# Patient Record
Sex: MALE | Race: WHITE | Employment: OTHER | URBAN - METROPOLITAN AREA
[De-identification: names, ages, dates, MRNs, and addresses within clinical notes are randomized per-mention and may not be internally consistent; named-entity substitution may affect disease eponyms.]

---

## 2017-02-07 ENCOUNTER — OFFICE VISIT (OUTPATIENT)
Dept: INTERNAL MEDICINE CLINIC | Age: 67
End: 2017-02-07

## 2017-02-07 VITALS
HEART RATE: 66 BPM | RESPIRATION RATE: 12 BRPM | SYSTOLIC BLOOD PRESSURE: 130 MMHG | HEIGHT: 65 IN | DIASTOLIC BLOOD PRESSURE: 70 MMHG | WEIGHT: 233.2 LBS | BODY MASS INDEX: 38.85 KG/M2 | TEMPERATURE: 97.9 F | OXYGEN SATURATION: 97 %

## 2017-02-07 DIAGNOSIS — E78.5 HYPERLIPIDEMIA, UNSPECIFIED HYPERLIPIDEMIA TYPE: ICD-10-CM

## 2017-02-07 DIAGNOSIS — E11.8 TYPE 2 DIABETES MELLITUS WITH COMPLICATION, WITHOUT LONG-TERM CURRENT USE OF INSULIN (HCC): Primary | ICD-10-CM

## 2017-02-07 DIAGNOSIS — E55.9 VITAMIN D DEFICIENCY: ICD-10-CM

## 2017-02-07 DIAGNOSIS — K21.9 GASTROESOPHAGEAL REFLUX DISEASE, ESOPHAGITIS PRESENCE NOT SPECIFIED: ICD-10-CM

## 2017-02-07 DIAGNOSIS — I10 ESSENTIAL HYPERTENSION: ICD-10-CM

## 2017-02-07 DIAGNOSIS — R80.9 MICROALBUMINURIA: ICD-10-CM

## 2017-02-07 LAB
A/G RATIO: 1.2 (ref 1.1–2.2)
ALBUMIN SERPL-MCNC: 4.2 G/DL (ref 3.4–5)
ALP BLD-CCNC: 63 U/L (ref 40–129)
ALT SERPL-CCNC: 31 U/L (ref 10–40)
ANION GAP SERPL CALCULATED.3IONS-SCNC: 17 MMOL/L (ref 3–16)
AST SERPL-CCNC: 21 U/L (ref 15–37)
BILIRUB SERPL-MCNC: 0.3 MG/DL (ref 0–1)
BUN BLDV-MCNC: 12 MG/DL (ref 7–20)
CALCIUM SERPL-MCNC: 9.4 MG/DL (ref 8.3–10.6)
CHLORIDE BLD-SCNC: 99 MMOL/L (ref 99–110)
CHOLESTEROL, TOTAL: 164 MG/DL (ref 0–199)
CO2: 25 MMOL/L (ref 21–32)
CREAT SERPL-MCNC: 0.7 MG/DL (ref 0.8–1.3)
CREATININE URINE: 72.6 MG/DL (ref 39–259)
GFR AFRICAN AMERICAN: >60
GFR NON-AFRICAN AMERICAN: >60
GLOBULIN: 3.5 G/DL
GLUCOSE BLD-MCNC: 104 MG/DL (ref 70–99)
HDLC SERPL-MCNC: 42 MG/DL (ref 40–60)
LDL CHOLESTEROL CALCULATED: 91 MG/DL
MICROALBUMIN UR-MCNC: 4.7 MG/DL
MICROALBUMIN/CREAT UR-RTO: 64.7 MG/G (ref 0–30)
POTASSIUM SERPL-SCNC: 4.5 MMOL/L (ref 3.5–5.1)
SODIUM BLD-SCNC: 141 MMOL/L (ref 136–145)
TOTAL PROTEIN: 7.7 G/DL (ref 6.4–8.2)
TRIGL SERPL-MCNC: 156 MG/DL (ref 0–150)
VITAMIN D 25-HYDROXY: 46.8 NG/ML
VLDLC SERPL CALC-MCNC: 31 MG/DL

## 2017-02-07 PROCEDURE — 99214 OFFICE O/P EST MOD 30 MIN: CPT | Performed by: INTERNAL MEDICINE

## 2017-02-07 ASSESSMENT — ENCOUNTER SYMPTOMS
CONSTIPATION: 0
COUGH: 0
DIARRHEA: 0
RHINORRHEA: 0
SORE THROAT: 0
SHORTNESS OF BREATH: 0
VOMITING: 0
BLOOD IN STOOL: 0
NAUSEA: 0
ABDOMINAL PAIN: 0
TROUBLE SWALLOWING: 0
WHEEZING: 0
CHEST TIGHTNESS: 0

## 2017-02-08 LAB
ESTIMATED AVERAGE GLUCOSE: 148.5 MG/DL
HBA1C MFR BLD: 6.8 %

## 2017-04-03 DIAGNOSIS — E78.00 PURE HYPERCHOLESTEROLEMIA: ICD-10-CM

## 2017-04-03 DIAGNOSIS — I10 ESSENTIAL HYPERTENSION: ICD-10-CM

## 2017-04-03 RX ORDER — LOVASTATIN 40 MG/1
40 TABLET ORAL NIGHTLY
Qty: 90 TABLET | Refills: 2 | Status: SHIPPED | OUTPATIENT
Start: 2017-04-03 | End: 2018-02-22 | Stop reason: SDUPTHER

## 2017-04-03 RX ORDER — LISINOPRIL 20 MG/1
20 TABLET ORAL DAILY
Qty: 90 TABLET | Refills: 2 | Status: SHIPPED | OUTPATIENT
Start: 2017-04-03 | End: 2018-02-22 | Stop reason: SDUPTHER

## 2017-04-04 LAB
BASOPHILS ABSOLUTE: 0 K/UL (ref 0–0.2)
BASOPHILS RELATIVE PERCENT: 0.5 %
EOSINOPHILS ABSOLUTE: 0 K/UL (ref 0–0.6)
EOSINOPHILS RELATIVE PERCENT: 0.7 %
FERRITIN: 92.4 NG/ML (ref 30–400)
HCT VFR BLD CALC: 39.3 % (ref 40.5–52.5)
HEMOGLOBIN: 12.7 G/DL (ref 13.5–17.5)
IRON SATURATION: 27 % (ref 20–50)
IRON: 93 UG/DL (ref 59–158)
LYMPHOCYTES ABSOLUTE: 2.7 K/UL (ref 1–5.1)
LYMPHOCYTES RELATIVE PERCENT: 39.5 %
MAGNESIUM: 1.7 MG/DL (ref 1.8–2.4)
MCH RBC QN AUTO: 27.8 PG (ref 26–34)
MCHC RBC AUTO-ENTMCNC: 32.3 G/DL (ref 31–36)
MCV RBC AUTO: 86.1 FL (ref 80–100)
MONOCYTES ABSOLUTE: 0.5 K/UL (ref 0–1.3)
MONOCYTES RELATIVE PERCENT: 6.9 %
NEUTROPHILS ABSOLUTE: 3.6 K/UL (ref 1.7–7.7)
NEUTROPHILS RELATIVE PERCENT: 52.4 %
PDW BLD-RTO: 13.7 % (ref 12.4–15.4)
PLATELET # BLD: 266 K/UL (ref 135–450)
PMV BLD AUTO: 9.1 FL (ref 5–10.5)
RBC # BLD: 4.57 M/UL (ref 4.2–5.9)
TOTAL IRON BINDING CAPACITY: 342 UG/DL (ref 260–445)
VITAMIN B-12: 1133 PG/ML (ref 211–911)
WBC # BLD: 6.9 K/UL (ref 4–11)

## 2017-04-20 LAB — FOLATE: >20 NG/ML (ref 4.78–24.2)

## 2017-05-09 ENCOUNTER — OFFICE VISIT (OUTPATIENT)
Dept: INTERNAL MEDICINE CLINIC | Age: 67
End: 2017-05-09

## 2017-05-09 VITALS
DIASTOLIC BLOOD PRESSURE: 70 MMHG | TEMPERATURE: 97.6 F | OXYGEN SATURATION: 98 % | BODY MASS INDEX: 37.09 KG/M2 | HEIGHT: 65 IN | SYSTOLIC BLOOD PRESSURE: 110 MMHG | HEART RATE: 64 BPM | RESPIRATION RATE: 14 BRPM | WEIGHT: 222.6 LBS

## 2017-05-09 DIAGNOSIS — E55.9 VITAMIN D DEFICIENCY: ICD-10-CM

## 2017-05-09 DIAGNOSIS — Z12.11 COLON CANCER SCREENING: ICD-10-CM

## 2017-05-09 DIAGNOSIS — E11.8 TYPE 2 DIABETES MELLITUS WITH COMPLICATION, WITHOUT LONG-TERM CURRENT USE OF INSULIN (HCC): Primary | ICD-10-CM

## 2017-05-09 DIAGNOSIS — R80.9 MICROALBUMINURIA: ICD-10-CM

## 2017-05-09 DIAGNOSIS — K21.9 GASTROESOPHAGEAL REFLUX DISEASE, ESOPHAGITIS PRESENCE NOT SPECIFIED: ICD-10-CM

## 2017-05-09 DIAGNOSIS — I10 ESSENTIAL HYPERTENSION: ICD-10-CM

## 2017-05-09 DIAGNOSIS — E78.5 HYPERLIPIDEMIA, UNSPECIFIED HYPERLIPIDEMIA TYPE: ICD-10-CM

## 2017-05-09 PROCEDURE — 99214 OFFICE O/P EST MOD 30 MIN: CPT | Performed by: INTERNAL MEDICINE

## 2017-05-09 ASSESSMENT — ENCOUNTER SYMPTOMS
VOMITING: 0
CHEST TIGHTNESS: 0
WHEEZING: 0
CONSTIPATION: 0
SHORTNESS OF BREATH: 0
SORE THROAT: 0
NAUSEA: 0
COUGH: 0
ABDOMINAL PAIN: 0
DIARRHEA: 0
RHINORRHEA: 0

## 2017-05-15 ENCOUNTER — NURSE ONLY (OUTPATIENT)
Dept: INTERNAL MEDICINE CLINIC | Age: 67
End: 2017-05-15

## 2017-05-15 DIAGNOSIS — Z12.11 COLON CANCER SCREENING: ICD-10-CM

## 2017-05-15 DIAGNOSIS — Z12.11 COLON CANCER SCREENING: Primary | ICD-10-CM

## 2017-05-15 LAB
CONTROL: NEGATIVE
HEMOCCULT STL QL: NEGATIVE

## 2017-05-15 PROCEDURE — 82274 ASSAY TEST FOR BLOOD FECAL: CPT | Performed by: INTERNAL MEDICINE

## 2017-09-07 ENCOUNTER — OFFICE VISIT (OUTPATIENT)
Dept: INTERNAL MEDICINE CLINIC | Age: 67
End: 2017-09-07

## 2017-09-07 VITALS
BODY MASS INDEX: 36.92 KG/M2 | HEIGHT: 65 IN | DIASTOLIC BLOOD PRESSURE: 64 MMHG | RESPIRATION RATE: 12 BRPM | SYSTOLIC BLOOD PRESSURE: 126 MMHG | TEMPERATURE: 97.6 F | OXYGEN SATURATION: 95 % | WEIGHT: 221.6 LBS | HEART RATE: 63 BPM

## 2017-09-07 DIAGNOSIS — Z00.00 ANNUAL PHYSICAL EXAM: Primary | ICD-10-CM

## 2017-09-07 DIAGNOSIS — D64.9 ANEMIA, UNSPECIFIED TYPE: ICD-10-CM

## 2017-09-07 DIAGNOSIS — E78.5 HYPERLIPIDEMIA, UNSPECIFIED HYPERLIPIDEMIA TYPE: ICD-10-CM

## 2017-09-07 DIAGNOSIS — E11.8 TYPE 2 DIABETES MELLITUS WITH COMPLICATION, WITHOUT LONG-TERM CURRENT USE OF INSULIN (HCC): ICD-10-CM

## 2017-09-07 DIAGNOSIS — Z12.5 SCREENING PSA (PROSTATE SPECIFIC ANTIGEN): ICD-10-CM

## 2017-09-07 DIAGNOSIS — Z23 NEED FOR INFLUENZA VACCINATION: ICD-10-CM

## 2017-09-07 DIAGNOSIS — E55.9 VITAMIN D DEFICIENCY: ICD-10-CM

## 2017-09-07 DIAGNOSIS — Z12.11 COLON CANCER SCREENING: ICD-10-CM

## 2017-09-07 DIAGNOSIS — K21.9 GASTROESOPHAGEAL REFLUX DISEASE, ESOPHAGITIS PRESENCE NOT SPECIFIED: ICD-10-CM

## 2017-09-07 DIAGNOSIS — R80.9 MICROALBUMINURIA: ICD-10-CM

## 2017-09-07 DIAGNOSIS — I10 ESSENTIAL HYPERTENSION: ICD-10-CM

## 2017-09-07 DIAGNOSIS — R31.29 MICROSCOPIC HEMATURIA: ICD-10-CM

## 2017-09-07 LAB
A/G RATIO: 1.4 (ref 1.1–2.2)
ALBUMIN SERPL-MCNC: 4.5 G/DL (ref 3.4–5)
ALP BLD-CCNC: 63 U/L (ref 40–129)
ALT SERPL-CCNC: 30 U/L (ref 10–40)
ANION GAP SERPL CALCULATED.3IONS-SCNC: 17 MMOL/L (ref 3–16)
AST SERPL-CCNC: 24 U/L (ref 15–37)
BASOPHILS ABSOLUTE: 0.1 K/UL (ref 0–0.2)
BASOPHILS RELATIVE PERCENT: 0.6 %
BILIRUB SERPL-MCNC: 0.4 MG/DL (ref 0–1)
BILIRUBIN, POC: ABNORMAL
BLOOD URINE, POC: ABNORMAL
BUN BLDV-MCNC: 13 MG/DL (ref 7–20)
CALCIUM SERPL-MCNC: 10 MG/DL (ref 8.3–10.6)
CHLORIDE BLD-SCNC: 98 MMOL/L (ref 99–110)
CHOLESTEROL, TOTAL: 165 MG/DL (ref 0–199)
CLARITY, POC: CLEAR
CO2: 23 MMOL/L (ref 21–32)
COLOR, POC: YELLOW
CREAT SERPL-MCNC: 0.6 MG/DL (ref 0.8–1.3)
EOSINOPHILS ABSOLUTE: 0.1 K/UL (ref 0–0.6)
EOSINOPHILS RELATIVE PERCENT: 0.9 %
GFR AFRICAN AMERICAN: >60
GFR NON-AFRICAN AMERICAN: >60
GLOBULIN: 3.3 G/DL
GLUCOSE BLD-MCNC: 108 MG/DL (ref 70–99)
GLUCOSE URINE, POC: ABNORMAL
HCT VFR BLD CALC: 39 % (ref 40.5–52.5)
HDLC SERPL-MCNC: 42 MG/DL (ref 40–60)
HEMOCCULT STL QL: NEGATIVE
HEMOCCULT STL QL: NORMAL
HEMOCCULT STL QL: NORMAL
HEMOGLOBIN: 12.8 G/DL (ref 13.5–17.5)
KETONES, POC: ABNORMAL
LDL CHOLESTEROL CALCULATED: 91 MG/DL
LEUKOCYTE EST, POC: ABNORMAL
LYMPHOCYTES ABSOLUTE: 2.7 K/UL (ref 1–5.1)
LYMPHOCYTES RELATIVE PERCENT: 31.7 %
MCH RBC QN AUTO: 27.7 PG (ref 26–34)
MCHC RBC AUTO-ENTMCNC: 32.8 G/DL (ref 31–36)
MCV RBC AUTO: 84.6 FL (ref 80–100)
MONOCYTES ABSOLUTE: 0.6 K/UL (ref 0–1.3)
MONOCYTES RELATIVE PERCENT: 6.8 %
NEUTROPHILS ABSOLUTE: 5.1 K/UL (ref 1.7–7.7)
NEUTROPHILS RELATIVE PERCENT: 60 %
NITRITE, POC: ABNORMAL
PDW BLD-RTO: 14.3 % (ref 12.4–15.4)
PH, POC: 5.5
PLATELET # BLD: 275 K/UL (ref 135–450)
PMV BLD AUTO: 8.8 FL (ref 5–10.5)
POTASSIUM SERPL-SCNC: 4.6 MMOL/L (ref 3.5–5.1)
PROSTATE SPECIFIC ANTIGEN: 1.43 NG/ML (ref 0–4)
PROTEIN, POC: 30
RBC # BLD: 4.61 M/UL (ref 4.2–5.9)
SODIUM BLD-SCNC: 138 MMOL/L (ref 136–145)
SPECIFIC GRAVITY, POC: 1.02
TOTAL PROTEIN: 7.8 G/DL (ref 6.4–8.2)
TRIGL SERPL-MCNC: 162 MG/DL (ref 0–150)
UROBILINOGEN, POC: 0.2
VITAMIN D 25-HYDROXY: 48.2 NG/ML
VLDLC SERPL CALC-MCNC: 32 MG/DL
WBC # BLD: 8.5 K/UL (ref 4–11)

## 2017-09-07 PROCEDURE — 90662 IIV NO PRSV INCREASED AG IM: CPT | Performed by: INTERNAL MEDICINE

## 2017-09-07 PROCEDURE — 81002 URINALYSIS NONAUTO W/O SCOPE: CPT | Performed by: INTERNAL MEDICINE

## 2017-09-07 PROCEDURE — 82270 OCCULT BLOOD FECES: CPT | Performed by: INTERNAL MEDICINE

## 2017-09-07 PROCEDURE — 99397 PER PM REEVAL EST PAT 65+ YR: CPT | Performed by: INTERNAL MEDICINE

## 2017-09-07 PROCEDURE — G0008 ADMIN INFLUENZA VIRUS VAC: HCPCS | Performed by: INTERNAL MEDICINE

## 2017-09-07 RX ORDER — MAGNESIUM OXIDE 400 MG/1
400 TABLET ORAL DAILY
COMMUNITY

## 2017-09-07 ASSESSMENT — ENCOUNTER SYMPTOMS
VOMITING: 0
EYE DISCHARGE: 0
TROUBLE SWALLOWING: 0
APNEA: 0
RHINORRHEA: 0
ANAL BLEEDING: 0
ABDOMINAL DISTENTION: 0
NAUSEA: 0
FACIAL SWELLING: 0
BACK PAIN: 0
EYE PAIN: 0
EYE ITCHING: 0
WHEEZING: 0
PHOTOPHOBIA: 0
CHEST TIGHTNESS: 0
RECTAL PAIN: 0
SORE THROAT: 0
CHOKING: 0
VOICE CHANGE: 0
BLOOD IN STOOL: 0
SHORTNESS OF BREATH: 0
SINUS PRESSURE: 0
DIARRHEA: 0
ABDOMINAL PAIN: 0
EYE REDNESS: 0
COUGH: 0
CONSTIPATION: 0

## 2017-09-08 LAB
ESTIMATED AVERAGE GLUCOSE: 148.5 MG/DL
HBA1C MFR BLD: 6.8 %

## 2017-11-27 LAB — DIABETIC RETINOPATHY: NORMAL

## 2018-02-22 ENCOUNTER — OFFICE VISIT (OUTPATIENT)
Dept: INTERNAL MEDICINE CLINIC | Age: 68
End: 2018-02-22

## 2018-02-22 VITALS
HEIGHT: 65 IN | HEART RATE: 68 BPM | BODY MASS INDEX: 38.72 KG/M2 | SYSTOLIC BLOOD PRESSURE: 124 MMHG | OXYGEN SATURATION: 97 % | DIASTOLIC BLOOD PRESSURE: 72 MMHG | WEIGHT: 232.4 LBS

## 2018-02-22 DIAGNOSIS — E11.9 TYPE 2 DIABETES MELLITUS WITHOUT COMPLICATION, WITHOUT LONG-TERM CURRENT USE OF INSULIN (HCC): Primary | ICD-10-CM

## 2018-02-22 DIAGNOSIS — E11.9 TYPE 2 DIABETES MELLITUS WITHOUT COMPLICATION, WITHOUT LONG-TERM CURRENT USE OF INSULIN (HCC): ICD-10-CM

## 2018-02-22 DIAGNOSIS — E55.9 VITAMIN D DEFICIENCY: ICD-10-CM

## 2018-02-22 DIAGNOSIS — E78.2 MIXED HYPERLIPIDEMIA: ICD-10-CM

## 2018-02-22 DIAGNOSIS — I10 ESSENTIAL HYPERTENSION: ICD-10-CM

## 2018-02-22 DIAGNOSIS — K21.9 GASTROESOPHAGEAL REFLUX DISEASE, ESOPHAGITIS PRESENCE NOT SPECIFIED: ICD-10-CM

## 2018-02-22 DIAGNOSIS — M17.0 PRIMARY OSTEOARTHRITIS OF BOTH KNEES: ICD-10-CM

## 2018-02-22 LAB
A/G RATIO: 1.7 (ref 1.1–2.2)
ALBUMIN SERPL-MCNC: 4.7 G/DL (ref 3.4–5)
ALP BLD-CCNC: 70 U/L (ref 40–129)
ALT SERPL-CCNC: 35 U/L (ref 10–40)
ANION GAP SERPL CALCULATED.3IONS-SCNC: 13 MMOL/L (ref 3–16)
AST SERPL-CCNC: 25 U/L (ref 15–37)
BILIRUB SERPL-MCNC: 0.3 MG/DL (ref 0–1)
BUN BLDV-MCNC: 11 MG/DL (ref 7–20)
CALCIUM SERPL-MCNC: 9.4 MG/DL (ref 8.3–10.6)
CHLORIDE BLD-SCNC: 98 MMOL/L (ref 99–110)
CHOLESTEROL, TOTAL: 205 MG/DL (ref 0–199)
CO2: 26 MMOL/L (ref 21–32)
CREAT SERPL-MCNC: 0.6 MG/DL (ref 0.8–1.3)
CREATININE URINE: 50.3 MG/DL (ref 39–259)
GFR AFRICAN AMERICAN: >60
GFR NON-AFRICAN AMERICAN: >60
GLOBULIN: 2.8 G/DL
GLUCOSE BLD-MCNC: 115 MG/DL (ref 70–99)
HBA1C MFR BLD: 7 %
HDLC SERPL-MCNC: 38 MG/DL (ref 40–60)
LDL CHOLESTEROL CALCULATED: 125 MG/DL
MICROALBUMIN UR-MCNC: 4.5 MG/DL
MICROALBUMIN/CREAT UR-RTO: 89.5 MG/G (ref 0–30)
POTASSIUM SERPL-SCNC: 4.7 MMOL/L (ref 3.5–5.1)
SODIUM BLD-SCNC: 137 MMOL/L (ref 136–145)
TOTAL PROTEIN: 7.5 G/DL (ref 6.4–8.2)
TRIGL SERPL-MCNC: 212 MG/DL (ref 0–150)
VLDLC SERPL CALC-MCNC: 42 MG/DL

## 2018-02-22 PROCEDURE — 83036 HEMOGLOBIN GLYCOSYLATED A1C: CPT | Performed by: INTERNAL MEDICINE

## 2018-02-22 PROCEDURE — 1036F TOBACCO NON-USER: CPT | Performed by: INTERNAL MEDICINE

## 2018-02-22 PROCEDURE — G8484 FLU IMMUNIZE NO ADMIN: HCPCS | Performed by: INTERNAL MEDICINE

## 2018-02-22 PROCEDURE — G8417 CALC BMI ABV UP PARAM F/U: HCPCS | Performed by: INTERNAL MEDICINE

## 2018-02-22 PROCEDURE — G8427 DOCREV CUR MEDS BY ELIG CLIN: HCPCS | Performed by: INTERNAL MEDICINE

## 2018-02-22 PROCEDURE — 1123F ACP DISCUSS/DSCN MKR DOCD: CPT | Performed by: INTERNAL MEDICINE

## 2018-02-22 PROCEDURE — 4040F PNEUMOC VAC/ADMIN/RCVD: CPT | Performed by: INTERNAL MEDICINE

## 2018-02-22 PROCEDURE — 3017F COLORECTAL CA SCREEN DOC REV: CPT | Performed by: INTERNAL MEDICINE

## 2018-02-22 PROCEDURE — 3045F PR MOST RECENT HEMOGLOBIN A1C LEVEL 7.0-9.0%: CPT | Performed by: INTERNAL MEDICINE

## 2018-02-22 PROCEDURE — 99214 OFFICE O/P EST MOD 30 MIN: CPT | Performed by: INTERNAL MEDICINE

## 2018-02-22 RX ORDER — LISINOPRIL 20 MG/1
20 TABLET ORAL DAILY
Qty: 90 TABLET | Refills: 2 | Status: SHIPPED | OUTPATIENT
Start: 2018-02-22 | End: 2018-11-12 | Stop reason: SDUPTHER

## 2018-02-22 RX ORDER — LOVASTATIN 40 MG/1
40 TABLET ORAL NIGHTLY
Qty: 90 TABLET | Refills: 2 | Status: SHIPPED | OUTPATIENT
Start: 2018-02-22 | End: 2018-11-12 | Stop reason: SDUPTHER

## 2018-02-22 ASSESSMENT — PATIENT HEALTH QUESTIONNAIRE - PHQ9
1. LITTLE INTEREST OR PLEASURE IN DOING THINGS: 0
SUM OF ALL RESPONSES TO PHQ9 QUESTIONS 1 & 2: 0
2. FEELING DOWN, DEPRESSED OR HOPELESS: 0
SUM OF ALL RESPONSES TO PHQ QUESTIONS 1-9: 0

## 2018-02-23 ENCOUNTER — TELEPHONE (OUTPATIENT)
Dept: INTERNAL MEDICINE CLINIC | Age: 68
End: 2018-02-23

## 2018-02-23 DIAGNOSIS — K22.70 BARRETT'S ESOPHAGUS WITHOUT DYSPLASIA: Primary | ICD-10-CM

## 2018-02-23 DIAGNOSIS — K21.9 GASTROESOPHAGEAL REFLUX DISEASE, ESOPHAGITIS PRESENCE NOT SPECIFIED: ICD-10-CM

## 2018-02-23 LAB — VITAMIN D 25-HYDROXY: 46.6 NG/ML

## 2018-02-27 ASSESSMENT — ENCOUNTER SYMPTOMS
NAUSEA: 0
CONSTIPATION: 0
ABDOMINAL PAIN: 0
TROUBLE SWALLOWING: 0
SHORTNESS OF BREATH: 0
RHINORRHEA: 0
WHEEZING: 0
SORE THROAT: 0
DIARRHEA: 0
BLOOD IN STOOL: 0
VOMITING: 0
COUGH: 0
CHEST TIGHTNESS: 0

## 2018-03-19 DIAGNOSIS — E11.9 TYPE 2 DIABETES MELLITUS WITHOUT COMPLICATION, WITHOUT LONG-TERM CURRENT USE OF INSULIN (HCC): ICD-10-CM

## 2018-06-22 ENCOUNTER — OFFICE VISIT (OUTPATIENT)
Dept: INTERNAL MEDICINE CLINIC | Age: 68
End: 2018-06-22

## 2018-06-22 VITALS
DIASTOLIC BLOOD PRESSURE: 60 MMHG | OXYGEN SATURATION: 95 % | SYSTOLIC BLOOD PRESSURE: 122 MMHG | HEART RATE: 70 BPM | WEIGHT: 233 LBS | HEIGHT: 65 IN | BODY MASS INDEX: 38.82 KG/M2

## 2018-06-22 DIAGNOSIS — I10 ESSENTIAL HYPERTENSION: ICD-10-CM

## 2018-06-22 DIAGNOSIS — E11.8 TYPE 2 DIABETES MELLITUS WITH COMPLICATION, WITHOUT LONG-TERM CURRENT USE OF INSULIN (HCC): ICD-10-CM

## 2018-06-22 DIAGNOSIS — E78.2 MIXED HYPERLIPIDEMIA: ICD-10-CM

## 2018-06-22 DIAGNOSIS — K21.9 GASTROESOPHAGEAL REFLUX DISEASE, ESOPHAGITIS PRESENCE NOT SPECIFIED: ICD-10-CM

## 2018-06-22 DIAGNOSIS — E11.8 TYPE 2 DIABETES MELLITUS WITH COMPLICATION, WITHOUT LONG-TERM CURRENT USE OF INSULIN (HCC): Primary | ICD-10-CM

## 2018-06-22 DIAGNOSIS — E55.9 VITAMIN D DEFICIENCY: ICD-10-CM

## 2018-06-22 LAB
A/G RATIO: 1.6 (ref 1.1–2.2)
ALBUMIN SERPL-MCNC: 4.6 G/DL (ref 3.4–5)
ALP BLD-CCNC: 61 U/L (ref 40–129)
ALT SERPL-CCNC: 35 U/L (ref 10–40)
ANION GAP SERPL CALCULATED.3IONS-SCNC: 15 MMOL/L (ref 3–16)
AST SERPL-CCNC: 28 U/L (ref 15–37)
BILIRUB SERPL-MCNC: <0.2 MG/DL (ref 0–1)
BUN BLDV-MCNC: 11 MG/DL (ref 7–20)
CALCIUM SERPL-MCNC: 10.1 MG/DL (ref 8.3–10.6)
CHLORIDE BLD-SCNC: 97 MMOL/L (ref 99–110)
CHOLESTEROL, TOTAL: 146 MG/DL (ref 0–199)
CO2: 26 MMOL/L (ref 21–32)
CREAT SERPL-MCNC: 0.6 MG/DL (ref 0.8–1.3)
GFR AFRICAN AMERICAN: >60
GFR NON-AFRICAN AMERICAN: >60
GLOBULIN: 2.8 G/DL
GLUCOSE BLD-MCNC: 113 MG/DL (ref 70–99)
HBA1C MFR BLD: 6.9 %
HDLC SERPL-MCNC: 40 MG/DL (ref 40–60)
LDL CHOLESTEROL CALCULATED: 74 MG/DL
POTASSIUM SERPL-SCNC: 4.9 MMOL/L (ref 3.5–5.1)
SODIUM BLD-SCNC: 138 MMOL/L (ref 136–145)
TOTAL PROTEIN: 7.4 G/DL (ref 6.4–8.2)
TRIGL SERPL-MCNC: 160 MG/DL (ref 0–150)
VLDLC SERPL CALC-MCNC: 32 MG/DL

## 2018-06-22 PROCEDURE — 2022F DILAT RTA XM EVC RTNOPTHY: CPT | Performed by: INTERNAL MEDICINE

## 2018-06-22 PROCEDURE — G8417 CALC BMI ABV UP PARAM F/U: HCPCS | Performed by: INTERNAL MEDICINE

## 2018-06-22 PROCEDURE — 1036F TOBACCO NON-USER: CPT | Performed by: INTERNAL MEDICINE

## 2018-06-22 PROCEDURE — 1123F ACP DISCUSS/DSCN MKR DOCD: CPT | Performed by: INTERNAL MEDICINE

## 2018-06-22 PROCEDURE — 3017F COLORECTAL CA SCREEN DOC REV: CPT | Performed by: INTERNAL MEDICINE

## 2018-06-22 PROCEDURE — 4040F PNEUMOC VAC/ADMIN/RCVD: CPT | Performed by: INTERNAL MEDICINE

## 2018-06-22 PROCEDURE — 83036 HEMOGLOBIN GLYCOSYLATED A1C: CPT | Performed by: INTERNAL MEDICINE

## 2018-06-22 PROCEDURE — 99214 OFFICE O/P EST MOD 30 MIN: CPT | Performed by: INTERNAL MEDICINE

## 2018-06-22 PROCEDURE — 3044F HG A1C LEVEL LT 7.0%: CPT | Performed by: INTERNAL MEDICINE

## 2018-06-22 PROCEDURE — G8427 DOCREV CUR MEDS BY ELIG CLIN: HCPCS | Performed by: INTERNAL MEDICINE

## 2018-06-23 ASSESSMENT — ENCOUNTER SYMPTOMS
RHINORRHEA: 0
NAUSEA: 0
SHORTNESS OF BREATH: 0
DIARRHEA: 0
CONSTIPATION: 0
CHEST TIGHTNESS: 0
WHEEZING: 0
SORE THROAT: 0
BLOOD IN STOOL: 0
ABDOMINAL PAIN: 0
TROUBLE SWALLOWING: 0
COUGH: 0
VOMITING: 0

## 2018-09-18 ENCOUNTER — OFFICE VISIT (OUTPATIENT)
Dept: INTERNAL MEDICINE CLINIC | Age: 68
End: 2018-09-18

## 2018-09-18 VITALS
DIASTOLIC BLOOD PRESSURE: 70 MMHG | BODY MASS INDEX: 37.92 KG/M2 | HEIGHT: 65 IN | SYSTOLIC BLOOD PRESSURE: 132 MMHG | HEART RATE: 70 BPM | RESPIRATION RATE: 14 BRPM | OXYGEN SATURATION: 94 % | TEMPERATURE: 97.9 F | WEIGHT: 227.6 LBS

## 2018-09-18 DIAGNOSIS — R31.29 MICROSCOPIC HEMATURIA: ICD-10-CM

## 2018-09-18 DIAGNOSIS — E11.8 TYPE 2 DIABETES MELLITUS WITH COMPLICATION, WITHOUT LONG-TERM CURRENT USE OF INSULIN (HCC): ICD-10-CM

## 2018-09-18 DIAGNOSIS — R82.998 LEUKOCYTES IN URINE: ICD-10-CM

## 2018-09-18 DIAGNOSIS — K21.9 GASTROESOPHAGEAL REFLUX DISEASE, ESOPHAGITIS PRESENCE NOT SPECIFIED: ICD-10-CM

## 2018-09-18 DIAGNOSIS — Z00.00 ANNUAL PHYSICAL EXAM: ICD-10-CM

## 2018-09-18 DIAGNOSIS — Z12.5 SCREENING PSA (PROSTATE SPECIFIC ANTIGEN): ICD-10-CM

## 2018-09-18 DIAGNOSIS — E78.2 MIXED HYPERLIPIDEMIA: ICD-10-CM

## 2018-09-18 DIAGNOSIS — E55.9 VITAMIN D DEFICIENCY: ICD-10-CM

## 2018-09-18 DIAGNOSIS — Z00.00 ANNUAL PHYSICAL EXAM: Primary | ICD-10-CM

## 2018-09-18 DIAGNOSIS — B35.1 ONYCHOMYCOSIS: ICD-10-CM

## 2018-09-18 DIAGNOSIS — Z13.6 SCREENING FOR ISCHEMIC HEART DISEASE: ICD-10-CM

## 2018-09-18 DIAGNOSIS — Z23 NEED FOR INFLUENZA VACCINATION: ICD-10-CM

## 2018-09-18 DIAGNOSIS — I10 ESSENTIAL HYPERTENSION: ICD-10-CM

## 2018-09-18 DIAGNOSIS — M17.0 PRIMARY OSTEOARTHRITIS OF BOTH KNEES: ICD-10-CM

## 2018-09-18 LAB
A/G RATIO: 1.6 (ref 1.1–2.2)
ALBUMIN SERPL-MCNC: 4.9 G/DL (ref 3.4–5)
ALP BLD-CCNC: 64 U/L (ref 40–129)
ALT SERPL-CCNC: 37 U/L (ref 10–40)
ANION GAP SERPL CALCULATED.3IONS-SCNC: 14 MMOL/L (ref 3–16)
AST SERPL-CCNC: 29 U/L (ref 15–37)
BASOPHILS ABSOLUTE: 0.1 K/UL (ref 0–0.2)
BASOPHILS RELATIVE PERCENT: 0.7 %
BILIRUB SERPL-MCNC: 0.3 MG/DL (ref 0–1)
BILIRUBIN, POC: ABNORMAL
BLOOD URINE, POC: ABNORMAL
BUN BLDV-MCNC: 12 MG/DL (ref 7–20)
CALCIUM SERPL-MCNC: 9.9 MG/DL (ref 8.3–10.6)
CHLORIDE BLD-SCNC: 101 MMOL/L (ref 99–110)
CHOLESTEROL, TOTAL: 142 MG/DL (ref 0–199)
CLARITY, POC: CLEAR
CO2: 26 MMOL/L (ref 21–32)
COLOR, POC: YELLOW
CREAT SERPL-MCNC: 0.6 MG/DL (ref 0.8–1.3)
EOSINOPHILS ABSOLUTE: 0.1 K/UL (ref 0–0.6)
EOSINOPHILS RELATIVE PERCENT: 1.1 %
GFR AFRICAN AMERICAN: >60
GFR NON-AFRICAN AMERICAN: >60
GLOBULIN: 3 G/DL
GLUCOSE BLD-MCNC: 122 MG/DL (ref 70–99)
GLUCOSE URINE, POC: ABNORMAL
HBA1C MFR BLD: 7.1 %
HCT VFR BLD CALC: 37 % (ref 40.5–52.5)
HDLC SERPL-MCNC: 38 MG/DL (ref 40–60)
HEMOGLOBIN: 12.2 G/DL (ref 13.5–17.5)
KETONES, POC: ABNORMAL
LDL CHOLESTEROL CALCULATED: 71 MG/DL
LEUKOCYTE EST, POC: ABNORMAL
LYMPHOCYTES ABSOLUTE: 2.5 K/UL (ref 1–5.1)
LYMPHOCYTES RELATIVE PERCENT: 29.8 %
MCH RBC QN AUTO: 28.2 PG (ref 26–34)
MCHC RBC AUTO-ENTMCNC: 33 G/DL (ref 31–36)
MCV RBC AUTO: 85.6 FL (ref 80–100)
MONOCYTES ABSOLUTE: 0.6 K/UL (ref 0–1.3)
MONOCYTES RELATIVE PERCENT: 6.5 %
NEUTROPHILS ABSOLUTE: 5.3 K/UL (ref 1.7–7.7)
NEUTROPHILS RELATIVE PERCENT: 61.9 %
NITRITE, POC: ABNORMAL
PDW BLD-RTO: 13.8 % (ref 12.4–15.4)
PH, POC: 5.5
PLATELET # BLD: 298 K/UL (ref 135–450)
PMV BLD AUTO: 8.8 FL (ref 5–10.5)
POTASSIUM SERPL-SCNC: 4.6 MMOL/L (ref 3.5–5.1)
PROSTATE SPECIFIC ANTIGEN: 1.12 NG/ML (ref 0–4)
PROTEIN, POC: ABNORMAL
RBC # BLD: 4.33 M/UL (ref 4.2–5.9)
SODIUM BLD-SCNC: 141 MMOL/L (ref 136–145)
SPECIFIC GRAVITY, POC: 1.01
TOTAL PROTEIN: 7.9 G/DL (ref 6.4–8.2)
TRIGL SERPL-MCNC: 166 MG/DL (ref 0–150)
TSH SERPL DL<=0.05 MIU/L-ACNC: 0.94 UIU/ML (ref 0.27–4.2)
UROBILINOGEN, POC: 0.2
VITAMIN D 25-HYDROXY: 53.5 NG/ML
VLDLC SERPL CALC-MCNC: 33 MG/DL
WBC # BLD: 8.5 K/UL (ref 4–11)

## 2018-09-18 PROCEDURE — 83036 HEMOGLOBIN GLYCOSYLATED A1C: CPT | Performed by: INTERNAL MEDICINE

## 2018-09-18 PROCEDURE — 99397 PER PM REEVAL EST PAT 65+ YR: CPT | Performed by: INTERNAL MEDICINE

## 2018-09-18 PROCEDURE — 93000 ELECTROCARDIOGRAM COMPLETE: CPT | Performed by: INTERNAL MEDICINE

## 2018-09-18 PROCEDURE — 90662 IIV NO PRSV INCREASED AG IM: CPT | Performed by: INTERNAL MEDICINE

## 2018-09-18 PROCEDURE — 81002 URINALYSIS NONAUTO W/O SCOPE: CPT | Performed by: INTERNAL MEDICINE

## 2018-09-18 PROCEDURE — G0008 ADMIN INFLUENZA VIRUS VAC: HCPCS | Performed by: INTERNAL MEDICINE

## 2018-09-18 ASSESSMENT — ENCOUNTER SYMPTOMS
EYE ITCHING: 0
APNEA: 0
CHOKING: 0
WHEEZING: 0
EYE PAIN: 0
SHORTNESS OF BREATH: 0
RECTAL PAIN: 0
PHOTOPHOBIA: 0
DIARRHEA: 0
ANAL BLEEDING: 0
EYE REDNESS: 0
SORE THROAT: 0
VOMITING: 0
FACIAL SWELLING: 0
CONSTIPATION: 0
RHINORRHEA: 0
SINUS PRESSURE: 0
NAUSEA: 0
EYE DISCHARGE: 0
BLOOD IN STOOL: 0
ABDOMINAL DISTENTION: 0
VOICE CHANGE: 0
COUGH: 0
ABDOMINAL PAIN: 0
TROUBLE SWALLOWING: 0
BACK PAIN: 0
CHEST TIGHTNESS: 0

## 2018-09-18 NOTE — PATIENT INSTRUCTIONS
in flu shots. They cannot cause the flu. There are many flu viruses, and they are always changing. Each year a new flu vaccine is made to protect against three or four viruses that are likely to cause disease in the upcoming flu season. But even when the vaccine doesn't exactly match these viruses, it may still provide some protection. Flu vaccine cannot prevent:  · Flu that is caused by a virus not covered by the vaccine. · Illnesses that look like flu but are not. Some people should not get this vaccine  Tell the person who is giving you the vaccine:  · If you have any severe (life-threatening) allergies. If you ever had a life-threatening allergic reaction after a dose of flu vaccine, or have a severe allergy to any part of this vaccine, you may be advised not to get vaccinated. Most, but not all, types of flu vaccine contain a small amount of egg protein. · If you ever had Guillain-Barré syndrome (also called GBS) Some people with a history of GBS should not get this vaccine. This should be discussed with your doctor. · If you are not feeling well. It is usually okay to get flu vaccine when you have a mild illness, but you might be asked to come back when you feel better. Risks of a vaccine reaction  With any medicine, including vaccines, there is a chance of reactions. These are usually mild and go away on their own, but serious reactions are also possible. Most people who get a flu shot do not have any problems with it. Minor problems following a flu shot include:  · Soreness, redness, or swelling where the shot was given  · Hoarseness  · Sore, red or itchy eyes  · Cough  · Fever  · Aches  · Headache  · Itching  · Fatigue  If these problems occur, they usually begin soon after the shot and last 1 or 2 days. More serious problems following a flu shot can include the following:  · There may be a small increased risk of Guillain-Barré Syndrome (GBS) after inactivated flu vaccine.  This risk has been estimated at 1 or 2 additional cases per million people vaccinated. This is much lower than the risk of severe complications from flu, which can be prevented by flu vaccine. · The [employer] children who get the flu shot along with pneumococcal vaccine (PCV13) and/or DTaP vaccine at the same time might be slightly more likely to have a seizure caused by fever. Ask your doctor for more information. Tell your doctor if a child who is getting flu vaccine has ever had a seizure  Problems that could happen after any injected vaccine:  · People sometimes faint after a medical procedure, including vaccination. Sitting or lying down for about 15 minutes can help prevent fainting, and injuries caused by a fall. Tell your doctor if you feel dizzy, or have vision changes or ringing in the ears. · Some people get severe pain in the shoulder and have difficulty moving the arm where a shot was given. This happens very rarely. · Any medication can cause a severe allergic reaction. Such reactions from a vaccine are very rare, estimated at about 1 in a million doses, and would happen within a few minutes to a few hours after the vaccination. As with any medicine, there is a very remote chance of a vaccine causing a serious injury or death. The safety of vaccines is always being monitored. For more information, visit: www.cdc.gov/vaccinesafety/. What if there is a serious reaction? What should I look for? · Look for anything that concerns you, such as signs of a severe allergic reaction, very high fever, or unusual behavior. Signs of a severe allergic reaction can include hives, swelling of the face and throat, difficulty breathing, a fast heartbeat, dizziness, and weakness - usually within a few minutes to a few hours after the vaccination. What should I do?   · If you think it is a severe allergic reaction or other emergency that can't wait, call 9-1-1 and get the person to the nearest hospital. Otherwise, call your doctor. · Reactions should be reported to the \"Vaccine Adverse Event Reporting System\" (VAERS). Your doctor should file this report, or you can do it yourself through the VAERS website at www.vaers. [facility].gov, or by calling 1-123.270.2496. VAERS does not give medical advice. The National Vaccine Injury Compensation Program  The National Vaccine Injury Compensation Program (VICP) is a federal program that was created to compensate people who may have been injured by certain vaccines. Persons who believe they may have been injured by a vaccine can learn about the program and about filing a claim by calling 6-650.866.5085 or visiting the [employer] website at www.[facility].gov/vaccinecompensation. There is a time limit to file a claim for compensation. How can I learn more? · Ask your healthcare provider. He or she can give you the vaccine package insert or suggest other sources of information. · Call your local or state health department. · Contact the Centers for Disease Control and Prevention (CDC):  ¨ Call 7-636.296.7298 (1-800-CDC-INFO) or  ¨ Visit CDC's website at www.cdc.gov/flu  Vaccine Information Statement  Inactivated Influenza Vaccine  8/7/2015)  [address] ANDREEAM[address]rina Cheryle Claudia [id number][id number]  Department of Health and Human Services  Centers for Disease Control and Prevention  Many Vaccine Information Statements are available in [place] citizen and other languages. See www.immunize.org/vis. Muchas hojas de información sobre vacunas están disponibles en español y en otros idiomas. Visite www.immunize.org/vis. Care instructions adapted under license by [facility] [place] ([facility]). If you have questions about a medical condition or this instruction, always ask your healthcare professional. [address] any warranty or liability for your use of this information.

## 2018-09-18 NOTE — PROGRESS NOTES
Brent Maradiaga   YOB: 1950    Date of Visit:  9/18/2018    Chief Complaint   Patient presents with    Annual Exam       HPI  Pt presents for annual physical exam.    Diabetes Mellitus Type 2:   Current Medications: Metformin 500mg po q day  Diet: Pt states he decreases carbohydrates  Home blood sugar records: fasting ranges 120-130 , patient tests 1 time(s) per day  Any episodes of hypoglycemia? no  Eye exam current (within one year): yes done on 11/27/17  Daily Aspirin? No     Hypertension:    Current Medications: Lisinopril 20mg po q day  Home blood pressure monitoring: Yes - and it averages 120-130/80  Patient is adherent to a low sodium diet. Antihypertensive medication side effects: no medication side effects noted.       Hyperlipidemia:    Current medications: Lovastatin 40mg nightly  No new myalgias or GI upset. Diet: Patient decreases fat and cholesterol. Current exercise: walks 5,000 steps 7 time(s) per week     Vitamin D deficiency:   Pt takes vitamin D 5,000 IU once daily.     GERD :   Pt doesn't have heartburn or reflux as long as he takes Omeprazole. Pt doesn't eat a lot of spicy foods or tomato based foods. Pt drinks 2-3 cups of coffee per day    Osteoarthritis both knees:  Pt has bilateral knee pain. Knee pain is achy most of the time. Pt states he has sharp knee pain if he overdoes it. Pt takes Glucosamine chondroitin and Tylenol    Review of Systems   Constitutional: Negative for activity change, appetite change, chills, diaphoresis, fatigue, fever and unexpected weight change. HENT: Negative for congestion, ear discharge, ear pain, facial swelling, hearing loss, mouth sores, nosebleeds, postnasal drip, rhinorrhea, sinus pressure, sneezing, sore throat, tinnitus, trouble swallowing and voice change. Eyes: Negative for photophobia, pain, discharge, redness, itching and visual disturbance.    Respiratory: Negative for apnea, cough, choking, chest tightness, shortness of breath and wheezing. Cardiovascular: Negative for chest pain, palpitations and leg swelling. Gastrointestinal: Negative for abdominal distention, abdominal pain, anal bleeding, blood in stool, constipation, diarrhea, nausea, rectal pain and vomiting. Endocrine: Negative for cold intolerance and heat intolerance. Genitourinary: Negative for decreased urine volume, difficulty urinating, discharge, dysuria, flank pain, frequency, genital sores, hematuria, penile pain, penile swelling, scrotal swelling, testicular pain and urgency. Musculoskeletal: Positive for arthralgias and gait problem (if knee pain is flared up). Negative for back pain, joint swelling, myalgias, neck pain and neck stiffness. Skin: Negative for rash. Neurological: Negative for dizziness, tremors, seizures, syncope, facial asymmetry, speech difficulty, weakness, light-headedness, numbness and headaches. Hematological: Negative for adenopathy. Does not bruise/bleed easily. Psychiatric/Behavioral: Negative for decreased concentration, dysphoric mood and sleep disturbance. The patient is not nervous/anxious.         Past Medical History:   Diagnosis Date    Anemia 5/21/2013    Paredes's esophagus     Colon polyps     Diabetes ([address] 2014    GERD (gastroesophageal reflux disease)     Hyperlipidemia 9/20/2014    Hypertension 5/3/2013    Iron deficiency anemia 10/8/2013    Microalbuminuria 11/18/2016    Osteoarthritis 9/20/2014    Primary osteoarthritis of both knees 4/12/2016    Right inguinal hernia 5/3/2013    Type 2 diabetes, diet controlled ([address] 8/16/2016    Vitamin D deficiency 12/19/2013       Past Surgical History:   Procedure Laterality Date    COLONOSCOPY  12/19/13    COLONOSCOPY  3/7/16    repeat in 5 years - Sadie DOWELL   [address] Right 2013    inguinal     OTHER SURGICAL HISTORY Left 08/03/2015    Left Buttock mass exesion      TONSILLECTOMY      UPPER GASTROINTESTINAL ENDOSCOPY  12/19/13    UPPER Neg Hx     Hypertension Neg Hx     Heart Disease Neg Hx        Immunization History   Administered Date(s) Administered    Influenza Virus Vaccine 10/08/2013, 10/08/2013, 10/18/2014    Influenza, High Dose (Fluzone 65 yrs and older) 12/01/2015, 11/14/2016, 09/07/2017, 09/18/2018    Pneumococcal 13-valent Conjugate ([id number]) 12/01/2015    Pneumococcal Polysaccharide ([id number]) 08/18/2014    Tdap (Boostrix, Adacel) 05/03/2013    Zoster Live (Zostavax) 05/09/2013       Vitals:    09/18/18 0846   BP: 132/70   Site: Right Upper Arm   Position: Sitting   Cuff Size: Large Adult   Pulse: 70   Resp: 14   Temp: 97.9 °F (36.6 °C)   TempSrc: Oral   SpO2: 94%   Weight: 227 lb 9.6 oz (103.2 kg)   Height: 5' 5\" (1.651 m)     Body mass index is 37.87 kg/m². Physical Exam   Constitutional: He is oriented to person, place, and time. He appears well-developed and well-nourished. No distress. HENT:   Head: Normocephalic and atraumatic. Right Ear: Hearing, tympanic membrane and ear canal normal.   Left Ear: Hearing, tympanic membrane and ear canal normal.   Nose: Nose normal.   Mouth/Throat: Uvula is midline, oropharynx is clear and moist and mucous membranes are normal.   Eyes: Pupils are equal, round, and reactive to light. Conjunctivae, EOM and lids are normal.   Neck: Neck supple. Carotid bruit is not present. No thyromegaly present. Cardiovascular: Normal rate, regular rhythm, S1 normal, S2 normal, normal heart sounds, intact distal pulses and normal pulses. Exam reveals no gallop and no friction rub. No murmur heard. Pulmonary/Chest: Effort normal and breath sounds normal. No respiratory distress. He has no wheezes. He has no rhonchi. He has no rales. Abdominal: Soft. Normal appearance and bowel sounds are normal. He exhibits no distension. There is no hepatosplenomegaly. There is no tenderness. There is no rebound and no guarding.  Hernia confirmed negative in the right inguinal area and confirmed Urobilinogen, UA 0.2     Leukocytes, UA Trace     Nitrite, UA N          Assessment/Plan     1. Annual physical exam  - POCT Urinalysis no Micro  - Comprehensive Metabolic Panel; Future  - CBC Auto Differential; Future  - TSH without Reflex; Future  - Lipid Panel; Future    2. Type 2 diabetes mellitus with complication, without long-term current use of insulin (HCC)    - POCT glycosylated hemoglobin (Hb A1C)  - metFORMIN (GLUCOPHAGE) 500 MG tablet; Take 1 tablet by mouth 2 times daily (with meals)  Dispense: 180 tablet; Refill: 2  - HM DIABETES FOOT EXAM    3. Essential hypertension  -stable  -Continue same medications  -Low sodium diet  -Regular aerobic exercise    4. Mixed hyperlipidemia  -Continue same medications  -Low fat, low cholesterol diet  -Regular aerobic exercise  - Lipid Panel; Future    5. Vitamin D deficiency  -Continue same medications  - Vitamin D 25 Hydroxy; Future    6. Gastroesophageal reflux disease, esophagitis presence not specified  -stable  -Continue same medications  -reflux precautions    7. Screening for ischemic heart disease  - EKG 12 Lead    8. Need for influenza vaccination  - INFLUENZA, HIGH DOSE, 65 YRS +, IM, PF, PREFILL SYR, 0.5ML (FLUZONE HD)    9. Screening PSA (prostate specific antigen)  - Psa screening; Future    10. Onychomycosis  - External Referral To Podiatry    11. Leukocytes in urine  - URINE CULTURE    12. Microscopic hematuria  - URINE CULTURE    13. Primary osteoarthritis of both knees  -stable  -Continue same medications      Discussed medications with patient, who voiced understanding of their use and indications. All questions answered. Return in about 4 months (around 1/18/2019) for diabetes, hypertension, hyperlipidemia, and GERD.

## 2018-09-20 DIAGNOSIS — E11.9 TYPE 2 DIABETES, DIET CONTROLLED (HCC): ICD-10-CM

## 2018-09-20 LAB
ORGANISM: ABNORMAL
URINE CULTURE, ROUTINE: ABNORMAL
URINE CULTURE, ROUTINE: ABNORMAL

## 2018-09-20 NOTE — TELEPHONE ENCOUNTER
Medication:   Requested Prescriptions     Pending Prescriptions Disp Refills    Blood Glucose Calibration (ACCU-CHEK SMARTVIEW CONTROL) LIQD 1 each 3     Sig: Use as directed    ACCU-CHEK FASTCLIX LANCETS MISC 204 each 3    blood glucose test strips (ACCU-CHEK SMARTVIEW) strip 200 strip 3     Sig: As needed.      Last Filled: 08.16.16  Last appt:9.18.18   Next appt: 1.15.19  Last Labs DM:   Lab Results   Component Value Date    LABA1C 7.1 09/18/2018

## 2018-09-21 RX ORDER — BLOOD-GLUCOSE CONTROL, NORMAL
EACH MISCELLANEOUS
Qty: 1 EACH | Refills: 3 | Status: SHIPPED | OUTPATIENT
Start: 2018-09-21

## 2018-09-21 RX ORDER — LANCETS
1 EACH MISCELLANEOUS DAILY
Qty: 100 EACH | Refills: 3 | Status: SHIPPED | OUTPATIENT
Start: 2018-09-21

## 2018-10-15 ENCOUNTER — CLINICAL DOCUMENTATION (OUTPATIENT)
Dept: FAMILY MEDICINE CLINIC | Age: 68
End: 2018-10-15

## 2018-11-12 DIAGNOSIS — I10 ESSENTIAL HYPERTENSION: ICD-10-CM

## 2018-11-12 DIAGNOSIS — E78.2 MIXED HYPERLIPIDEMIA: ICD-10-CM

## 2018-11-12 RX ORDER — LOVASTATIN 40 MG/1
TABLET ORAL
Qty: 90 TABLET | Refills: 1 | Status: SHIPPED | OUTPATIENT
Start: 2018-11-12 | End: 2019-05-06 | Stop reason: SDUPTHER

## 2018-11-12 RX ORDER — LISINOPRIL 20 MG/1
TABLET ORAL
Qty: 90 TABLET | Refills: 1 | Status: SHIPPED | OUTPATIENT
Start: 2018-11-12 | End: 2019-05-06 | Stop reason: SDUPTHER

## 2019-01-15 ENCOUNTER — OFFICE VISIT (OUTPATIENT)
Dept: INTERNAL MEDICINE CLINIC | Age: 69
End: 2019-01-15
Payer: MEDICARE

## 2019-01-15 VITALS
BODY MASS INDEX: 36.46 KG/M2 | DIASTOLIC BLOOD PRESSURE: 72 MMHG | HEIGHT: 65 IN | SYSTOLIC BLOOD PRESSURE: 130 MMHG | OXYGEN SATURATION: 96 % | HEART RATE: 77 BPM | TEMPERATURE: 97.6 F | WEIGHT: 218.8 LBS

## 2019-01-15 DIAGNOSIS — D50.9 IRON DEFICIENCY ANEMIA, UNSPECIFIED IRON DEFICIENCY ANEMIA TYPE: ICD-10-CM

## 2019-01-15 DIAGNOSIS — E11.8 TYPE 2 DIABETES MELLITUS WITH COMPLICATION, WITHOUT LONG-TERM CURRENT USE OF INSULIN (HCC): Primary | ICD-10-CM

## 2019-01-15 DIAGNOSIS — E78.2 MIXED HYPERLIPIDEMIA: ICD-10-CM

## 2019-01-15 DIAGNOSIS — R82.998 LEUKOCYTES IN URINE: ICD-10-CM

## 2019-01-15 DIAGNOSIS — I10 ESSENTIAL HYPERTENSION: ICD-10-CM

## 2019-01-15 DIAGNOSIS — H91.92 DECREASED HEARING OF LEFT EAR: ICD-10-CM

## 2019-01-15 DIAGNOSIS — H92.02 LEFT EAR PAIN: ICD-10-CM

## 2019-01-15 DIAGNOSIS — E55.9 VITAMIN D DEFICIENCY: ICD-10-CM

## 2019-01-15 DIAGNOSIS — K21.9 GASTROESOPHAGEAL REFLUX DISEASE, ESOPHAGITIS PRESENCE NOT SPECIFIED: ICD-10-CM

## 2019-01-15 DIAGNOSIS — E11.8 TYPE 2 DIABETES MELLITUS WITH COMPLICATION, WITHOUT LONG-TERM CURRENT USE OF INSULIN (HCC): ICD-10-CM

## 2019-01-15 LAB
A/G RATIO: 1.3 (ref 1.1–2.2)
ALBUMIN SERPL-MCNC: 4.5 G/DL (ref 3.4–5)
ALP BLD-CCNC: 58 U/L (ref 40–129)
ALT SERPL-CCNC: 34 U/L (ref 10–40)
ANION GAP SERPL CALCULATED.3IONS-SCNC: 16 MMOL/L (ref 3–16)
AST SERPL-CCNC: 26 U/L (ref 15–37)
BILIRUB SERPL-MCNC: <0.2 MG/DL (ref 0–1)
BILIRUBIN URINE: NEGATIVE
BLOOD, URINE: NEGATIVE
BUN BLDV-MCNC: 13 MG/DL (ref 7–20)
CALCIUM SERPL-MCNC: 10 MG/DL (ref 8.3–10.6)
CHLORIDE BLD-SCNC: 96 MMOL/L (ref 99–110)
CHOLESTEROL, TOTAL: 157 MG/DL (ref 0–199)
CLARITY: CLEAR
CO2: 24 MMOL/L (ref 21–32)
COLOR: YELLOW
CREAT SERPL-MCNC: 0.6 MG/DL (ref 0.8–1.3)
CREATININE URINE: 86 MG/DL (ref 39–259)
EPITHELIAL CELLS, UA: 2 /HPF (ref 0–5)
FERRITIN: 170.1 NG/ML (ref 30–400)
GFR AFRICAN AMERICAN: >60
GFR NON-AFRICAN AMERICAN: >60
GLOBULIN: 3.4 G/DL
GLUCOSE BLD-MCNC: 117 MG/DL (ref 70–99)
GLUCOSE URINE: NEGATIVE MG/DL
HBA1C MFR BLD: 6.8 %
HCT VFR BLD CALC: 39.7 % (ref 40.5–52.5)
HDLC SERPL-MCNC: 41 MG/DL (ref 40–60)
HEMOGLOBIN: 13 G/DL (ref 13.5–17.5)
HYALINE CASTS: 1 /LPF (ref 0–8)
IRON SATURATION: 20 % (ref 20–50)
IRON: 75 UG/DL (ref 59–158)
KETONES, URINE: NEGATIVE MG/DL
LDL CHOLESTEROL CALCULATED: 78 MG/DL
LEUKOCYTE ESTERASE, URINE: NEGATIVE
MCH RBC QN AUTO: 28 PG (ref 26–34)
MCHC RBC AUTO-ENTMCNC: 32.9 G/DL (ref 31–36)
MCV RBC AUTO: 85.1 FL (ref 80–100)
MICROALBUMIN UR-MCNC: 13.7 MG/DL
MICROALBUMIN/CREAT UR-RTO: 159.3 MG/G (ref 0–30)
MICROSCOPIC EXAMINATION: YES
NITRITE, URINE: NEGATIVE
PDW BLD-RTO: 13.7 % (ref 12.4–15.4)
PH UA: 6
PLATELET # BLD: 317 K/UL (ref 135–450)
PMV BLD AUTO: 9 FL (ref 5–10.5)
POTASSIUM SERPL-SCNC: 5 MMOL/L (ref 3.5–5.1)
PROTEIN UA: 30 MG/DL
RBC # BLD: 4.66 M/UL (ref 4.2–5.9)
RBC UA: 1 /HPF (ref 0–4)
SODIUM BLD-SCNC: 136 MMOL/L (ref 136–145)
SPECIFIC GRAVITY UA: 1.02
TOTAL IRON BINDING CAPACITY: 376 UG/DL (ref 260–445)
TOTAL PROTEIN: 7.9 G/DL (ref 6.4–8.2)
TRIGL SERPL-MCNC: 192 MG/DL (ref 0–150)
URINE TYPE: ABNORMAL
UROBILINOGEN, URINE: 0.2 E.U./DL
VLDLC SERPL CALC-MCNC: 38 MG/DL
WBC # BLD: 8.8 K/UL (ref 4–11)
WBC UA: 2 /HPF (ref 0–5)

## 2019-01-15 PROCEDURE — 83036 HEMOGLOBIN GLYCOSYLATED A1C: CPT | Performed by: INTERNAL MEDICINE

## 2019-01-15 PROCEDURE — 81003 URINALYSIS AUTO W/O SCOPE: CPT | Performed by: INTERNAL MEDICINE

## 2019-01-15 PROCEDURE — 99214 OFFICE O/P EST MOD 30 MIN: CPT | Performed by: INTERNAL MEDICINE

## 2019-01-15 ASSESSMENT — ENCOUNTER SYMPTOMS
RHINORRHEA: 0
CONSTIPATION: 0
SORE THROAT: 0
WHEEZING: 0
NAUSEA: 0
SHORTNESS OF BREATH: 0
VOMITING: 0
CHEST TIGHTNESS: 0
TROUBLE SWALLOWING: 0
ABDOMINAL PAIN: 0
BLOOD IN STOOL: 0
COUGH: 0
DIARRHEA: 0

## 2019-01-18 LAB — URINE CULTURE, ROUTINE: NORMAL

## 2019-04-03 ENCOUNTER — CLINICAL DOCUMENTATION (OUTPATIENT)
Dept: INTERNAL MEDICINE CLINIC | Age: 69
End: 2019-04-03

## 2019-05-06 DIAGNOSIS — E11.8 TYPE 2 DIABETES MELLITUS WITH COMPLICATION, WITHOUT LONG-TERM CURRENT USE OF INSULIN (HCC): ICD-10-CM

## 2019-05-06 DIAGNOSIS — I10 ESSENTIAL HYPERTENSION: ICD-10-CM

## 2019-05-06 DIAGNOSIS — E78.2 MIXED HYPERLIPIDEMIA: ICD-10-CM

## 2019-05-06 RX ORDER — LISINOPRIL 20 MG/1
TABLET ORAL
Qty: 90 TABLET | Refills: 0 | Status: SHIPPED | OUTPATIENT
Start: 2019-05-06 | End: 2019-08-18 | Stop reason: SDUPTHER

## 2019-05-06 RX ORDER — LOVASTATIN 40 MG/1
TABLET ORAL
Qty: 90 TABLET | Refills: 0 | Status: SHIPPED | OUTPATIENT
Start: 2019-05-06 | End: 2019-08-18 | Stop reason: SDUPTHER

## 2019-05-06 NOTE — TELEPHONE ENCOUNTER
Medication:   Requested Prescriptions     Pending Prescriptions Disp Refills    lisinopril (PRINIVIL;ZESTRIL) 20 MG tablet [Pharmacy Med Name: LISINOPRIL 20 MG Tablet] 90 tablet 1     Sig: TAKE 1 TABLET EVERY DAY    lovastatin (MEVACOR) 40 MG tablet [Pharmacy Med Name: LOVASTATIN 40 MG Tablet] 90 tablet 1     Sig: TAKE 1 TABLET EVERY NIGHT    metFORMIN (GLUCOPHAGE) 500 MG tablet [Pharmacy Med Name: Claudeen Lanius 500 MG Tablet] 180 tablet 2     Sig: TAKE 1 TABLET TWICE DAILY WITH MEALS       Last Filled: Metformin - 9/18/2018   Mevacor - 11/12/2018  Lisinopril - 11/12/2018    Patient Phone Number: [phone] (home)     Last appt: 1/15/2019   Next appt: 5/15/2019    Last Labs DM:   Lab Results   Component Value Date    LABA1C 6.8 01/15/2019     Last Lipid:   Lab Results   Component Value Date    CHOL 157 01/15/2019    TRIG 192 01/15/2019    HDL 41 01/15/2019    LDLCALC 78 01/15/2019     Last PSA:   Lab Results   Component Value Date    PSA 1.12 09/18/2018     Last Thyroid:   Lab Results   Component Value Date    TSH 0.94 09/18/2018

## 2019-05-15 ENCOUNTER — OFFICE VISIT (OUTPATIENT)
Dept: INTERNAL MEDICINE CLINIC | Age: 69
End: 2019-05-15
Payer: MEDICARE

## 2019-05-15 VITALS
HEIGHT: 65 IN | OXYGEN SATURATION: 98 % | WEIGHT: 220.8 LBS | DIASTOLIC BLOOD PRESSURE: 60 MMHG | SYSTOLIC BLOOD PRESSURE: 122 MMHG | HEART RATE: 63 BPM | TEMPERATURE: 98.4 F | BODY MASS INDEX: 36.79 KG/M2

## 2019-05-15 DIAGNOSIS — E55.9 VITAMIN D DEFICIENCY: ICD-10-CM

## 2019-05-15 DIAGNOSIS — E78.2 MIXED HYPERLIPIDEMIA: ICD-10-CM

## 2019-05-15 DIAGNOSIS — I10 ESSENTIAL HYPERTENSION: ICD-10-CM

## 2019-05-15 DIAGNOSIS — M17.0 PRIMARY OSTEOARTHRITIS OF BOTH KNEES: ICD-10-CM

## 2019-05-15 DIAGNOSIS — D50.9 IRON DEFICIENCY ANEMIA, UNSPECIFIED IRON DEFICIENCY ANEMIA TYPE: ICD-10-CM

## 2019-05-15 DIAGNOSIS — K21.9 GASTROESOPHAGEAL REFLUX DISEASE, ESOPHAGITIS PRESENCE NOT SPECIFIED: ICD-10-CM

## 2019-05-15 DIAGNOSIS — E11.8 TYPE 2 DIABETES MELLITUS WITH COMPLICATION, WITHOUT LONG-TERM CURRENT USE OF INSULIN (HCC): Primary | ICD-10-CM

## 2019-05-15 LAB — HBA1C MFR BLD: 6.6 %

## 2019-05-15 PROCEDURE — 83036 HEMOGLOBIN GLYCOSYLATED A1C: CPT | Performed by: INTERNAL MEDICINE

## 2019-05-15 PROCEDURE — 4040F PNEUMOC VAC/ADMIN/RCVD: CPT | Performed by: INTERNAL MEDICINE

## 2019-05-15 PROCEDURE — 99214 OFFICE O/P EST MOD 30 MIN: CPT | Performed by: INTERNAL MEDICINE

## 2019-05-15 PROCEDURE — G8417 CALC BMI ABV UP PARAM F/U: HCPCS | Performed by: INTERNAL MEDICINE

## 2019-05-15 PROCEDURE — 1123F ACP DISCUSS/DSCN MKR DOCD: CPT | Performed by: INTERNAL MEDICINE

## 2019-05-15 PROCEDURE — 3044F HG A1C LEVEL LT 7.0%: CPT | Performed by: INTERNAL MEDICINE

## 2019-05-15 PROCEDURE — 3017F COLORECTAL CA SCREEN DOC REV: CPT | Performed by: INTERNAL MEDICINE

## 2019-05-15 PROCEDURE — G8427 DOCREV CUR MEDS BY ELIG CLIN: HCPCS | Performed by: INTERNAL MEDICINE

## 2019-05-15 PROCEDURE — 1036F TOBACCO NON-USER: CPT | Performed by: INTERNAL MEDICINE

## 2019-05-15 PROCEDURE — 2022F DILAT RTA XM EVC RTNOPTHY: CPT | Performed by: INTERNAL MEDICINE

## 2019-05-15 RX ORDER — FERROUS SULFATE 325(65) MG
325 TABLET ORAL
COMMUNITY
Start: 2019-05-15

## 2019-05-15 ASSESSMENT — PATIENT HEALTH QUESTIONNAIRE - PHQ9
SUM OF ALL RESPONSES TO PHQ9 QUESTIONS 1 & 2: 0
SUM OF ALL RESPONSES TO PHQ QUESTIONS 1-9: 0
2. FEELING DOWN, DEPRESSED OR HOPELESS: 0
1. LITTLE INTEREST OR PLEASURE IN DOING THINGS: 0
SUM OF ALL RESPONSES TO PHQ QUESTIONS 1-9: 0

## 2019-05-15 ASSESSMENT — ENCOUNTER SYMPTOMS
WHEEZING: 0
NAUSEA: 0
CHEST TIGHTNESS: 0
SHORTNESS OF BREATH: 0
CONSTIPATION: 0
ABDOMINAL PAIN: 0
SORE THROAT: 0
RHINORRHEA: 0
DIARRHEA: 0
VOMITING: 0
COUGH: 0
TROUBLE SWALLOWING: 0
BLOOD IN STOOL: 0

## 2019-05-15 NOTE — PATIENT INSTRUCTIONS
1. Type 2 diabetes mellitus with complication, without long-term current use of insulin (HCC)  -Hemoglobin A1c of 6.6% shows diabetes is controlled  -Continue same medications  -Limit carbohydrates to 45 grams with meals and 15 grams with snacks  -monitor blood sugars  -Regular aerobic exercise  - POCT glycosylated hemoglobin (Hb A1C)    2. Essential hypertension  -stable  -Continue same medications  -Low sodium diet  -Regular aerobic exercise    3. Mixed hyperlipidemia  - LDL is at goal  -Continue same medications  -Low fat, low cholesterol diet  -Regular aerobic exercise    4. Gastroesophageal reflux disease, esophagitis presence not specified  -stable  -Continue same medications  -Decrease caffeine, avoid spicy foods, avoid tomato based foods  -Eat small meals instead of large meals  -Wait 2-3 hours after eating before lying down    5. Vitamin D deficiency  -stable  -Continue same medications    6. Iron deficiency anemia, unspecified iron deficiency anemia type  - Continue ferrous sulfate 325 (65 Fe) MG tablet; Take 1 tablet by mouth daily (with breakfast)    7. Primary osteoarthritis of both knees  - diclofenac sodium 1 % GEL;  Apply 2 g topically 2 times daily  Dispense: 100 g; Refill: 0  - referral to Janet Ng MD, Orthopedic Surgery (Sports), [facility]

## 2019-05-15 NOTE — PROGRESS NOTES
Keegan Covert   Date ofBirth:  1950    Date of Visit:  5/15/2019    Chief Complaint   Patient presents with    Diabetes    Gastroesophageal Reflux    Hyperlipidemia    Hypertension    Anemia       HPI  Diabetes Mellitus Type 2:   Current Medications: Metformin 500mg po bid  Diet: Pt decreases carbohydrates  Home blood sugar records: patient tests 1 time(s) per day and fasting averages 115   Any episodes of hypoglycemia? no  Eye exam current (within one year):  March 2019 per patient  Daily Aspirin? No  Current exercise: Pt states he tries to walk 5,000 steps per day     Hypertension:    Current Medications: Lisinopril 20mg po q day  Patient denies side effects  Home blood pressure monitoring: Yes and it averages 120/70  Patient decreases salt     Hyperlipidemia:    Current medications: Lovastatin 40mg po qhs  Patient denies side effects  Diet: Patient decreases fat and cholesterol.      GERD :   Patient sees GI. Pt has Paredes's Esophagus  EGD done on 5/9/19  Current Medications:Omeprazole 40mg po q day  Pt doesn't have much heartburn or reflux  Pt states he doesn't eat a lot of spicy foods. Pt eats tomato based foods. Pt drinks 2-3 cups of coffee per day.      Vitamin D deficiency:   Current medication: vitamin D 5,000 IU po q day  Patient denies fatigue and myalgias.     Iron deficiency anemia:  Current medication: Ferrous Sulfate 325mg po q day  Patient denies fatigue and cold intolerance    Knee osteoarthritis:  Patient states he has been getting a lot of stiffness in both knees with prolonged sitting and when he gets up in the morning  Patient states his left knee is bigger than his right knee  Patient states his knee pain is not as bad with taking pain medication and not over doing it. Patient takes Tylenol and Glucosamine. Patient would like a referral to Orthopedics, Dr. Yvette Mckeon         Review of Systems   Constitutional: Negative for activity change, chills, fatigue and fever.    HENT: Negative for congestion, postnasal drip, rhinorrhea, sore throat and trouble swallowing. Eyes: Negative for visual disturbance. Respiratory: Negative for cough, chest tightness, shortness of breath and wheezing. Cardiovascular: Negative for chest pain, palpitations and leg swelling. Gastrointestinal: Negative for abdominal pain, blood in stool, constipation, diarrhea, nausea and vomiting. Endocrine: Negative for cold intolerance. Genitourinary: Negative for dysuria, frequency and hematuria. Musculoskeletal: Positive for arthralgias and joint swelling. Negative for myalgias. Skin: Negative for wound. Neurological: Negative for dizziness, syncope, light-headedness, numbness and headaches. Psychiatric/Behavioral: Negative for dysphoric mood and sleep disturbance. The patient is not nervous/anxious. No Known Allergies  Outpatient Medications Marked as Taking for the 5/15/19 encounter (Office Visit) with Alfa Lerma MD   Medication Sig Dispense Refill    ferrous sulfate 325 (65 Fe) MG tablet Take 1 tablet by mouth daily (with breakfast)      diclofenac sodium 1 % GEL Apply 2 g topically 2 times daily 100 g 0    lisinopril (PRINIVIL;ZESTRIL) 20 MG tablet TAKE 1 TABLET EVERY DAY 90 tablet 0    lovastatin (MEVACOR) 40 MG tablet TAKE 1 TABLET EVERY NIGHT 90 tablet 0    metFORMIN (GLUCOPHAGE) 500 MG tablet TAKE 1 TABLET TWICE DAILY WITH MEALS 180 tablet 0    Blood Glucose Calibration (ACCU-CHEK SMARTVIEW CONTROL) LIQD Use as directed 1 each 3    ACCU-CHEK FASTCLIX LANCETS MISC 1 each by Does not apply route daily 100 each 3    blood glucose test strips (ACCU-CHEK SMARTVIEW) strip 1 each by Does not apply route daily As needed.  100 strip 3    magnesium oxide (MAG-OX) 400 MG tablet Take 400 mg by mouth daily      Alcohol Swabs (B-D SINGLE USE SWABS REGULAR) PADS USE AS DIRECTED 200 each 3    HOMEOPATHIC PRODUCTS EX Apply topically Indications: Gold Bond Diabetic Cream      Blood reviewed.         Results for POC orders placed in visit on 05/15/19   POCT glycosylated hemoglobin (Hb A1C)   Result Value Ref Range    Hemoglobin A1C 6.6 %     Lab Review   Orders Only on 01/15/2019   Component Date Value    Sodium 01/15/2019 136     Potassium 01/15/2019 5.0     Chloride 01/15/2019 96*    CO2 01/15/2019 24     Anion Gap 01/15/2019 16     Glucose 01/15/2019 117*    BUN 01/15/2019 13     CREATININE 01/15/2019 0.6*    GFR Non- 01/15/2019 >60     GFR  01/15/2019 >60     Calcium 01/15/2019 10.0     Total Protein 01/15/2019 7.9     Alb 01/15/2019 4.5     Albumin/Globulin Ratio 01/15/2019 1.3     Total Bilirubin 01/15/2019 <0.2     Alkaline Phosphatase 01/15/2019 58     ALT 01/15/2019 34     AST 01/15/2019 26     Globulin 01/15/2019 3.4     Cholesterol, Total 01/15/2019 157     Triglycerides 01/15/2019 192*    HDL 01/15/2019 41     LDL Calculated 01/15/2019 78     VLDL Cholesterol Calcula* 01/15/2019 38     Ferritin 01/15/2019 170.1     WBC 01/15/2019 8.8     RBC 01/15/2019 4.66     Hemoglobin 01/15/2019 13.0*    Hematocrit 01/15/2019 39.7*    MCV 01/15/2019 85.1     MCH 01/15/2019 28.0     MCHC 01/15/2019 32.9     RDW 01/15/2019 13.7     Platelets 18/00/6078 317     MPV 01/15/2019 9.0     Iron 01/15/2019 75     TIBC 01/15/2019 376     Iron Saturation 01/15/2019 20    Office Visit on 01/15/2019   Component Date Value    Hemoglobin A1C 01/15/2019 6.8     Microalbumin, Random Uri* 01/15/2019 13.70*    Creatinine, Ur 01/15/2019 86.0     Microalbumin Creatinine * 01/15/2019 159.3*    Color, UA 01/15/2019 YELLOW     Clarity, UA 01/15/2019 Clear     Glucose, Ur 01/15/2019 Negative     Bilirubin Urine 01/15/2019 Negative     Ketones, Urine 01/15/2019 Negative     Specific Gravity, UA 01/15/2019 1.021     Blood, Urine 01/15/2019 Negative     pH, UA 01/15/2019 6.0     Protein, UA 01/15/2019 30*    Urobilinogen, Urine 01/15/2019 0.2  Nitrite, Urine 01/15/2019 Negative     Leukocyte Esterase, Urine 01/15/2019 Negative     Microscopic Examination 01/15/2019 YES     Urine Type 01/15/2019 Voided     Urine Culture, Routine 01/15/2019 <50,000 CFU/ml mixed skin/urogenital kati. No further workup     Hyaline Casts, UA 01/15/2019 1     WBC, UA 01/15/2019 2     RBC, UA 01/15/2019 1     Epi Cells 01/15/2019 2          Assessment/Plan     1. Type 2 diabetes mellitus with complication, without long-term current use of insulin ([facility])  -Hemoglobin A1c of 6.6% shows diabetes is controlled  -Continue same medications  -Limit carbohydrates to 45 grams with meals and 15 grams with snacks  -monitor blood sugars  -Regular aerobic exercise  - POCT glycosylated hemoglobin (Hb A1C)    2. Essential hypertension  -stable  -Continue same medications  -Low sodium diet  -Regular aerobic exercise    3. Mixed hyperlipidemia  - LDL is at goal  -Continue same medications  -Low fat, low cholesterol diet  -Regular aerobic exercise    4. Gastroesophageal reflux disease, esophagitis presence not specified  -stable  -Continue same medications  -Decrease caffeine, avoid spicy foods, avoid tomato based foods  -Eat small meals instead of large meals  -Wait 2-3 hours after eating before lying down    5. Vitamin D deficiency  -stable  -Continue same medications    6. Iron deficiency anemia, unspecified iron deficiency anemia type  - Continue ferrous sulfate 325 (65 Fe) MG tablet; Take 1 tablet by mouth daily (with breakfast)    7. Primary osteoarthritis of both knees  - diclofenac sodium 1 % GEL; Apply 2 g topically 2 times daily  Dispense: 100 g; Refill: 0  - referral to [address], MD, Orthopedic Surgery (Sports), [facility] - VOLUNTEER RAMILA    Discussed medications with patient, who voiced understanding of their use and indications. All questions answered.       Return in about 4 months (around 9/15/2019) for annual physical exam.

## 2019-05-15 NOTE — PROGRESS NOTES
PHQ Scores 5/15/2019 2/22/2018 8/12/2016 6/3/2016 8/28/2015 8/18/2014   PHQ2 Score 0 0 0 0 0 0   PHQ9 Score 0 0 0 0 0 0     Interpretation of Total Score Depression Severity: 1-4 = Minimal depression, 5-9 = Mild depression, 10-14 = Moderate depression, 15-19 = Moderately severe depression, 20-27 = Severe depression

## 2019-05-31 ENCOUNTER — TELEPHONE (OUTPATIENT)
Dept: ORTHOPEDIC SURGERY | Age: 69
End: 2019-05-31

## 2019-05-31 ENCOUNTER — OFFICE VISIT (OUTPATIENT)
Dept: ORTHOPEDIC SURGERY | Age: 69
End: 2019-05-31
Payer: MEDICARE

## 2019-05-31 VITALS — HEIGHT: 65 IN | BODY MASS INDEX: 36.8 KG/M2 | RESPIRATION RATE: 16 BRPM | WEIGHT: 220.9 LBS

## 2019-05-31 DIAGNOSIS — M17.0 BILATERAL PRIMARY OSTEOARTHRITIS OF KNEE: ICD-10-CM

## 2019-05-31 DIAGNOSIS — M25.561 PAIN IN BOTH KNEES, UNSPECIFIED CHRONICITY: Primary | ICD-10-CM

## 2019-05-31 DIAGNOSIS — M25.562 PAIN IN BOTH KNEES, UNSPECIFIED CHRONICITY: Primary | ICD-10-CM

## 2019-05-31 PROCEDURE — G8417 CALC BMI ABV UP PARAM F/U: HCPCS | Performed by: ORTHOPAEDIC SURGERY

## 2019-05-31 PROCEDURE — G8427 DOCREV CUR MEDS BY ELIG CLIN: HCPCS | Performed by: ORTHOPAEDIC SURGERY

## 2019-05-31 PROCEDURE — 1036F TOBACCO NON-USER: CPT | Performed by: ORTHOPAEDIC SURGERY

## 2019-05-31 PROCEDURE — 3017F COLORECTAL CA SCREEN DOC REV: CPT | Performed by: ORTHOPAEDIC SURGERY

## 2019-05-31 PROCEDURE — 4040F PNEUMOC VAC/ADMIN/RCVD: CPT | Performed by: ORTHOPAEDIC SURGERY

## 2019-05-31 PROCEDURE — 1123F ACP DISCUSS/DSCN MKR DOCD: CPT | Performed by: ORTHOPAEDIC SURGERY

## 2019-05-31 PROCEDURE — 99214 OFFICE O/P EST MOD 30 MIN: CPT | Performed by: ORTHOPAEDIC SURGERY

## 2019-05-31 NOTE — TELEPHONE ENCOUNTER
05/31/2019 [place]   BILATERAL KNEES. NO AUTHORIZATION REQUIRED. NO PCP REFERRAL REQUIRED. CAN BUY& BILL. PER NOTES FOR THIS [employer].   AP

## 2019-05-31 NOTE — PROGRESS NOTES
Chief Complaint    Knee Pain (bilat knees)      History of Present Illness:  Ethel Swift is a 70 y.o. male. He Is here for evaluation of his bilateral knees. He does have known history of bilateral knee osteoarthritis. I saw him about 2-1/2 years ago which time we did give him cortisone injections. States that he only got about a week of relief from those cortisone injections. He does continue to use Tylenol as needed for pain. Does continue to have pain in the knees and feels like he is calm more stiff since last time I did see him. Denies any new injury to the knees. Medical History:  Patient's medications, allergies, past medical, surgical, social and family histories were reviewed and updated as appropriate. Review of Systems:  Pertinent items are noted in HPI  Review of systems reviewed from Patient History Form dated on 5/31/19 and available in the patient's chart under the Media tab. Vital Signs:  Resp 16   Ht 5' 5\" (1.651 m)   Wt 220 lb 14.4 oz (100.2 kg)   BMI 36.76 kg/m²     General Exam:   Constitutional: Patient is adequately groomed with no evidence of malnutrition  DTRs: Deep tendon reflexes are intact  Mental Status: The patient is oriented to time, place and person. The patient's mood and affect are appropriate. Lymphatic: The lymphatic examination bilaterally reveals all areas to be without enlargement or induration. Knee Examination:    Inspection:  He does have an overall varus alignment of the right knee. Left knee is in valgus. No swelling erythema noted     Palpation:  mild palpable effusion about the left knee. No significant effusion palpable in the right knee. Range of Motion:  right  Knee extends to 5° and flexes up to 115°. Left knee has a 10° flexion contracture and flexes up to 105°.     Strength:  He is able to do a straight leg raise     Special Tests:   no significant instability to varus and valgus stress testing.   Negative posterior drawer. Negative Apley Ventura     Skin: There are no rashes, ulcerations or lesions.     Gait: Walking with a normal gait    Additional Comments:       Additional Examinations:         Lower Back: Examination of the lower back does not show any tenderness, deformity or injury. Range of motion is unremarkable. There is no gross instability. There are no rashes, ulcerations or lesions. Strength and tone are normal.    Radiology:     X-rays obtained and reviewed in office:  Views 4 views of the right knee done traits no obvious fracture or dislocation or other osseous abnormalities. He does have significant degenerative change noted primarily within the medial compartment with complete loss of medial joint space as well as joint line sclerosis and osteophyte formation    4 views left knee demonstrate no obvious fracture dislocation. He does have significant degenerative change that is worse with the lateral compartment with complete loss of lateral joint space as well as joint line sclerosis and osteophyte formation         Assessment :  Bilateral knee osteoarthritis    Impression:  Encounter Diagnosis   Name Primary?  Pain in both knees, unspecified chronicity Yes       Office Procedures:  Orders Placed This Encounter   Procedures    XR KNEE LEFT (MIN 4 VIEWS)     Standing Status:   Future     Number of Occurrences:   1     Standing Expiration Date:   5/31/2020    XR KNEE RIGHT (MIN 4 VIEWS)     Standing Status:   Future     Number of Occurrences:   1     Standing Expiration Date:   5/31/2020       Treatment Plan:  I discussed the diagnosis and treatment options with him today. I would recommend at this time proceeding with Visco injections into the bilateral knees. He is agreeable with that plan. In addition on refer him to physical therapy for an osteoarthritis program.  He can continue to use Tylenol as he does get good relief from it.   I'll see him back once he is approved for his Visco

## 2019-06-07 ENCOUNTER — OFFICE VISIT (OUTPATIENT)
Dept: ORTHOPEDIC SURGERY | Age: 69
End: 2019-06-07
Payer: MEDICARE

## 2019-06-07 DIAGNOSIS — M17.0 BILATERAL PRIMARY OSTEOARTHRITIS OF KNEE: Primary | ICD-10-CM

## 2019-06-07 PROCEDURE — 20610 DRAIN/INJ JOINT/BURSA W/O US: CPT | Performed by: ORTHOPAEDIC SURGERY

## 2019-06-07 PROCEDURE — 99999 PR OFFICE/OUTPT VISIT,PROCEDURE ONLY: CPT | Performed by: ORTHOPAEDIC SURGERY

## 2019-06-13 ENCOUNTER — HOSPITAL ENCOUNTER (OUTPATIENT)
Dept: PHYSICAL THERAPY | Age: 69
Setting detail: THERAPIES SERIES
Discharge: HOME | End: 2019-06-13
Payer: MEDICARE

## 2019-06-13 PROCEDURE — 97110 THERAPEUTIC EXERCISES: CPT | Performed by: PHYSICAL THERAPIST

## 2019-06-13 PROCEDURE — 97161 PT EVAL LOW COMPLEX 20 MIN: CPT | Performed by: PHYSICAL THERAPIST

## 2019-06-13 NOTE — PLAN OF CARE
The [facility] and [facility]  [address], [place], [address] [facility]  Phone: [phone]   Fax:     [phone]                                                       Physical Therapy Certification    Dear Referring Practitioner: Dr Priscila Del Valle,    We had the pleasure of evaluating the following patient for physical therapy services at [address] [place] [address]. A summary of our findings can be found in the initial assessment below. This includes our plan of care. If you have any questions or concerns regarding these findings, please do not hesitate to contact me at the office phone number checked above. Thank you for the referral.       Physician Signature:_______________________________Date:__________________  By signing above (or electronic signature), therapists plan is approved by physician      Patient: Theron Duty   : 1950   MRN: [id number]  Referring Physician: Referring Practitioner: Dr Priscila Del Valle      Evaluation Date: 2019      Medical Diagnosis Information:  Diagnosis: Bilateral knee pain   M25.561, M25.562   Treatment Diagnosis: PT treatment diagnosis:  bilateral knee pain, stiffness, weakness                                         Insurance information: PT Insurance Information: [facility]   30 visits/yr,  $40 copay     Precautions/ Contra-indications: none  Latex Allergy:  [x]NO      []YES  Preferred Language for Healthcare:   [x]English       []other:    SUBJECTIVE: Patient stated complaint:  Bilateral knee pain that is chronic in nature. Symptoms are worse with activity and feels better with rest.  Knees feel very stiff and has difficulty when first getting up from a seated position. X-rays: (+) bilateral knee OA, CMP. Had first Monovisc injection last week.       Relevant Medical History:OA, HTN, DM   Functional Disability Index:LEFS: 54%  G-Codes  The patient demonstrates at least 40% but less than 60% impairment, limitation or restriction in:   - walking and moving around (mobility)     Pain Scale: 2-6/10  Easing factors: rest, Tyelnol  Provocative factors: stairs, kneeling, ladder, getting in/out of car, kneeling     Type: []Constant   [x]Intermittent  []Radiating []Localized []other:     Numbness/Tingling: none    Functional Limitations/Impairments: []Sitting []Standing []Walking    [x]Squatting/bending  [x]Stairs           []ADL's  []Transfers []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    Occupation/School:  Retired     Living Status/Prior Level of Function: Independent with ADLs and IADLs  (insert highest prior level of function)    OBJECTIVE:     ROM LEFT RIGHT   HIP Flex     HIP Abd     HIP Ext     HIP IR     HIP ER     Knee ext -10 -5   Knee Flex 103 114   Ankle PF     Ankle DF     Ankle In     Ankle Ev     Strength  LEFT RIGHT   HIP Flexors     HIP Abductors 4- 4-   HIP Ext     Hip ER     Knee EXT (quad) 4 4+   Knee Flex (HS) 4+ 4+   Ankle DF     Ankle PF     Ankle Inv     Ankle EV          Circumference  Mid  7 cm       LE Dermatomes     LE myotomes       Single Leg Squat: n/a due to pain  Single Leg Stance: decreased bilaterally    Joint mobility:    []Normal    [x]Hypo   []Hyper    Palpation: L patellar borders, medial joint line; R medial joint line    Functional Mobility/Transfers: independent    Posture: Crepitus L > R with knee ROM    Bandages/Dressings/Incisions: n/a    Gait: (include devices/WB status) antalgic gait with decreased heel strike bilaterally. Forward trunk lean in standing, walking    Orthopedic Special Tests: n/a                       [x] Patient history, allergies, meds reviewed. Medical chart reviewed. See intake form. Review Of Systems (ROS):  [x]Performed Review of systems (Integumentary, CardioPulmonary, Neurological) by intake and observation. Intake form has been scanned into medical record.  Patient has been instructed to contact their primary care physician regarding ROS issues if not already being addressed at this time. Co-morbidities/Complexities (which will affect course of rehabilitation):   []None           Arthritic conditions   []Rheumatoid arthritis (M05.9)  [x]Osteoarthritis (M19.91)   Cardiovascular conditions   [x]Hypertension (I10)  []Hyperlipidemia (E78.5)  []Angina pectoris (I20)  []Atherosclerosis (I70)   Musculoskeletal conditions   []Disc pathology   []Congenital spine pathologies   []Prior surgical intervention  []Osteoporosis (M81.8)  []Osteopenia (M85.8)   Endocrine conditions   []Hypothyroid (E03.9)  []Hyperthyroid Gastrointestinal conditions   []Constipation (J03.37)   Metabolic conditions   []Morbid obesity (E66.01)  [x]Diabetes type 1(E10.65) or 2 (E11.65)   []Neuropathy (G60.9)     Pulmonary conditions   []Asthma (J45)  []Coughing   []COPD (J44.9)   Psychological Disorders  []Anxiety (F41.9)  []Depression (F32.9)   []Other:   []Other:          Barriers to/and or personal factors that will affect rehab potential:              []Age  []Sex              []Motivation/Lack of Motivation                        []Co-Morbidities              []Cognitive Function, education/learning barriers              []Environmental, home barriers              []profession/work barriers  []past PT/medical experience  []other:  Justification: OA is degenerative, HTN/DM may limit healing and exercise intensity    PACEMAKER:  - Denied having a pacemaker that would contraindicate the use of electrical modalities. METAL IMPLANTS:  - Denied metal implants that would contraindicate the use of thermal modalities. CANCER HISTORY:  - Denied a history of cancer that would contraindicate thermal modalities. Falls Risk Assessment (30 days):   [x] Falls Risk assessed and no intervention required.   [] Falls Risk assessed and Patient requires intervention due to being higher risk   TUG score (>12s at risk): [] Falls education provided, including         ASSESSMENT:   Functional Impairments:     [x]Noted lumbar/proximal hip/LE joint hypomobility   [x]Decreased LE functional ROM   []Decreased core/proximal hip strength and neuromuscular control   [x]Decreased LE functional strength   [x]Reduced balance/proprioceptive control   []other:      Functional Activity Limitations (from functional questionnaire and intake)   []Reduced ability to tolerate prolonged functional positions   [x]Reduced ability or difficulty with changes of positions or transfers between positions   []Reduced ability to maintain good posture and demonstrate good body mechanics with sitting, bending, and lifting   []Reduced ability to sleep   [] Reduced ability or tolerance with driving and/or computer work   []Reduced ability to perform lifting, carrying tasks   [x]Reduced ability to squat   []Reduced ability to forward bend   [x]Reduced ability to ambulate prolonged functional periods/distances/surfaces   [x]Reduced ability to ascend/descend stairs   []Reduced ability to run, hop, cut or jump   []other:    Participation Restrictions   []Reduced participation in self care activities   [x]Reduced participation in home management activities   []Reduced participation in work activities   []Reduced participation in social activities. [x]Reduced participation in sport/recreation activities. Classification :    []Signs/symptoms consistent with post-surgical status including decreased ROM, strength and function.    []Signs/symptoms consistent with joint sprain/strain   []Signs/symptoms consistent with patella-femoral syndrome   [x]Signs/symptoms consistent with knee OA/hip OA   []Signs/symptoms consistent with internal derangement of knee/Hip   []Signs/symptoms consistent with functional hip weakness/NMR control      []Signs/symptoms consistent with tendinitis/tendinosis    []signs/symptoms consistent with pathology which may benefit from Dry needling

## 2019-06-13 NOTE — FLOWSHEET NOTE
Charges:  Timed Code Treatment Minutes: 25   Total Treatment Minutes: 45     [x] EVAL (LOW) 28137 (typically 20 minutes face-to-face)  [] EVAL (MOD) 34278 (typically 30 minutes face-to-face)  [] EVAL (HIGH) 55046 (typically 45 minutes face-to-face)  [] RE-EVAL     [x] QM(64320) x  2   [] IONTO  [] NMR (38594) x     [] VASO  [] Manual (09239) x      [] Other:  [] TA x      [] Mech Traction (49102)  [] ES(attended) (29794)      [] ES (un) (28030):     GOALS: *  Short Term Goals: To be achieved in: 2 weeks  1. Independent in HEP and progression per patient tolerance, in order to prevent re-injury. 2. Patient will have a decrease in pain to facilitate improvement in movement, function, and ADLs as indicated by Functional Deficits. Long Term Goals: To be achieved in: 5 weeks  1. Disability index score of 30% or less for the LEFS to assist with reaching prior level of function. 2. Patient will demonstrate increased AROM to 0-120 bilateral knees to allow for proper joint functioning as indicated by patients Functional Deficits. 3. Patient will demonstrate an increase in Strength to 5/5 knee ext/hip abd to allow for proper functional mobility as indicated by patients Functional Deficits. 4. Patient will return to walking up/down one flight of stairs without increased symptoms or restriction. Progression Towards Functional goals:  [] Patient is progressing as expected towards functional goals listed. [] Progression is slowed due to complexities listed. [] Progression has been slowed due to co-morbidities.   [x] Plan just implemented, too soon to assess goals progression  [] Other:     ASSESSMENT:  See eval    Treatment/Activity Tolerance:  [x] Patient tolerated treatment well [] Patient limited by fatique  [] Patient limited by pain  [] Patient limited by other medical complications  [] Other:     Prognosis: [] Good [x] Fair  [] Poor    Patient Requires Follow-up: [x] Yes  [] No    PLAN FOR NEXT SESSION:     PLAN: See eval  [] Continue per plan of care [] Alter current plan (see comments)  [x] Plan of care initiated [] Hold pending MD visit [] Discharge    *If patient does not return for further follow ups after this date. Please consider this as the patients discharge from physical therapy.        Electronically signed by: Marta Villarreal PT, [id number]

## 2019-06-18 ENCOUNTER — HOSPITAL ENCOUNTER (OUTPATIENT)
Dept: PHYSICAL THERAPY | Age: 69
Setting detail: THERAPIES SERIES
Discharge: HOME | End: 2019-06-18
Payer: MEDICARE

## 2019-06-18 PROCEDURE — 97110 THERAPEUTIC EXERCISES: CPT | Performed by: PHYSICAL THERAPIST

## 2019-06-18 PROCEDURE — 97140 MANUAL THERAPY 1/> REGIONS: CPT | Performed by: PHYSICAL THERAPIST

## 2019-06-18 NOTE — FLOWSHEET NOTE
The [address] [place] [address] [facility]    Physical Therapy Daily Treatment Note  Date:  2019    Patient Name:  Fer Bustos  \"Stewart\"    :  1950  MRN: [id number]  Restrictions/Precautions:    Medical/Treatment Diagnosis Information:  · Diagnosis: Bilateral knee pain   M25.561, M25.562  · Treatment Diagnosis: PT treatment diagnosis:  bilateral knee pain, stiffness, weakness  Insurance/Certification information:  PT Insurance Information: [employer]   30 visits/yr,  $40 copay  Physician Information:  Referring Practitioner: Dr Priscilla Naidu of care signed (Y/N):     Date of Patient follow up with Physician:     G-Code (if applicable):      Date G-Code Applied: The patient demonstrates at least 40% but less than 60% impairment, limitation or restriction in:   - walking and moving around (mobility)     Progress Note: [x]  Yes  []  No  Next due by: Visit #10       Latex Allergy:  [x]NO      []YES  Preferred Language for Healthcare:   [x]English       []other:    Visit # Insurance Allowable   2 30     Auth Required   []  Yes    [] No    Visits Approved  Date Ranged-       Pain level:  2-6/10     SUBJECTIVE:  States the knees are feeling fine today, not much change since the first visit. OBJECTIVE: See eval   Observation:    Test measurements:      RESTRICTIONS/PRECAUTIONS: OA,     Exercises/Interventions:     Exercise Sets/Reps Notes Last Progression   Gastroc Stretch 3x30'' B belt    Heelslides       LLLD Wallslide      HS Stretch:  Tableside  3x30'' B     SAQ 15x B     LAQ      SLR Flex 20x B     SLR Abd 20x B      SLR Ext      SLR Add.       Clamshells 20x B     Bridges 15x     HR/TR      Ankle Theraband      BAPS      Bike      Elliptical      Standing Stretch:  (insert muscles)      Weight Shifting      Lateral Walk      Squats      Single Leg Stance Balance            Fwd step up 6'' 2x10 B     TKE on [facility] 60# 20x B                 Manuals-B knees 15' Therapeutic Exercise and NMR EXR  [x] (26685) Provided verbal/tactile cueing for activities related to strengthening, flexibility, endurance, ROM for improvements in LE, proximal hip, and core control with self care, mobility, lifting, ambulation.  [] (17525) Provided verbal/tactile cueing for activities related to improving balance, coordination, kinesthetic sense, posture, motor skill, proprioception  to assist with LE, proximal hip, and core control in self care, mobility, lifting, ambulation and eccentric single leg control.      NMR and Therapeutic Activities:    [] (34417 or 17936) Provided verbal/tactile cueing for activities related to improving balance, coordination, kinesthetic sense, posture, motor skill, proprioception and motor activation to allow for proper function of core, proximal hip and LE with self care and ADLs  [] (94344) Gait Re-education- Provided training and instruction to the patient for proper LE, core and proximal hip recruitment and positioning and eccentric body weight control with ambulation re-education including up and down stairs     Home Exercise Program:    [x] (88118) Reviewed/Progressed HEP activities related to strengthening, flexibility, endurance, ROM of core, proximal hip and LE for functional self-care, mobility, lifting and ambulation/stair navigation   [] (04437)Reviewed/Progressed HEP activities related to improving balance, coordination, kinesthetic sense, posture, motor skill, proprioception of core, proximal hip and LE for self care, mobility, lifting, and ambulation/stair navigation      Manual Treatments:  PROM / Scar Mobs / STM/Rollerstick / Knee mobs (Flex./Ext.)  Stretch: H.S. / ITB / Piriformis / Marilu Mix / Groin / Hip Flexor   [x] (33231) Provided manual therapy to mobilize LE, proximal hip and/or LS spine soft tissue/joints for the purpose of modulating pain, promoting relaxation,  increasing ROM, reducing/eliminating soft tissue Treatment/Activity Tolerance:  [x] Patient tolerated treatment well [] Patient limited by fatique  [] Patient limited by pain  [] Patient limited by other medical complications  [] Other:     Prognosis: [] Good [x] Fair  [] Poor    Patient Requires Follow-up: [x] Yes  [] No    PLAN FOR NEXT SESSION:     PLAN: See eval  [x] Continue per plan of care [] Alter current plan (see comments)  [] Plan of care initiated [] Hold pending MD visit [] Discharge    *If patient does not return for further follow ups after this date. Please consider this as the patients discharge from physical therapy.        Electronically signed by: Alex LONDONO, [id number]

## 2019-06-20 ENCOUNTER — HOSPITAL ENCOUNTER (OUTPATIENT)
Dept: PHYSICAL THERAPY | Age: 69
Setting detail: THERAPIES SERIES
Discharge: HOME | End: 2019-06-20
Payer: MEDICARE

## 2019-06-20 PROCEDURE — 97140 MANUAL THERAPY 1/> REGIONS: CPT | Performed by: PHYSICAL THERAPIST

## 2019-06-20 PROCEDURE — 97110 THERAPEUTIC EXERCISES: CPT | Performed by: PHYSICAL THERAPIST

## 2019-06-20 NOTE — FLOWSHEET NOTE
The [address] [place] [address] [facility]    Physical Therapy Daily Treatment Note  Date:  2019    Patient Name:  Kennedi Villa  \"Stewart\"    :  1950  MRN: [id number]  Restrictions/Precautions:    Medical/Treatment Diagnosis Information:  · Diagnosis: Bilateral knee pain   M25.561, M25.562  · Treatment Diagnosis: PT treatment diagnosis:  bilateral knee pain, stiffness, weakness  Insurance/Certification information:  PT Insurance Information: [employer]   30 visits/yr,  $40 copay  Physician Information:  Referring Practitioner: Dr Emmanuel Anglin of care signed (Y/N):     Date of Patient follow up with Physician:     G-Code (if applicable):      Date G-Code Applied: The patient demonstrates at least 40% but less than 60% impairment, limitation or restriction in:   - walking and moving around (mobility)     Progress Note: [x]  Yes  []  No  Next due by: Visit #10       Latex Allergy:  [x]NO      []YES  Preferred Language for Healthcare:   [x]English       []other:    Visit # Insurance Allowable   3 30     Auth Required   []  Yes    [] No    Visits Approved  Date Ranged-       Pain level:  2-6/10     SUBJECTIVE:  Reports the knees are doing fine today and feeling a little better overall since starting PT.      OBJECTIVE: See eval   Observation:    Test measurements:      RESTRICTIONS/PRECAUTIONS: OA,     Exercises/Interventions:     Exercise Sets/Reps Notes Last Progression   Gastroc Stretch 3x30'' B incline    Heelslides       LLLD Wallslide      HS Stretch:  Tableside  3x30'' B     SAQ 15x B     LAQ      SLR Flex 20x B     SLR Abd 20x B      SLR Ext      SLR Add.       Clamshells 2x15 B     Bridges w/ ball squeeze 2x10     HR/TR      Ankle Theraband      BAPS      Bike 5'     Elliptical      Standing Stretch:  (insert muscles)      Weight Shifting      Lateral Walk      Squats      Single Leg Stance Balance      Leg press 120# 2x10     Fwd step up 6'' 2x10 B TKE on [facility] 60# 20x B                 Manuals-B knees 15'         Therapeutic Exercise and NMR EXR  [x] (92957) Provided verbal/tactile cueing for activities related to strengthening, flexibility, endurance, ROM for improvements in LE, proximal hip, and core control with self care, mobility, lifting, ambulation.  [] (90884) Provided verbal/tactile cueing for activities related to improving balance, coordination, kinesthetic sense, posture, motor skill, proprioception  to assist with LE, proximal hip, and core control in self care, mobility, lifting, ambulation and eccentric single leg control.      NMR and Therapeutic Activities:    [] (89355 or 19065) Provided verbal/tactile cueing for activities related to improving balance, coordination, kinesthetic sense, posture, motor skill, proprioception and motor activation to allow for proper function of core, proximal hip and LE with self care and ADLs  [] (79378) Gait Re-education- Provided training and instruction to the patient for proper LE, core and proximal hip recruitment and positioning and eccentric body weight control with ambulation re-education including up and down stairs     Home Exercise Program:    [x] (55865) Reviewed/Progressed HEP activities related to strengthening, flexibility, endurance, ROM of core, proximal hip and LE for functional self-care, mobility, lifting and ambulation/stair navigation   [] (89883)Reviewed/Progressed HEP activities related to improving balance, coordination, kinesthetic sense, posture, motor skill, proprioception of core, proximal hip and LE for self care, mobility, lifting, and ambulation/stair navigation      Manual Treatments:  PROM / Scar Mobs / STM/Rollerstick / Knee mobs (Flex./Ext.)  Stretch: H.S. / ITB / Piriformis / Quad / Groin / Hip Flexor   [x] (37522) Provided manual therapy to mobilize LE, proximal hip and/or LS spine soft tissue/joints for the purpose of modulating pain, promoting relaxation,  increasing ROM, reducing/eliminating soft tissue swelling/inflammation/restriction, improving soft tissue extensibility and allowing for proper ROM for normal function with self care, mobility, lifting and ambulation. Modalities:      Charges:  Timed Code Treatment Minutes: 45   Total Treatment Minutes: 45     [] EVAL (LOW) 71181 (typically 20 minutes face-to-face)  [] EVAL (MOD) 98453 (typically 30 minutes face-to-face)  [] EVAL (HIGH) 12060 (typically 45 minutes face-to-face)  [] RE-EVAL     [x] RJ(89378) x  2   [] IONTO  [] NMR (72679) x     [] VASO  [x] Manual (87549) x 1      [] Other:  [] TA x      [] Mech Traction (13223)  [] ES(attended) (45029)      [] ES (un) (51034):     GOALS: *  Short Term Goals: To be achieved in: 2 weeks  1. Independent in HEP and progression per patient tolerance, in order to prevent re-injury. 2. Patient will have a decrease in pain to facilitate improvement in movement, function, and ADLs as indicated by Functional Deficits. Long Term Goals: To be achieved in: 5 weeks  1. Disability index score of 30% or less for the LEFS to assist with reaching prior level of function. 2. Patient will demonstrate increased AROM to 0-120 bilateral knees to allow for proper joint functioning as indicated by patients Functional Deficits. 3. Patient will demonstrate an increase in Strength to 5/5 knee ext/hip abd to allow for proper functional mobility as indicated by patients Functional Deficits. 4. Patient will return to walking up/down one flight of stairs without increased symptoms or restriction. Progression Towards Functional goals:  [x] Patient is progressing as expected towards functional goals listed. [] Progression is slowed due to complexities listed. [] Progression has been slowed due to co-morbidities. [] Plan just implemented, too soon to assess goals progression  [] Other:     ASSESSMENT:  Tolerated Rx well with no increase in knee pain.   Hips fatigued easily with glut med strengthening activities. Treatment/Activity Tolerance:  [x] Patient tolerated treatment well [] Patient limited by fatique  [] Patient limited by pain  [] Patient limited by other medical complications  [] Other:     Prognosis: [] Good [x] Fair  [] Poor    Patient Requires Follow-up: [x] Yes  [] No    PLAN FOR NEXT SESSION:     PLAN: See eval  [x] Continue per plan of care [] Alter current plan (see comments)  [] Plan of care initiated [] Hold pending MD visit [] Discharge    *If patient does not return for further follow ups after this date. Please consider this as the patients discharge from physical therapy.        Electronically signed by: Anel Xiong PT, [id number]

## 2019-06-24 DIAGNOSIS — M17.0 PRIMARY OSTEOARTHRITIS OF BOTH KNEES: ICD-10-CM

## 2019-06-25 ENCOUNTER — HOSPITAL ENCOUNTER (OUTPATIENT)
Dept: PHYSICAL THERAPY | Age: 69
Setting detail: THERAPIES SERIES
Discharge: HOME | End: 2019-06-25
Payer: MEDICARE

## 2019-06-25 PROCEDURE — 97110 THERAPEUTIC EXERCISES: CPT | Performed by: PHYSICAL THERAPIST

## 2019-06-25 PROCEDURE — 97140 MANUAL THERAPY 1/> REGIONS: CPT | Performed by: PHYSICAL THERAPIST

## 2019-06-25 NOTE — FLOWSHEET NOTE
The [address] [place] [address] [facility]    Physical Therapy Daily Treatment Note  Date:  2019    Patient Name:  Ethel Swift  \"Stewart\"    :  1950  MRN: [id number]  Restrictions/Precautions:    Medical/Treatment Diagnosis Information:  · Diagnosis: Bilateral knee pain   M25.561, M25.562  · Treatment Diagnosis: PT treatment diagnosis:  bilateral knee pain, stiffness, weakness  Insurance/Certification information:  PT Insurance Information: [employer]   30 visits/yr,  $40 copay  Physician Information:  Referring Practitioner: Dr Jordan Melendrez of care signed (Y/N):     Date of Patient follow up with Physician:     G-Code (if applicable):      Date G-Code Applied: The patient demonstrates at least 40% but less than 60% impairment, limitation or restriction in:   - walking and moving around (mobility)     Progress Note: [x]  Yes  []  No  Next due by: Visit #10       Latex Allergy:  [x]NO      []YES  Preferred Language for Healthcare:   [x]English       []other:    Visit # Insurance Allowable   4 30     Auth Required   []  Yes    [] No    Visits Approved  Date Ranged-       Pain level:  2-6/10     SUBJECTIVE:  States the knees are doing fine, not much change over the past week. OBJECTIVE: See eval   Observation:    Test measurements:      RESTRICTIONS/PRECAUTIONS: OA,     Exercises/Interventions:     Exercise Sets/Reps Notes Last Progression   Gastroc Stretch 3x30'' B incline    Heelslides       LLLD Wallslide      HS Stretch:  Tableside  3x30'' B     SAQ 15x B     LAQ      SLR Flex 20x B, 3'' hold     SLR Abd 20x B  Held on     SLR Ext      SLR Add.       Clamshells 2x15 B     Bridges w/ ball squeeze 2x10  Held on    HR/TR      Ankle Theraband      BAPS      Bike 5'     Elliptical      Standing Stretch:  (insert muscles)      Weight Shifting      Lateral Walk      Squats      Single Leg Stance Balance      Leg press 120# 3x10     Fwd step up 6'' Held the last 2 exercises due to discomfort. Leg cramps decreased with weight bearing exercises. Treatment/Activity Tolerance:  [x] Patient tolerated treatment well [] Patient limited by fatique  [] Patient limited by pain  [] Patient limited by other medical complications  [] Other:     Prognosis: [] Good [x] Fair  [] Poor    Patient Requires Follow-up: [x] Yes  [] No    PLAN FOR NEXT SESSION:     PLAN: See eval  [x] Continue per plan of care [] Alter current plan (see comments)  [] Plan of care initiated [] Hold pending MD visit [] Discharge    *If patient does not return for further follow ups after this date. Please consider this as the patients discharge from physical therapy.        Electronically signed by: Alli Edgar PT, [id number]

## 2019-06-27 ENCOUNTER — HOSPITAL ENCOUNTER (OUTPATIENT)
Dept: PHYSICAL THERAPY | Age: 69
Setting detail: THERAPIES SERIES
Discharge: HOME | End: 2019-06-27
Payer: MEDICARE

## 2019-06-27 PROCEDURE — 97140 MANUAL THERAPY 1/> REGIONS: CPT | Performed by: PHYSICAL THERAPIST

## 2019-06-27 PROCEDURE — 97110 THERAPEUTIC EXERCISES: CPT | Performed by: PHYSICAL THERAPIST

## 2019-06-27 NOTE — FLOWSHEET NOTE
The [address] [place] [address] and Sports Rehabilitation, [facility]    Physical Therapy Daily Treatment Note  Date:  2019    Patient Name:  Ethel Swift  \"Stewart\"    :  1950  MRN: [id number]  Restrictions/Precautions:    Medical/Treatment Diagnosis Information:  · Diagnosis: Bilateral knee pain   M25.561, M25.562  · Treatment Diagnosis: PT treatment diagnosis:  bilateral knee pain, stiffness, weakness  Insurance/Certification information:  PT Insurance Information: [employer]   30 visits/yr,  $40 copay  Physician Information:  Referring Practitioner: Dr Jordan Melendrez of care signed (Y/N):     Date of Patient follow up with Physician:     G-Code (if applicable):      Date G-Code Applied: The patient demonstrates at least 40% but less than 60% impairment, limitation or restriction in:   - walking and moving around (mobility)     Progress Note: [x]  Yes  []  No  Next due by: Visit #10       Latex Allergy:  [x]NO      []YES  Preferred Language for Healthcare:   [x]English       []other:    Visit # Insurance Allowable   5 30     Auth Required   []  Yes    [] No    Visits Approved  Date Ranged-       Pain level:  2-6/10     SUBJECTIVE:  Reports no new problems since the last visit and states the knees are doing fine this morning. OBJECTIVE: See eval   Observation:    Test measurements:      RESTRICTIONS/PRECAUTIONS: OA,     Exercises/Interventions:     Exercise Sets/Reps Notes Last Progression   Gastroc Stretch 3x30'' B incline    Heelslides       LLLD Wallslide      HS Stretch:  Tableside  3x30'' B     SAQ 15x B     LAQ      SLR Flex 20x B, 3'' hold          SLR Ext      SLR Add.       Clamshells 2x15 B     Bridges w/ ball squeeze 2x10     HR/TR      Ankle Theraband      BAPS      Bike 5'     Elliptical      Standing Stretch:  (insert muscles)      Weight Shifting      Lateral Walk      Squats      Single Leg Stance Balance      Leg press 120# 3x10     Fwd step up 8'' 2x10 B     TKE on [facility] 60# 30x B     RODRI: abd 45# 30x B           Manuals-B knees 15'         Therapeutic Exercise and NMR EXR  [x] (41090) Provided verbal/tactile cueing for activities related to strengthening, flexibility, endurance, ROM for improvements in LE, proximal hip, and core control with self care, mobility, lifting, ambulation.  [] (37942) Provided verbal/tactile cueing for activities related to improving balance, coordination, kinesthetic sense, posture, motor skill, proprioception  to assist with LE, proximal hip, and core control in self care, mobility, lifting, ambulation and eccentric single leg control.      NMR and Therapeutic Activities:    [] (03333 or 63999) Provided verbal/tactile cueing for activities related to improving balance, coordination, kinesthetic sense, posture, motor skill, proprioception and motor activation to allow for proper function of core, proximal hip and LE with self care and ADLs  [] (79508) Gait Re-education- Provided training and instruction to the patient for proper LE, core and proximal hip recruitment and positioning and eccentric body weight control with ambulation re-education including up and down stairs     Home Exercise Program:    [x] (23943) Reviewed/Progressed HEP activities related to strengthening, flexibility, endurance, ROM of core, proximal hip and LE for functional self-care, mobility, lifting and ambulation/stair navigation   [] (49788)Reviewed/Progressed HEP activities related to improving balance, coordination, kinesthetic sense, posture, motor skill, proprioception of core, proximal hip and LE for self care, mobility, lifting, and ambulation/stair navigation      Manual Treatments:  PROM / Scar Mobs / STM/Rollerstick / Knee mobs (Flex./Ext.)  Stretch: H.S. / ITB / Piriformis / Quad / Groin / Hip Flexor   [x] (93781) Provided manual therapy to mobilize LE, proximal hip and/or LS spine soft tissue/joints for the purpose of modulating pain, promoting relaxation, increasing ROM, reducing/eliminating soft tissue swelling/inflammation/restriction, improving soft tissue extensibility and allowing for proper ROM for normal function with self care, mobility, lifting and ambulation. Modalities:      Charges:  Timed Code Treatment Minutes: 45   Total Treatment Minutes: 45     [] EVAL (LOW) 81237 (typically 20 minutes face-to-face)  [] EVAL (MOD) 72800 (typically 30 minutes face-to-face)  [] EVAL (HIGH) 16629 (typically 45 minutes face-to-face)  [] RE-EVAL     [x] VX(70800) x  2   [] IONTO  [] NMR (05573) x     [] VASO  [x] Manual (15187) x 1      [] Other:  [] TA x      [] Mech Traction (27160)  [] ES(attended) (60286)      [] ES (un) (74862):     GOALS: *  Short Term Goals: To be achieved in: 2 weeks  1. Independent in HEP and progression per patient tolerance, in order to prevent re-injury. 2. Patient will have a decrease in pain to facilitate improvement in movement, function, and ADLs as indicated by Functional Deficits. Long Term Goals: To be achieved in: 5 weeks  1. Disability index score of 30% or less for the LEFS to assist with reaching prior level of function. 2. Patient will demonstrate increased AROM to 0-120 bilateral knees to allow for proper joint functioning as indicated by patients Functional Deficits. 3. Patient will demonstrate an increase in Strength to 5/5 knee ext/hip abd to allow for proper functional mobility as indicated by patients Functional Deficits. 4. Patient will return to walking up/down one flight of stairs without increased symptoms or restriction. Progression Towards Functional goals:  [x] Patient is progressing as expected towards functional goals listed. [] Progression is slowed due to complexities listed. [] Progression has been slowed due to co-morbidities. [] Plan just implemented, too soon to assess goals progression  [] Other:     ASSESSMENT:  Good joint mobility/ROM with the R knee.   L knee continues to lack full extension. No c/o cramps with TE today. Treatment/Activity Tolerance:  [x] Patient tolerated treatment well [] Patient limited by fatique  [] Patient limited by pain  [] Patient limited by other medical complications  [] Other:     Prognosis: [] Good [x] Fair  [] Poor    Patient Requires Follow-up: [x] Yes  [] No    PLAN FOR NEXT SESSION:     PLAN: See eval  [x] Continue per plan of care [] Alter current plan (see comments)  [] Plan of care initiated [] Hold pending MD visit [] Discharge    *If patient does not return for further follow ups after this date. Please consider this as the patients discharge from physical therapy.        Electronically signed by: Roni Martinez PT, [id number]

## 2019-07-01 ENCOUNTER — HOSPITAL ENCOUNTER (OUTPATIENT)
Dept: PHYSICAL THERAPY | Age: 69
Setting detail: THERAPIES SERIES
Discharge: HOME | End: 2019-07-01
Payer: MEDICARE

## 2019-07-01 PROCEDURE — 97110 THERAPEUTIC EXERCISES: CPT | Performed by: PHYSICAL THERAPIST

## 2019-07-01 PROCEDURE — 97140 MANUAL THERAPY 1/> REGIONS: CPT | Performed by: PHYSICAL THERAPIST

## 2019-07-03 ENCOUNTER — HOSPITAL ENCOUNTER (OUTPATIENT)
Dept: PHYSICAL THERAPY | Age: 69
Setting detail: THERAPIES SERIES
Discharge: HOME | End: 2019-07-03
Payer: MEDICARE

## 2019-07-03 PROCEDURE — 97140 MANUAL THERAPY 1/> REGIONS: CPT | Performed by: PHYSICAL THERAPIST

## 2019-07-03 PROCEDURE — 97110 THERAPEUTIC EXERCISES: CPT | Performed by: PHYSICAL THERAPIST

## 2019-07-03 NOTE — FLOWSHEET NOTE
Patient tolerated treatment well [] Patient limited by fatique  [] Patient limited by pain  [] Patient limited by other medical complications  [] Other:     Prognosis: [] Good [x] Fair  [] Poor    Patient Requires Follow-up: [x] Yes  [] No    PLAN FOR NEXT SESSION:     PLAN: Continue x 1 visit  [x] Continue per plan of care [] Alter current plan (see comments)  [] Plan of care initiated [] Hold pending MD visit [] Discharge    *If patient does not return for further follow ups after this date. Please consider this as the patients discharge from physical therapy.        Electronically signed by: Jese Chao PT, [id number]

## 2019-07-08 ENCOUNTER — HOSPITAL ENCOUNTER (OUTPATIENT)
Dept: PHYSICAL THERAPY | Age: 69
Setting detail: THERAPIES SERIES
Discharge: HOME | End: 2019-07-08
Payer: MEDICARE

## 2019-07-08 PROCEDURE — 97110 THERAPEUTIC EXERCISES: CPT | Performed by: SPECIALIST/TECHNOLOGIST

## 2019-07-08 PROCEDURE — 97140 MANUAL THERAPY 1/> REGIONS: CPT | Performed by: SPECIALIST/TECHNOLOGIST

## 2019-07-08 NOTE — DISCHARGE SUMMARY
The [address] [facility] and [address] [facility]       Physical Therapy Discharge  Date: 2019        Patient Name:  Gianna Mcgovern    :  1950  MRN: [id number]  Referring Physician:Dr. Krissy Oseguera  Diagnosis:Bilateral knee pain                        ICD Code:M25.561  [] Surgical [x] Conservative  Therapy Diagnosis/Practice Pattern:Bilateral knee pain and weakness      Total number of visits: 8   Reporting Period:   Beginning Date:19   End Date:19    OBJECTIVE  Test used Initial score Discharge Score   Pain Summary  6/10 4/10   Functional questionnaire LEFS 54% 44%   Functional Testing        R/L R/L   ROM Knee flex 114° / 103° 116° / 109°    ext -5° / -10° 0° / -7°   Strength Knee flex 4+/5 B 5/5 B    ext 4+/5 / 4/5 5/5 B        Co-morbidities/Complexities (which will affect course of rehabilitation):   []None        Arthritic conditions   []Rheumatoid arthritis (M05.9)  [x]Osteoarthritis (M19.91) Cardiovascular conditions   [x]Hypertension (I10)  []Hyperlipidemia (E78.5)  []Angina pectoris (I20)  []Atherosclerosis (I70) Musculoskeletal conditions   []Disc pathology   []Congenital spine pathologies   []Prior surgical intervention  []Osteoporosis (M81.8)  []Osteopenia (M85.8)   Endocrine conditions   []Hypothyroid (E03.9)  []Hyperthyroid Gastrointestinal conditions   []Constipation (X77.88) Metabolic conditions   []Morbid obesity (E66.01)  [x]Diabetes type 1(E10.65) or 2 (E11.65)   []Neuropathy (G60.9)   Pulmonary conditions   []Asthma (J45)  []Coughing   []COPD (J44.9) Psychological Disorders  []Anxiety (F41.9)  []Depression (F32.9)   []Other: []Other:           Treatment to date:  [x] Therapeutic Exercise    [] Modalities:  [] Therapeutic Activity             []Ultrasound            []Electrical Stimulation  [] Gait Training     []Cervical Traction    [] Lumbar Traction  [] Neuromuscular Re-education [] Cold/hotpack         []Iontophoresis  [x] Instruction in HEP

## 2019-07-11 ENCOUNTER — APPOINTMENT (OUTPATIENT)
Dept: PHYSICAL THERAPY | Age: 69
End: 2019-07-11
Payer: MEDICARE

## 2019-07-15 ENCOUNTER — APPOINTMENT (OUTPATIENT)
Dept: PHYSICAL THERAPY | Age: 69
End: 2019-07-15
Payer: MEDICARE

## 2019-07-18 ENCOUNTER — APPOINTMENT (OUTPATIENT)
Dept: PHYSICAL THERAPY | Age: 69
End: 2019-07-18
Payer: MEDICARE

## 2019-08-18 DIAGNOSIS — I10 ESSENTIAL HYPERTENSION: ICD-10-CM

## 2019-08-18 DIAGNOSIS — E78.2 MIXED HYPERLIPIDEMIA: ICD-10-CM

## 2019-08-19 RX ORDER — LISINOPRIL 20 MG/1
TABLET ORAL
Qty: 90 TABLET | Refills: 1 | Status: SHIPPED | OUTPATIENT
Start: 2019-08-19 | End: 2020-02-04

## 2019-08-19 RX ORDER — LOVASTATIN 40 MG/1
TABLET ORAL
Qty: 90 TABLET | Refills: 1 | Status: SHIPPED | OUTPATIENT
Start: 2019-08-19 | End: 2020-02-04

## 2019-09-16 ENCOUNTER — OFFICE VISIT (OUTPATIENT)
Dept: PRIMARY CARE CLINIC | Age: 69
End: 2019-09-16
Payer: MEDICARE

## 2019-09-16 VITALS
HEART RATE: 54 BPM | WEIGHT: 222.2 LBS | DIASTOLIC BLOOD PRESSURE: 70 MMHG | BODY MASS INDEX: 37.02 KG/M2 | TEMPERATURE: 97.9 F | SYSTOLIC BLOOD PRESSURE: 138 MMHG | RESPIRATION RATE: 16 BRPM | OXYGEN SATURATION: 98 % | HEIGHT: 65 IN

## 2019-09-16 DIAGNOSIS — Z12.5 SCREENING PSA (PROSTATE SPECIFIC ANTIGEN): ICD-10-CM

## 2019-09-16 DIAGNOSIS — E55.9 VITAMIN D DEFICIENCY: ICD-10-CM

## 2019-09-16 DIAGNOSIS — E78.2 MIXED HYPERLIPIDEMIA: ICD-10-CM

## 2019-09-16 DIAGNOSIS — E11.8 TYPE 2 DIABETES MELLITUS WITH COMPLICATION, WITHOUT LONG-TERM CURRENT USE OF INSULIN (HCC): ICD-10-CM

## 2019-09-16 DIAGNOSIS — Z23 NEED FOR INFLUENZA VACCINATION: ICD-10-CM

## 2019-09-16 DIAGNOSIS — I10 ESSENTIAL HYPERTENSION: ICD-10-CM

## 2019-09-16 DIAGNOSIS — D50.9 IRON DEFICIENCY ANEMIA, UNSPECIFIED IRON DEFICIENCY ANEMIA TYPE: ICD-10-CM

## 2019-09-16 DIAGNOSIS — Z00.00 ANNUAL PHYSICAL EXAM: Primary | ICD-10-CM

## 2019-09-16 DIAGNOSIS — Z00.00 ANNUAL PHYSICAL EXAM: ICD-10-CM

## 2019-09-16 DIAGNOSIS — M17.0 PRIMARY OSTEOARTHRITIS OF BOTH KNEES: ICD-10-CM

## 2019-09-16 DIAGNOSIS — R80.9 MICROALBUMINURIA: ICD-10-CM

## 2019-09-16 DIAGNOSIS — K21.9 GASTROESOPHAGEAL REFLUX DISEASE, ESOPHAGITIS PRESENCE NOT SPECIFIED: ICD-10-CM

## 2019-09-16 DIAGNOSIS — Z23 NEED FOR SHINGLES VACCINE: ICD-10-CM

## 2019-09-16 LAB
A/G RATIO: 1.6 (ref 1.1–2.2)
ALBUMIN SERPL-MCNC: 4.6 G/DL (ref 3.4–5)
ALP BLD-CCNC: 53 U/L (ref 40–129)
ALT SERPL-CCNC: 36 U/L (ref 10–40)
ANION GAP SERPL CALCULATED.3IONS-SCNC: 15 MMOL/L (ref 3–16)
AST SERPL-CCNC: 23 U/L (ref 15–37)
BASOPHILS ABSOLUTE: 0 K/UL (ref 0–0.2)
BASOPHILS RELATIVE PERCENT: 0.4 %
BILIRUB SERPL-MCNC: <0.2 MG/DL (ref 0–1)
BUN BLDV-MCNC: 9 MG/DL (ref 7–20)
CALCIUM SERPL-MCNC: 9.9 MG/DL (ref 8.3–10.6)
CHLORIDE BLD-SCNC: 101 MMOL/L (ref 99–110)
CHOLESTEROL, TOTAL: 153 MG/DL (ref 0–199)
CO2: 25 MMOL/L (ref 21–32)
CREAT SERPL-MCNC: 0.5 MG/DL (ref 0.8–1.3)
CREATININE URINE: 65.5 MG/DL (ref 39–259)
EOSINOPHILS ABSOLUTE: 0.1 K/UL (ref 0–0.6)
EOSINOPHILS RELATIVE PERCENT: 0.9 %
FERRITIN: 156.3 NG/ML (ref 30–400)
GFR AFRICAN AMERICAN: >60
GFR NON-AFRICAN AMERICAN: >60
GLOBULIN: 2.9 G/DL
GLUCOSE BLD-MCNC: 114 MG/DL (ref 70–99)
HBA1C MFR BLD: 6.6 %
HCT VFR BLD CALC: 36.1 % (ref 40.5–52.5)
HDLC SERPL-MCNC: 47 MG/DL (ref 40–60)
HEMOGLOBIN: 11.8 G/DL (ref 13.5–17.5)
IRON SATURATION: 24 % (ref 20–50)
IRON: 80 UG/DL (ref 59–158)
LDL CHOLESTEROL CALCULATED: 68 MG/DL
LYMPHOCYTES ABSOLUTE: 2.6 K/UL (ref 1–5.1)
LYMPHOCYTES RELATIVE PERCENT: 36.2 %
MCH RBC QN AUTO: 27.8 PG (ref 26–34)
MCHC RBC AUTO-ENTMCNC: 32.7 G/DL (ref 31–36)
MCV RBC AUTO: 85 FL (ref 80–100)
MICROALBUMIN UR-MCNC: 5.8 MG/DL
MICROALBUMIN/CREAT UR-RTO: 88.5 MG/G (ref 0–30)
MONOCYTES ABSOLUTE: 0.5 K/UL (ref 0–1.3)
MONOCYTES RELATIVE PERCENT: 7.3 %
NEUTROPHILS ABSOLUTE: 4 K/UL (ref 1.7–7.7)
NEUTROPHILS RELATIVE PERCENT: 55.2 %
PDW BLD-RTO: 14.2 % (ref 12.4–15.4)
PLATELET # BLD: 265 K/UL (ref 135–450)
PMV BLD AUTO: 8.5 FL (ref 5–10.5)
POTASSIUM SERPL-SCNC: 4.7 MMOL/L (ref 3.5–5.1)
PROSTATE SPECIFIC ANTIGEN: 1.56 NG/ML (ref 0–4)
RBC # BLD: 4.24 M/UL (ref 4.2–5.9)
SODIUM BLD-SCNC: 141 MMOL/L (ref 136–145)
TOTAL IRON BINDING CAPACITY: 335 UG/DL (ref 260–445)
TOTAL PROTEIN: 7.5 G/DL (ref 6.4–8.2)
TRIGL SERPL-MCNC: 189 MG/DL (ref 0–150)
VITAMIN D 25-HYDROXY: 46.3 NG/ML
VLDLC SERPL CALC-MCNC: 38 MG/DL
WBC # BLD: 7.3 K/UL (ref 4–11)

## 2019-09-16 PROCEDURE — G0008 ADMIN INFLUENZA VIRUS VAC: HCPCS | Performed by: INTERNAL MEDICINE

## 2019-09-16 PROCEDURE — 90653 IIV ADJUVANT VACCINE IM: CPT | Performed by: INTERNAL MEDICINE

## 2019-09-16 PROCEDURE — 99397 PER PM REEVAL EST PAT 65+ YR: CPT | Performed by: INTERNAL MEDICINE

## 2019-09-16 PROCEDURE — 83036 HEMOGLOBIN GLYCOSYLATED A1C: CPT | Performed by: INTERNAL MEDICINE

## 2019-09-16 PROCEDURE — 81002 URINALYSIS NONAUTO W/O SCOPE: CPT | Performed by: INTERNAL MEDICINE

## 2019-09-16 ASSESSMENT — ENCOUNTER SYMPTOMS
NAUSEA: 0
EYE DISCHARGE: 0
VOICE CHANGE: 0
ANAL BLEEDING: 0
VOMITING: 0
RHINORRHEA: 0
SHORTNESS OF BREATH: 0
CONSTIPATION: 0
CHOKING: 0
EYE REDNESS: 0
APNEA: 0
SINUS PRESSURE: 0
TROUBLE SWALLOWING: 0
ABDOMINAL PAIN: 0
BACK PAIN: 0
COUGH: 0
WHEEZING: 0
EYE ITCHING: 0
RECTAL PAIN: 0
EYE PAIN: 0
BLOOD IN STOOL: 0
CHEST TIGHTNESS: 0
SORE THROAT: 0
PHOTOPHOBIA: 0
DIARRHEA: 0
FACIAL SWELLING: 0
ABDOMINAL DISTENTION: 0

## 2019-09-16 NOTE — PROGRESS NOTES
swallowing and voice change. Eyes: Negative for photophobia, pain, discharge, redness, itching and visual disturbance. Respiratory: Negative for apnea, cough, choking, chest tightness, shortness of breath and wheezing. Cardiovascular: Negative for chest pain, palpitations and leg swelling. Gastrointestinal: Negative for abdominal distention, abdominal pain, anal bleeding, blood in stool, constipation, diarrhea, nausea, rectal pain and vomiting. Endocrine: Negative for cold intolerance and heat intolerance. Genitourinary: Negative for decreased urine volume, difficulty urinating, discharge, dysuria, flank pain, frequency, genital sores, hematuria, penile pain, penile swelling, scrotal swelling, testicular pain and urgency. Musculoskeletal: Positive for arthralgias and gait problem (if knee pain is flared up). Negative for back pain, joint swelling, myalgias, neck pain and neck stiffness. Skin: Negative for rash. Neurological: Negative for dizziness, tremors, seizures, syncope, facial asymmetry, speech difficulty, weakness, light-headedness, numbness and headaches. Hematological: Negative for adenopathy. Does not bruise/bleed easily. Psychiatric/Behavioral: Negative for decreased concentration, dysphoric mood and sleep disturbance. The patient is not nervous/anxious.         No Known Allergies  Outpatient Medications Marked as Taking for the 9/16/19 encounter (Office Visit) with Ayse Luo MD   Medication Sig Dispense Refill    lisinopril (PRINIVIL;ZESTRIL) 20 MG tablet TAKE 1 TABLET EVERY DAY 90 tablet 1    lovastatin (MEVACOR) 40 MG tablet TAKE 1 TABLET EVERY NIGHT 90 tablet 1    diclofenac sodium 1 % GEL Apply 2 g topically 2 times daily 300 g 1    ferrous sulfate 325 (65 Fe) MG tablet Take 1 tablet by mouth daily (with breakfast)      metFORMIN (GLUCOPHAGE) 500 MG tablet TAKE 1 TABLET TWICE DAILY WITH MEALS 180 tablet 0    Blood Glucose Calibration (ACCU-CHEK SMARTVIEW CONTROL) normal pulses. Exam reveals no gallop and no friction rub. No murmur heard. Pulmonary/Chest: Effort normal and breath sounds normal. No respiratory distress. He has no wheezes. He has no rhonchi. He has no rales. Abdominal: Soft. Normal appearance and bowel sounds are normal. He exhibits no distension. There is no hepatosplenomegaly. There is no tenderness. There is no rebound and no guarding. Hernia confirmed negative in the right inguinal area and confirmed negative in the left inguinal area. Genitourinary: Rectum normal, prostate normal, testes normal and penis normal.   Musculoskeletal: Normal range of motion. He exhibits no edema. Right shoulder: He exhibits normal range of motion and no tenderness. Left shoulder: He exhibits normal range of motion and no tenderness. Right elbow: He exhibits normal range of motion and no swelling. No tenderness found. Left elbow: He exhibits normal range of motion and no swelling. No tenderness found. Right wrist: He exhibits no tenderness and no swelling. Left wrist: He exhibits no tenderness and no swelling. Right hip: He exhibits normal range of motion and no tenderness. Left hip: He exhibits normal range of motion and no tenderness. Right knee: He exhibits normal range of motion and no swelling. Tenderness found. Left knee: He exhibits normal range of motion and no swelling. Tenderness found. Right ankle: He exhibits normal range of motion and no swelling. No tenderness. Left ankle: He exhibits normal range of motion and no swelling. No tenderness. Cervical back: He exhibits normal range of motion, no tenderness and no spasm. Thoracic back: He exhibits no tenderness and no spasm. Lumbar back: He exhibits normal range of motion, no tenderness and no spasm. Right upper arm: He exhibits no tenderness. Left upper arm: He exhibits no tenderness. grams with meals and 15 grams with snacks  -monitor blood sugars  -Regular aerobic exercise  - POCT glycosylated hemoglobin (Hb A1C)  - Microalbumin / Creatinine Urine Ratio    3. Essential hypertension  -stable  -Continue same medications  -Low sodium diet  -Regular aerobic exercise  - POCT Urinalysis no Micro    4. Mixed hyperlipidemia  -Continue same medications  -Low fat, low cholesterol diet  -Regular aerobic exercise  - Lipid Panel; Future    5. Gastroesophageal reflux disease, esophagitis presence not specified  -stable  -Continue same medications  -Decrease caffeine, avoid spicy foods, avoid tomato based foods  -Eat small meals instead of large meals  -Wait 2-3 hours after eating before lying down    6. Primary osteoarthritis of both knees  -stable  -Continue same medications    7. Microalbuminuria  -Continue same medications  - Microalbumin / Creatinine Urine Ratio    8. Iron deficiency anemia, unspecified iron deficiency anemia type  -Continue same medications  - iron rich diet  - CBC Auto Differential; Future  - Iron and TIBC; Future  - Ferritin; Future    9. Vitamin D deficiency  -stable  -Continue same medications  - Vitamin D 25 Hydroxy; Future    10. Need for influenza vaccination  - INFLUENZA, TRIV, INACTIVATED, SUBUNIT, ADJUVANTED, 65 YRS AND OLDER, IM, PREFILL SYR, 0.5ML (FLUAD TRIV) given    11. Screening PSA (prostate specific antigen)  - Psa screening; Future    12. Need for shingles vaccine  -Patient given prescription for Shingrix vaccine to have done at their pharmacy  - zoster recombinant adjuvanted vaccine [facility]) 50 MCG/0.5ML SUSR injection; Inject 0.5 mLs into the muscle once for 1 dose  Dispense: 0.5 mL; Refill: 1       Discussed medications with patient, who voiced understanding of their use and indications. All questions answered. Return in about 6 months (around 3/16/2020) for diabetes, hypertension, hyperlipidemia, and chronic medical conditions.

## 2019-09-16 NOTE — PATIENT INSTRUCTIONS
compensation. How can I learn more? · Ask your healthcare provider. He or she can give you the vaccine package insert or suggest other sources of information. · Call your local or state health department. · Contact the Centers for Disease Control and Prevention (CDC):  ? Call 2-378.945.3962 (1-800-CDC-INFO) or  ? Visit CDC's website at www.cdc.gov/flu  Vaccine Information Statement  Inactivated Influenza Vaccine  8/7/2015)  42 FREDERICK [employer] Bird 381PX-17  Department of Health and Human Services  Centers for Disease Control and Prevention  Many Vaccine Information Statements are available in [place] and other languages. See www.immunize.org/vis. Muchas hojas de información sobre vacunas están disponibles en español y en otros idiomas. Visite www.immunize.org/vis. Care instructions adapted under license by [facility] ([facility] [place] [facility]). If you have questions about a medical condition or this instruction, always ask your healthcare professional. [place] [address] any warranty or liability for your use of this information.

## 2019-10-13 DIAGNOSIS — E11.8 TYPE 2 DIABETES MELLITUS WITH COMPLICATION, WITHOUT LONG-TERM CURRENT USE OF INSULIN (HCC): ICD-10-CM

## 2020-02-04 RX ORDER — LISINOPRIL 20 MG/1
TABLET ORAL
Qty: 90 TABLET | Refills: 1 | Status: SHIPPED | OUTPATIENT
Start: 2020-02-04 | End: 2020-07-23

## 2020-02-04 RX ORDER — LOVASTATIN 40 MG/1
TABLET ORAL
Qty: 90 TABLET | Refills: 1 | Status: SHIPPED | OUTPATIENT
Start: 2020-02-04 | End: 2020-07-23

## 2020-02-04 NOTE — TELEPHONE ENCOUNTER
Medication:   Requested Prescriptions     Pending Prescriptions Disp Refills    lovastatin (MEVACOR) 40 MG tablet [Pharmacy Med Name: LOVASTATIN 40 MG Tablet] 90 tablet 1     Sig: TAKE 1 TABLET EVERY NIGHT    lisinopril (PRINIVIL;ZESTRIL) 20 MG tablet [Pharmacy Med Name: LISINOPRIL 20 MG Tablet] 90 tablet 1     Sig: TAKE 1 TABLET EVERY DAY    metFORMIN (GLUCOPHAGE) 500 MG tablet [Pharmacy Med Name: METFORMIN HYDROCHLORIDE 500 MG Tablet] 180 tablet 1     Sig: TAKE 1 TABLET TWICE DAILY WITH MEALS       Last Filled:  11/21/2019    Patient Phone Number: [phone] (home)     Last appt: 9/16/2019   Next appt: 3/17/2020    Last BMP:   Lab Results   Component Value Date     09/16/2019    K 4.7 09/16/2019     09/16/2019    CO2 25 09/16/2019    ANIONGAP 15 09/16/2019    GLUCOSE 114 09/16/2019    BUN 9 09/16/2019    CREATININE 0.5 09/16/2019    LABGLOM >60 09/16/2019    GFRAA >60 09/16/2019    GFRAA >60 05/03/2013    CALCIUM 9.9 09/16/2019      Last CMP:   Lab Results   Component Value Date     09/16/2019    K 4.7 09/16/2019     09/16/2019    CO2 25 09/16/2019    ANIONGAP 15 09/16/2019    GLUCOSE 114 09/16/2019    BUN 9 09/16/2019    CREATININE 0.5 09/16/2019    LABGLOM >60 09/16/2019    GFRAA >60 09/16/2019    GFRAA >60 05/03/2013    PROT 7.5 09/16/2019    LABALBU 4.6 09/16/2019    AGRATIO 1.6 09/16/2019    BILITOT <0.2 09/16/2019    ALKPHOS 53 09/16/2019    ALT 36 09/16/2019    AST 23 09/16/2019    GLOB 2.9 09/16/2019     Last Renal Function:   Lab Results   Component Value Date     09/16/2019    K 4.7 09/16/2019     09/16/2019    CO2 25 09/16/2019    GLUCOSE 114 09/16/2019    BUN 9 09/16/2019    CREATININE 0.5 09/16/2019    LABALBU 4.6 09/16/2019    CALCIUM 9.9 09/16/2019    GFR >60 05/03/2013    GFRAA >60 09/16/2019    GFRAA >60 05/03/2013       Last OARRS: No flowsheet data found.     Preferred Pharmacy:   [address] [place] [address] [address] [facility] [address]  Phone: [phone] Fax: [phone]    Medication:   Requested Prescriptions     Pending Prescriptions Disp Refills    lovastatin (MEVACOR) 40 MG tablet [Pharmacy Med Name: LOVASTATIN 40 MG Tablet] 90 tablet 1     Sig: TAKE 1 TABLET EVERY NIGHT    lisinopril (PRINIVIL;ZESTRIL) 20 MG tablet [Pharmacy Med Name: LISINOPRIL 20 MG Tablet] 90 tablet 1     Sig: TAKE 1 TABLET EVERY DAY    metFORMIN (GLUCOPHAGE) 500 MG tablet [Pharmacy Med Name: METFORMIN HYDROCHLORIDE 500 MG Tablet] 180 tablet 1     Sig: TAKE 1 TABLET TWICE DAILY WITH MEALS     Last Filled:  11/21/2019    Last appt: 9/16/2019   Next appt: 3/17/2020    Last Lipid:   Lab Results   Component Value Date    CHOL 153 09/16/2019    TRIG 189 09/16/2019    HDL 47 09/16/2019    LDLCALC 68 09/16/2019       Medication:   Requested Prescriptions     Pending Prescriptions Disp Refills    lovastatin (MEVACOR) 40 MG tablet [Pharmacy Med Name: LOVASTATIN 40 MG Tablet] 90 tablet 1     Sig: TAKE 1 TABLET EVERY NIGHT    lisinopril (PRINIVIL;ZESTRIL) 20 MG tablet [Pharmacy Med Name: LISINOPRIL 20 MG Tablet] 90 tablet 1     Sig: TAKE 1 TABLET EVERY DAY    metFORMIN (GLUCOPHAGE) 500 MG tablet [Pharmacy Med Name: Willa Balder 500 MG Tablet] 180 tablet 1     Sig: TAKE 1 TABLET TWICE DAILY WITH MEALS       Last Filled:  12/21/2019    Patient Phone Number: [phone] (home)     Last appt: 9/16/2019   Next appt: 3/17/2020    Last Labs DM:   Lab Results   Component Value Date    LABA1C 6.6 09/16/2019       Last OARRS: No flowsheet data found.     [phone] [phone]  [address] [place] [id number]  Phone: [phone] Fax: [phone]

## 2020-03-02 RX ORDER — BLOOD SUGAR DIAGNOSTIC
STRIP MISCELLANEOUS
Qty: 100 STRIP | Refills: 3 | Status: SHIPPED | OUTPATIENT
Start: 2020-03-02 | End: 2020-03-17 | Stop reason: SDUPTHER

## 2020-03-02 NOTE — TELEPHONE ENCOUNTER
Medication:   Requested Prescriptions     Pending Prescriptions Disp Refills    ACCU-CHEK SMARTVIEW strip [Pharmacy Med Name: Sandro Banegas   Strip] 100 strip 3     Sig: USE AS DIRECTED EVERY DAY AS NEEDED       Last Filled:      Patient Phone Number: [phone] (home)     Last appt: 9/16/2019   Next appt: 3/17/2020    Last Labs DM:   Lab Results   Component Value Date    LABA1C 6.6 09/16/2019       Last OARRS: No flowsheet data found.     [facility] Pharm[facility]   [phone] - F [phone]  [address] [place] [address]  Phone: [phone] Fax: [phone]

## 2020-03-17 ENCOUNTER — OFFICE VISIT (OUTPATIENT)
Dept: PRIMARY CARE CLINIC | Age: 70
End: 2020-03-17
Payer: MEDICARE

## 2020-03-17 VITALS
DIASTOLIC BLOOD PRESSURE: 80 MMHG | HEART RATE: 74 BPM | OXYGEN SATURATION: 96 % | SYSTOLIC BLOOD PRESSURE: 130 MMHG | TEMPERATURE: 98.3 F | BODY MASS INDEX: 36.82 KG/M2 | RESPIRATION RATE: 14 BRPM | HEIGHT: 65 IN | WEIGHT: 221 LBS

## 2020-03-17 DIAGNOSIS — E55.9 VITAMIN D DEFICIENCY: ICD-10-CM

## 2020-03-17 DIAGNOSIS — D50.9 IRON DEFICIENCY ANEMIA, UNSPECIFIED IRON DEFICIENCY ANEMIA TYPE: ICD-10-CM

## 2020-03-17 DIAGNOSIS — E11.8 TYPE 2 DIABETES MELLITUS WITH COMPLICATION, WITHOUT LONG-TERM CURRENT USE OF INSULIN (HCC): ICD-10-CM

## 2020-03-17 DIAGNOSIS — E78.2 MIXED HYPERLIPIDEMIA: ICD-10-CM

## 2020-03-17 DIAGNOSIS — I10 ESSENTIAL HYPERTENSION: ICD-10-CM

## 2020-03-17 LAB
A/G RATIO: 1.3 (ref 1.1–2.2)
ALBUMIN SERPL-MCNC: 4.8 G/DL (ref 3.4–5)
ALP BLD-CCNC: 57 U/L (ref 40–129)
ALT SERPL-CCNC: 60 U/L (ref 10–40)
ANION GAP SERPL CALCULATED.3IONS-SCNC: 17 MMOL/L (ref 3–16)
AST SERPL-CCNC: 44 U/L (ref 15–37)
BILIRUB SERPL-MCNC: 0.3 MG/DL (ref 0–1)
BUN BLDV-MCNC: 12 MG/DL (ref 7–20)
CALCIUM SERPL-MCNC: 10.5 MG/DL (ref 8.3–10.6)
CHLORIDE BLD-SCNC: 98 MMOL/L (ref 99–110)
CHOLESTEROL, TOTAL: 152 MG/DL (ref 0–199)
CO2: 24 MMOL/L (ref 21–32)
CREAT SERPL-MCNC: 0.5 MG/DL (ref 0.8–1.3)
GFR AFRICAN AMERICAN: >60
GFR NON-AFRICAN AMERICAN: >60
GLOBULIN: 3.6 G/DL
GLUCOSE BLD-MCNC: 124 MG/DL (ref 70–99)
HCT VFR BLD CALC: 39.2 % (ref 40.5–52.5)
HDLC SERPL-MCNC: 45 MG/DL (ref 40–60)
HEMOGLOBIN: 12.9 G/DL (ref 13.5–17.5)
IRON SATURATION: 17 % (ref 20–50)
IRON: 62 UG/DL (ref 59–158)
LDL CHOLESTEROL CALCULATED: 70 MG/DL
MCH RBC QN AUTO: 28.2 PG (ref 26–34)
MCHC RBC AUTO-ENTMCNC: 33 G/DL (ref 31–36)
MCV RBC AUTO: 85.4 FL (ref 80–100)
PDW BLD-RTO: 14 % (ref 12.4–15.4)
PLATELET # BLD: 294 K/UL (ref 135–450)
PMV BLD AUTO: 8.7 FL (ref 5–10.5)
POTASSIUM SERPL-SCNC: 5.1 MMOL/L (ref 3.5–5.1)
RBC # BLD: 4.59 M/UL (ref 4.2–5.9)
SODIUM BLD-SCNC: 139 MMOL/L (ref 136–145)
TOTAL IRON BINDING CAPACITY: 355 UG/DL (ref 260–445)
TOTAL PROTEIN: 8.4 G/DL (ref 6.4–8.2)
TRIGL SERPL-MCNC: 183 MG/DL (ref 0–150)
VITAMIN D 25-HYDROXY: 40.4 NG/ML
VLDLC SERPL CALC-MCNC: 37 MG/DL
WBC # BLD: 10.2 K/UL (ref 4–11)

## 2020-03-17 PROCEDURE — 3017F COLORECTAL CA SCREEN DOC REV: CPT | Performed by: INTERNAL MEDICINE

## 2020-03-17 PROCEDURE — 4040F PNEUMOC VAC/ADMIN/RCVD: CPT | Performed by: INTERNAL MEDICINE

## 2020-03-17 PROCEDURE — G0010 ADMIN HEPATITIS B VACCINE: HCPCS | Performed by: INTERNAL MEDICINE

## 2020-03-17 PROCEDURE — 90746 HEPB VACCINE 3 DOSE ADULT IM: CPT | Performed by: INTERNAL MEDICINE

## 2020-03-17 PROCEDURE — 3046F HEMOGLOBIN A1C LEVEL >9.0%: CPT | Performed by: INTERNAL MEDICINE

## 2020-03-17 PROCEDURE — G8482 FLU IMMUNIZE ORDER/ADMIN: HCPCS | Performed by: INTERNAL MEDICINE

## 2020-03-17 PROCEDURE — 1123F ACP DISCUSS/DSCN MKR DOCD: CPT | Performed by: INTERNAL MEDICINE

## 2020-03-17 PROCEDURE — G0439 PPPS, SUBSEQ VISIT: HCPCS | Performed by: INTERNAL MEDICINE

## 2020-03-17 RX ORDER — BLOOD SUGAR DIAGNOSTIC
1 STRIP MISCELLANEOUS DAILY
Qty: 100 STRIP | Refills: 3 | Status: SHIPPED | OUTPATIENT
Start: 2020-03-17 | End: 2020-09-17 | Stop reason: SDUPTHER

## 2020-03-17 RX ORDER — ZOSTER VACCINE RECOMBINANT, ADJUVANTED 50 MCG/0.5
0.5 KIT INTRAMUSCULAR SEE ADMIN INSTRUCTIONS
Qty: 0.5 ML | Refills: 1 | Status: SHIPPED | OUTPATIENT
Start: 2020-03-17 | End: 2020-09-13

## 2020-03-17 ASSESSMENT — LIFESTYLE VARIABLES
HOW OFTEN DO YOU HAVE SIX OR MORE DRINKS ON ONE OCCASION: 0
HOW OFTEN DURING THE LAST YEAR HAVE YOU FOUND THAT YOU WERE NOT ABLE TO STOP DRINKING ONCE YOU HAD STARTED: 0
AUDIT-C TOTAL SCORE: 1
HAS A RELATIVE, FRIEND, DOCTOR, OR ANOTHER HEALTH PROFESSIONAL EXPRESSED CONCERN ABOUT YOUR DRINKING OR SUGGESTED YOU CUT DOWN: 0
HOW OFTEN DURING THE LAST YEAR HAVE YOU FAILED TO DO WHAT WAS NORMALLY EXPECTED FROM YOU BECAUSE OF DRINKING: 0
HOW OFTEN DURING THE LAST YEAR HAVE YOU HAD A FEELING OF GUILT OR REMORSE AFTER DRINKING: 0
HOW MANY STANDARD DRINKS CONTAINING ALCOHOL DO YOU HAVE ON A TYPICAL DAY: 0
HOW OFTEN DURING THE LAST YEAR HAVE YOU NEEDED AN ALCOHOLIC DRINK FIRST THING IN THE MORNING TO GET YOURSELF GOING AFTER A NIGHT OF HEAVY DRINKING: 0
HOW OFTEN DURING THE LAST YEAR HAVE YOU BEEN UNABLE TO REMEMBER WHAT HAPPENED THE NIGHT BEFORE BECAUSE YOU HAD BEEN DRINKING: 0
HAVE YOU OR SOMEONE ELSE BEEN INJURED AS A RESULT OF YOUR DRINKING: 0
AUDIT TOTAL SCORE: 1
HOW OFTEN DO YOU HAVE A DRINK CONTAINING ALCOHOL: 1

## 2020-03-17 ASSESSMENT — PATIENT HEALTH QUESTIONNAIRE - PHQ9
SUM OF ALL RESPONSES TO PHQ QUESTIONS 1-9: 0
SUM OF ALL RESPONSES TO PHQ QUESTIONS 1-9: 0

## 2020-03-17 NOTE — PROGRESS NOTES
PHQ Scores 3/17/2020 5/15/2019 2/22/2018 8/12/2016 6/3/2016 8/28/2015 8/18/2014   PHQ2 Score 0 0 0 0 0 0 0   PHQ9 Score 0 0 0 0 0 0 0     Interpretation of Total Score Depression Severity: 1-4 = Minimal depression, 5-9 = Mild depression, 10-14 = Moderate depression, 15-19 = Moderately severe depression, 20-27 = Severe depression

## 2020-03-17 NOTE — PATIENT INSTRUCTIONS
Personalized Preventive Plan for Keri Hughes - 3/17/2020  Medicare offers a range of preventive health benefits. Some of the tests and screenings are paid in full while other may be subject to a deductible, co-insurance, and/or copay. Some of these benefits include a comprehensive review of your medical history including lifestyle, illnesses that may run in your family, and various assessments and screenings as appropriate. After reviewing your medical record and screening and assessments performed today your provider may have ordered immunizations, labs, imaging, and/or referrals for you. A list of these orders (if applicable) as well as your Preventive Care list are included within your After Visit Summary for your review. Other Preventive Recommendations:    · A preventive eye exam performed by an eye specialist is recommended every 1-2 years to screen for glaucoma; cataracts, macular degeneration, and other eye disorders. · A preventive dental visit is recommended every 6 months. · Try to get at least 150 minutes of exercise per week or 10,000 steps per day on a pedometer . · Order or download the FREE \"Exercise & Physical Activity: Your Everyday Guide\" from [employer]. Call 1-697.900.3711 or search [employer] online. · You need 2459-1563 mg of calcium and 9109-3204 IU of vitamin D per day. It is possible to meet your calcium requirement with diet alone, but a vitamin D supplement is usually necessary to meet this goal.  · When exposed to the sun, use a sunscreen that protects against both UVA and UVB radiation with an SPF of 30 or greater. Reapply every 2 to 3 hours or after sweating, drying off with a towel, or swimming. · Always wear a seat belt when traveling in a car. Always wear a helmet when riding a bicycle or motorcycle. Heart-Healthy Diet   Sodium, Fat, and Cholesterol Controlled Diet       What Is a Heart Healthy Diet?    A heart-healthy cholesterol, this type of cholesterol actually carries cholesterol away from your arteries and may, therefore, help lower your risk of having a heart attack. You want this level to be high (ideally greater than 60). It is a risk to have a level less than 40. You can raise this good cholesterol by eating olive oil, canola oil, avocados, or nuts. Exercise raises this level, too. Fat    Fat is calorie dense and packs a lot of calories into a small amount of food. Even though fats should be limited due to their high calorie content, not all fats are bad. In fact, some fats are quite healthful. Fat can be broken down into four main types. The good-for-you fats are:   Monounsaturated fat  found in oils such as olive and canola, avocados, and nuts and natural nut butters; can decrease cholesterol levels, while keeping levels of HDL cholesterol high   Polyunsaturated fat  found in oils such as safflower, sunflower, soybean, corn, and sesame; can decrease total cholesterol and LDL cholesterol   Omega-3 fatty acids  particularly those found in fatty fish (such as salmon, trout, tuna, mackerel, herring, and sardines); can decrease risk of arrhythmias, decrease triglyceride levels, and slightly lower blood pressure   The fats that you want to limit are:   Saturated fat  found in animal products, many fast foods, and a few vegetables; increases total blood cholesterol, including LDL levels   Animal fats that are saturated include: butter, lard, whole-milk dairy products, meat fat, and poultry skin   Vegetable fats that are saturated include: hydrogenated shortening, palm oil, coconut oil, cocoa butter   Hydrogenated or trans fat  found in margarine and vegetable shortening, most shelf stable snack foods, and fried foods; increases LDL and decreases HDL     It is generally recommended that you limit your total fat for the day to less than 30% of your total calories.  If you follow an 1800-calorie heart healthy diet, for and cholesterol amounts. For products low in sodium, look for sodium free, very low sodium, low sodium, no added salt, and unsalted   Skip the salt when cooking or at the table; if food needs more flavor, get creative and try out different herbs and spices. Garlic and onion also add substantial flavor to foods. Trim any visible fat off meat and poultry before cooking, and drain the fat off after trevino. Use cooking methods that require little or no added fat, such as grilling, boiling, baking, poaching, broiling, roasting, steaming, stir-frying, and sauting. Avoid fast food and convenience food. They tend to be high in saturated and trans fat and have a lot of added salt. Talk to a registered dietitian for individualized diet advice. Last Reviewed: March 2011 Luci Quintero MS, MPH, RD   Updated: 3/29/2011   ·        Learning About Living Ginny  What is a living will? A living will, also called a declaration, is a legal form. It tells your family and your doctor your wishes when you can't speak for yourself. It's used by the health professionals who will treat you as you near the end of your life or if you get seriously hurt or ill. If you put your wishes in writing, your loved ones and others will know what kind of care you want. They won't need to guess. This can ease your mind and be helpful to others. And you can change or cancel your living will at any time. A living will is not the same as an estate or property will. An estate will explains what you want to happen with your money and property after you die. How do you use it? A living will is used to describe the kinds of treatment or life support you want as you near the end of your life or if you get seriously hurt or ill. Keep these facts in mind about living chi. Your living will is used only if you can't speak or make decisions for yourself.  Most often, one or more doctors must certify that you can't speak or decide for yourself before your living will takes effect. If you get better and can speak for yourself again, you can accept or refuse any treatment. It doesn't matter what you said in your living will. Some states may limit your right to refuse treatment in certain cases. For example, you may need to clearly state in your living will that you don't want artificial hydration and nutrition, such as being fed through a tube. Is a living will a legal document? A living will is a legal document. Each state has its own laws about living chi. And a living will may be called something else in your state. Here are some things to know about living chi. You don't need an  to complete a living will. But legal advice can be helpful if your state's laws are unclear. It can also help if your health history is complicated or your family can't agree on what should be in your living will. You can change your living will at any time. Some people find that their wishes about end-of-life care change as their health changes. If you make big changes to your living will, complete a new form. If you move to another state, make sure that your living will is legal in the state where you now live. In most cases, doctors will respect your wishes even if you have a form from a different state. You might use a universal form that has been approved by many states. This kind of form can sometimes be filled out and stored online. Your digital copy will then be available wherever you have a connection to the internet. The doctors and nurses who need to treat you can find it right away. Your state may offer an online registry. This is another place where you can store your living will online. It's a good idea to get your living will notarized. This means using a person called a  to watch two people sign, or witness, your living will. What should you know when you create a living will?   Here are some questions to ask yourself as you make your living will:  Do you know enough about life support methods that might be used? If not, talk to your doctor so you know what might be done if you can't breathe on your own, your heart stops, or you can't swallow. What things would you still want to be able to do after you receive life-support methods? Would you want to be able to walk? To speak? To eat on your own? To live without the help of machines? Do you want certain Nondenominational practices performed if you become very ill? If you have a choice, where do you want to be cared for? In your home? At a hospital or nursing home? If you have a choice at the end of your life, where would you prefer to die? At home? In a hospital or nursing home? Somewhere else? Would you prefer to be buried or cremated? Do you want your organs to be donated after you die? What should you do with your living will? Make sure that your family members and your health care agent have copies of your living will (also called a declaration). Give your doctor a copy of your living will. Ask him or her to keep it as part of your medical record. If you have more than one doctor, make sure that each one has a copy. Put a copy of your living will where it can be easily found. For example, some people may put a copy on their refrigerator door. If you are using a digital copy, be sure your doctor, family members, and health care agent know how to find and access it. Where can you learn more? Go to [email or web address]://[email or web address] org and sign in to your [employer] account. Enter V003 in the Gladitood box to learn more about \"Learning About Living [place]roy. \"     If you do not have an account, please click on the \"Sign Up Now\" link. Current as of: December 8, 2019Content Version: 12.4  © 4569-5641 Healthwise, Incorporated. Care instructions adapted under license by [facility] ([facility]).  If you have questions about a medical condition or this instruction, always ask your hepatitis B  · Sexually active persons who are not in a long-term monogamous relationship  · Persons seeking evaluation or treatment for a sexually transmitted disease  · Men who have sexual contact with other men  · People who share needles, syringes, or other drug-injection equipment  · People who have household contact with someone infected with the hepatitis B virus  · Health care and public safety workers at risk for exposure to blood or body fluids  · Residents and staff of facilities for developmentally disabled persons  · Persons in correctional facilities  · Victims of sexual assault or abuse  · Travelers to regions with increased rates of hepatitis B  · People with chronic liver disease, kidney disease, HIV infection, infection with hepatitis C, or diabetes  · Anyone who wants to be protected from hepatitis B  Hepatitis B vaccine may be given at the same time as other vaccines. Talk with your health care provider  Tell your vaccine provider if the person getting the vaccine:  · Has had an allergic reaction after a previous dose of hepatitis B vaccine, or has any severe, life-threatening allergies. In some cases, your health care provider may decide to postpone hepatitis B vaccination to a future visit. People with minor illnesses, such as a cold, may be vaccinated. People who are moderately or severely ill should usually wait until they recover before getting hepatitis B vaccine. Your health care provider can give you more information. Risks of a vaccine reaction  · Soreness where the shot is given or fever can happen after hepatitis B vaccine. People sometimes faint after medical procedures, including vaccination. Tell your provider if you feel dizzy or have vision changes or ringing in the ears. As with any medicine, there is a very remote chance of a vaccine causing a severe allergic reaction, other serious injury, or death. What if there is a serious problem?   An allergic reaction could occur after the vaccinated person leaves the clinic. If you see signs of a severe allergic reaction (hives, swelling of the face and throat, difficulty breathing, a fast heartbeat, dizziness, or weakness), call 9-1-1 and get the person to the nearest hospital.  For other signs that concern you, call your health care provider. Adverse reactions should be reported to the Vaccine Adverse Event Reporting System (VAERS). Your health care provider will usually file this report, or you can do it yourself. Visit the VAERS website at www.vaers. hhs.gov or call 0-829.308.5052. VAERS is only for reporting reactions, and VAERS staff do not give medical advice. The National Vaccine Injury Compensation Program  The National Vaccine Injury Compensation Program (VICP) is a federal program that was created to compensate people who may have been injured by certain vaccines. Visit the VICP website at www.hrsa.gov/vaccinecompensation or call 2-524.379.3841 to learn about the program and about filing a claim. There is a time limit to file a claim for compensation. How can I learn more? · Ask your healthcare provider. · Call your local or state health department. · Contact the Centers for Disease Control and Prevention (CDC):  ? Call 4-277.173.7562 (1-800-CDC-INFO) or  ? Visit CDC's website at www.cdc.gov/vaccines. Vaccine Information Statement (Interim)  Hepatitis B Vaccine  8/15/2019  42 U. S.C. § 300aa-26  U. S. Department of Health and Human Services  Centers for Disease Control and Prevention  Many Vaccine Information Statements are available in Turkmen and other languages. See www.immunize.org/vis. Hojas de información sobre vacunas están disponibles en español y en otros idiomas. Visite www.immunize.org/vis. Care instructions adapted under license by [facility] ([facility]).  If you have questions about a medical condition or this instruction, always ask your healthcare professional. [employer] disclaims any warranty or liability for your use of this information.

## 2020-03-17 NOTE — PROGRESS NOTES
Medicare Annual Wellness Visit  Name: Anand Quintana Date: 3/17/2020   MRN: [id number] Sex: Male   Age: 78 y.o. Ethnicity: Non-/Non    : 1950 Race: Angelica Reynoso is here for Medicare AWV  Pt is also here for follow up diabetes, hypertension, hyperlipidemia, GERD, iron deficiency, and vitamin D deficiency. Diabetes Mellitus Type 2:   Current Medications: Metformin 500mg po bid  Diet: low carbohydrates  Home blood sugar records: Pt is not testing his blood sugar  Any episodes of hypoglycemia? no  Eye exam current (within one year): yes Spring 2019 per patient  Current exercise: walks  0355-2297 steps per day     Hypertension:    Current Medications: Lisinopril 20mg po q day  Home blood pressure monitoring: Yes - and it averages 130/80  Diet:low salt    Hyperlipidemia:    Current medications: Lovastatin 40mg po qhs  Patient denies side effects    Diet: low fat, low cholesterol   Current exercise: walks  1181-4906 steps per day    GERD :   Current Medication: Omeprazole 40mg po q day  Pt has heartburn and reflux once sometimes but not too bad. Pt states he only notices if he lays down too soon after eating. Pt decreases spicy foods and tomato based foods. Pt drinks 3-4 cups of coffee per day    Iron deficiency anemia:  Pt takes Ferrous Sulfate 325mg po q day  Pt eats red meat and green leafy vegetables     Vitamin D deficiency:   Pt takes vitamin D 5,000 IU once daily. Osteoarthritis both knees:  Current Medications: Glucosamine chondroitin and Tylenol  Pt has bilateral knee pain. Knee pain is achy. Pt denies knee swelling. Screenings for behavioral, psychosocial and functional/safety risks, and cognitive dysfunction are all negative except as indicated below. These results, as well as other patient data from the [address] form, are documented in Flowsheets linked to this Encounter.     No Known Allergies      Prior to Visit Medications    Medication Sig Taking? Authorizing Provider   zoster recombinant adjuvanted vaccine (SHINGRIX) 50 MCG/0.5ML SUSR injection Inject 0.5 mLs into the muscle See Admin Instructions 1 dose now and repeat in 2-6 months Yes Gauri Mccall MD   blood glucose test strips (ACCU-CHEK SMARTVIEW) strip 1 each by Does not apply route daily Yes Gauri Mccall MD   lovastatin (MEVACOR) 40 MG tablet TAKE 1 TABLET EVERY NIGHT Yes Gauri Mccall MD   lisinopril (PRINIVIL;ZESTRIL) 20 MG tablet TAKE 1 TABLET EVERY DAY Yes Gauri Mccall MD   metFORMIN (GLUCOPHAGE) 500 MG tablet TAKE 1 TABLET TWICE DAILY WITH MEALS Yes Gauri Mccall MD   diclofenac sodium 1 % GEL Apply 2 g topically 2 times daily Yes Gauri Mccall MD   ferrous sulfate 325 (65 Fe) MG tablet Take 1 tablet by mouth daily (with breakfast) Yes Gauri Mccall MD   Blood Glucose Calibration (ACCU-CHEK SMARTVIEW CONTROL) LIQD Use as directed Yes Gauri Mccall MD   ACCU-CHEK FASTCLIX LANCETS MISC 1 each by Does not apply route daily Yes Gauri Mccall MD   magnesium oxide (MAG-OX) 400 MG tablet Take 400 mg by mouth daily Yes Historical Provider, MD   Alcohol Swabs (B-D SINGLE USE SWABS REGULAR) PADS USE AS DIRECTED Yes Gauri Mccall MD   HOMEOPATHIC PRODUCTS EX Apply topically Indications: Gold Bond Diabetic Cream Yes Historical Provider, MD   Blood Glucose Monitoring Suppl (ACCU-CHEK SOTERO SMARTVIEW) W/DEVICE KIT Use as directed Yes Gauri Mccall MD   acetaminophen (TYLENOL) 325 MG tablet Take 650 mg by mouth every 6 hours as needed for Pain. Yes Historical Provider, MD   vitamin D-3 (CHOLECALCIFEROL) 5000 UNITS TABS Take 1 tablet by mouth daily. Yes Gauri Mccall MD   omeprazole (PRILOSEC) 40 MG capsule Take 40 mg by mouth daily. Yes Historical Provider, MD   Glucosamine 500 MG CAPS Take  by mouth daily. Yes Historical Provider, MD   Multiple Vitamins-Minerals (MULTIVITAMIN PO) Take  by mouth.  Yes Historical Provider, MD         Past Habits/Nutrition  Do you exercise for at least 20 minutes 2-3 times per week?: Yes  Have you lost any weight without trying in the past 3 months?: No  Do you eat fewer than 2 meals per day?: No  Have you seen a dentist within the past year?: (!) No  Body mass index is 36.78 kg/m².   Health Habits/Nutrition Interventions:  · Dental exam overdue:  patient declines dental evaluation due to wearing upper and lower dentures    Personalized Preventive Plan   Current Health Maintenance Status  Immunization History   Administered Date(s) Administered    Hepatitis B Adult (Engerix-B) 03/17/2020    Influenza 10/08/2013    Influenza Virus Vaccine 10/08/2013, 10/18/2014    Influenza, High Dose (Fluzone 65 yrs and older) 12/01/2015, 11/14/2016, 09/07/2017, 09/18/2018    Influenza, Triv, inactivated, subunit, adjuvanted, IM (Fluad 65 yrs and older) 09/16/2019    Pneumococcal Conjugate 13-valent ([id number]) 12/01/2015    Pneumococcal Polysaccharide ([id number]) 08/18/2014    Tdap (Boostrix, Adacel) 05/03/2013    Zoster Live (Zostavax) 05/09/2013        Health Maintenance   Topic Date Due    Hepatitis B vaccine (2 of 3 - CpG risk 3-dose series) 07/03/1969    Shingles Vaccine (2 of 3) 07/04/2013    Diabetic retinal exam  11/27/2018    Annual Wellness Visit (AWV)  05/29/2019    A1C test (Diabetic or Prediabetic)  09/16/2020    Diabetic microalbuminuria test  09/16/2020    Lipid screen  09/16/2020    Potassium monitoring  09/16/2020    Creatinine monitoring  09/16/2020    Colon cancer screen colonoscopy  03/07/2021    Diabetic foot exam  03/17/2021    DTaP/Tdap/Td vaccine (2 - Td) 05/03/2023    Flu vaccine  Completed    Pneumococcal 65+ years Vaccine  Completed    AAA screen  Completed    Hepatitis C screen  Completed    Hepatitis A vaccine  Aged Out    Hib vaccine  Aged Out    Meningococcal (ACWY) vaccine  Aged Out     Recommendations for [employer] Due: see orders and patient instructions/AVS.  . Recommended screening schedule for the next 5-10 years is provided to the patient in written form: see Patient Instructions/AVS.    Nadia Abdalla was seen today for medicare aw. Diagnoses and all orders for this visit:    Routine general medical examination at a health care facility  - Medicare AWV done    Type 2 diabetes mellitus with complication, without long-term current use of insulin ([address]  -Continue same medications  -Limit carbohydrates to 45 grams with meals and 15 grams with snacks  -monitor blood sugars  -Regular aerobic exercise  -     Comprehensive Metabolic Panel; Future  -     Hemoglobin A1C; Future  - DIABETES FOOT EXAM    Essential hypertension  -stable  -Continue same medications  -Low sodium diet  -Regular aerobic exercise  -     Comprehensive Metabolic Panel; Future    Mixed hyperlipidemia  -Continue same medications  -Low fat, low cholesterol diet  -Regular aerobic exercise  -     Comprehensive Metabolic Panel; Future  -     Lipid Panel; Future    Gastroesophageal reflux disease, esophagitis presence not specified  -stable  -Continue same medications  -Decrease caffeine, avoid spicy foods, avoid tomato based foods  -Eat small meals instead of large meals  -Wait 2-3 hours after eating before lying down    Iron deficiency anemia, unspecified iron deficiency anemia type  -stable  -Continue same medications  -iron rich diet  -     Iron and TIBC; Future  -     CBC; Future    Vitamin D deficiency  -stable  -Continue same medications  -     Vitamin D 25 Hydroxy; Future    Primary osteoarthritis of both knees  -stable  -Continue same medications    Need for shingles vaccine  -Patient given prescription for Shingrix vaccine to have done at their pharmacy  -     zoster recombinant adjuvanted vaccine [facility]) 50 MCG/0.5ML SUSR injection;  Inject 0.5 mLs into the muscle See Admin Instructions 1 dose now and repeat in 2-6 months    Need for hepatitis B vaccination  -Hepatitis B vaccine

## 2020-03-18 LAB
ESTIMATED AVERAGE GLUCOSE: 148.5 MG/DL
HBA1C MFR BLD: 6.8 %

## 2020-04-17 ENCOUNTER — NURSE ONLY (OUTPATIENT)
Dept: PRIMARY CARE CLINIC | Age: 70
End: 2020-04-17
Payer: MEDICARE

## 2020-04-17 PROCEDURE — 90746 HEPB VACCINE 3 DOSE ADULT IM: CPT | Performed by: INTERNAL MEDICINE

## 2020-04-17 PROCEDURE — G0010 ADMIN HEPATITIS B VACCINE: HCPCS | Performed by: INTERNAL MEDICINE

## 2020-07-23 RX ORDER — LISINOPRIL 20 MG/1
TABLET ORAL
Qty: 90 TABLET | Refills: 0 | Status: SHIPPED | OUTPATIENT
Start: 2020-07-23 | End: 2020-10-22

## 2020-07-23 RX ORDER — LOVASTATIN 40 MG/1
TABLET ORAL
Qty: 90 TABLET | Refills: 0 | Status: SHIPPED | OUTPATIENT
Start: 2020-07-23 | End: 2020-10-22

## 2020-09-17 ENCOUNTER — OFFICE VISIT (OUTPATIENT)
Dept: PRIMARY CARE CLINIC | Age: 70
End: 2020-09-17
Payer: MEDICARE

## 2020-09-17 VITALS
RESPIRATION RATE: 18 BRPM | WEIGHT: 222.2 LBS | SYSTOLIC BLOOD PRESSURE: 138 MMHG | HEART RATE: 77 BPM | BODY MASS INDEX: 36.98 KG/M2 | OXYGEN SATURATION: 97 % | DIASTOLIC BLOOD PRESSURE: 84 MMHG | TEMPERATURE: 97.8 F

## 2020-09-17 DIAGNOSIS — E11.8 TYPE 2 DIABETES MELLITUS WITH COMPLICATION, WITHOUT LONG-TERM CURRENT USE OF INSULIN (HCC): ICD-10-CM

## 2020-09-17 DIAGNOSIS — D50.9 IRON DEFICIENCY ANEMIA, UNSPECIFIED IRON DEFICIENCY ANEMIA TYPE: ICD-10-CM

## 2020-09-17 DIAGNOSIS — E55.9 VITAMIN D DEFICIENCY: ICD-10-CM

## 2020-09-17 DIAGNOSIS — R74.8 ELEVATED LIVER ENZYMES: ICD-10-CM

## 2020-09-17 DIAGNOSIS — I10 ESSENTIAL HYPERTENSION: ICD-10-CM

## 2020-09-17 DIAGNOSIS — T14.8XXA BRUISE: ICD-10-CM

## 2020-09-17 DIAGNOSIS — E78.2 MIXED HYPERLIPIDEMIA: ICD-10-CM

## 2020-09-17 PROBLEM — D49.2 SKIN GROWTH: Status: ACTIVE | Noted: 2020-09-17

## 2020-09-17 PROBLEM — E66.01 MORBIDLY OBESE (HCC): Status: ACTIVE | Noted: 2020-09-17

## 2020-09-17 LAB
A/G RATIO: 1.4 (ref 1.1–2.2)
ALBUMIN SERPL-MCNC: 4.6 G/DL (ref 3.4–5)
ALP BLD-CCNC: 47 U/L (ref 40–129)
ALT SERPL-CCNC: 45 U/L (ref 10–40)
ANION GAP SERPL CALCULATED.3IONS-SCNC: 12 MMOL/L (ref 3–16)
AST SERPL-CCNC: 31 U/L (ref 15–37)
BILIRUB SERPL-MCNC: 0.4 MG/DL (ref 0–1)
BUN BLDV-MCNC: 12 MG/DL (ref 7–20)
CALCIUM SERPL-MCNC: 10.2 MG/DL (ref 8.3–10.6)
CHLORIDE BLD-SCNC: 96 MMOL/L (ref 99–110)
CHOLESTEROL, TOTAL: 141 MG/DL (ref 0–199)
CO2: 26 MMOL/L (ref 21–32)
CREAT SERPL-MCNC: 0.6 MG/DL (ref 0.8–1.3)
CREATININE URINE: 205.8 MG/DL (ref 39–259)
FERRITIN: 170.1 NG/ML (ref 30–400)
GFR AFRICAN AMERICAN: >60
GFR NON-AFRICAN AMERICAN: >60
GLOBULIN: 3.4 G/DL
GLUCOSE BLD-MCNC: 142 MG/DL (ref 70–99)
HBA1C MFR BLD: 7.2 %
HCT VFR BLD CALC: 36.8 % (ref 40.5–52.5)
HDLC SERPL-MCNC: 46 MG/DL (ref 40–60)
HEMOGLOBIN: 12.1 G/DL (ref 13.5–17.5)
IRON SATURATION: 23 % (ref 20–50)
IRON: 77 UG/DL (ref 59–158)
LDL CHOLESTEROL CALCULATED: 60 MG/DL
MCH RBC QN AUTO: 27.8 PG (ref 26–34)
MCHC RBC AUTO-ENTMCNC: 32.9 G/DL (ref 31–36)
MCV RBC AUTO: 84.5 FL (ref 80–100)
MICROALBUMIN UR-MCNC: 75.9 MG/DL
MICROALBUMIN/CREAT UR-RTO: 368.8 MG/G (ref 0–30)
PDW BLD-RTO: 14.1 % (ref 12.4–15.4)
PLATELET # BLD: 289 K/UL (ref 135–450)
PMV BLD AUTO: 8.6 FL (ref 5–10.5)
POTASSIUM SERPL-SCNC: 4.3 MMOL/L (ref 3.5–5.1)
RBC # BLD: 4.35 M/UL (ref 4.2–5.9)
SODIUM BLD-SCNC: 134 MMOL/L (ref 136–145)
TOTAL IRON BINDING CAPACITY: 337 UG/DL (ref 260–445)
TOTAL PROTEIN: 8 G/DL (ref 6.4–8.2)
TRIGL SERPL-MCNC: 175 MG/DL (ref 0–150)
VITAMIN D 25-HYDROXY: 50.6 NG/ML
VLDLC SERPL CALC-MCNC: 35 MG/DL
WBC # BLD: 9.5 K/UL (ref 4–11)

## 2020-09-17 PROCEDURE — 2022F DILAT RTA XM EVC RTNOPTHY: CPT | Performed by: INTERNAL MEDICINE

## 2020-09-17 PROCEDURE — G8427 DOCREV CUR MEDS BY ELIG CLIN: HCPCS | Performed by: INTERNAL MEDICINE

## 2020-09-17 PROCEDURE — 3017F COLORECTAL CA SCREEN DOC REV: CPT | Performed by: INTERNAL MEDICINE

## 2020-09-17 PROCEDURE — 1036F TOBACCO NON-USER: CPT | Performed by: INTERNAL MEDICINE

## 2020-09-17 PROCEDURE — G0010 ADMIN HEPATITIS B VACCINE: HCPCS | Performed by: INTERNAL MEDICINE

## 2020-09-17 PROCEDURE — 90746 HEPB VACCINE 3 DOSE ADULT IM: CPT | Performed by: INTERNAL MEDICINE

## 2020-09-17 PROCEDURE — 3051F HG A1C>EQUAL 7.0%<8.0%: CPT | Performed by: INTERNAL MEDICINE

## 2020-09-17 PROCEDURE — 99214 OFFICE O/P EST MOD 30 MIN: CPT | Performed by: INTERNAL MEDICINE

## 2020-09-17 PROCEDURE — 83036 HEMOGLOBIN GLYCOSYLATED A1C: CPT | Performed by: INTERNAL MEDICINE

## 2020-09-17 PROCEDURE — G8417 CALC BMI ABV UP PARAM F/U: HCPCS | Performed by: INTERNAL MEDICINE

## 2020-09-17 PROCEDURE — 4040F PNEUMOC VAC/ADMIN/RCVD: CPT | Performed by: INTERNAL MEDICINE

## 2020-09-17 PROCEDURE — 1123F ACP DISCUSS/DSCN MKR DOCD: CPT | Performed by: INTERNAL MEDICINE

## 2020-09-17 PROCEDURE — G0008 ADMIN INFLUENZA VIRUS VAC: HCPCS | Performed by: INTERNAL MEDICINE

## 2020-09-17 PROCEDURE — 90694 VACC AIIV4 NO PRSRV 0.5ML IM: CPT | Performed by: INTERNAL MEDICINE

## 2020-09-17 RX ORDER — BLOOD SUGAR DIAGNOSTIC
1 STRIP MISCELLANEOUS DAILY
Qty: 100 STRIP | Refills: 3 | Status: SHIPPED | OUTPATIENT
Start: 2020-09-17 | End: 2021-09-13

## 2020-09-17 SDOH — ECONOMIC STABILITY: INCOME INSECURITY: HOW HARD IS IT FOR YOU TO PAY FOR THE VERY BASICS LIKE FOOD, HOUSING, MEDICAL CARE, AND HEATING?: NOT HARD AT ALL

## 2020-09-17 SDOH — ECONOMIC STABILITY: FOOD INSECURITY: WITHIN THE PAST 12 MONTHS, THE FOOD YOU BOUGHT JUST DIDN'T LAST AND YOU DIDN'T HAVE MONEY TO GET MORE.: NEVER TRUE

## 2020-09-17 SDOH — ECONOMIC STABILITY: FOOD INSECURITY: WITHIN THE PAST 12 MONTHS, YOU WORRIED THAT YOUR FOOD WOULD RUN OUT BEFORE YOU GOT MONEY TO BUY MORE.: NEVER TRUE

## 2020-09-17 SDOH — ECONOMIC STABILITY: TRANSPORTATION INSECURITY
IN THE PAST 12 MONTHS, HAS THE LACK OF TRANSPORTATION KEPT YOU FROM MEDICAL APPOINTMENTS OR FROM GETTING MEDICATIONS?: NO

## 2020-09-17 SDOH — ECONOMIC STABILITY: TRANSPORTATION INSECURITY
IN THE PAST 12 MONTHS, HAS LACK OF TRANSPORTATION KEPT YOU FROM MEETINGS, WORK, OR FROM GETTING THINGS NEEDED FOR DAILY LIVING?: NO

## 2020-09-17 ASSESSMENT — ENCOUNTER SYMPTOMS
NAUSEA: 0
BLOOD IN STOOL: 0
CONSTIPATION: 0
VOMITING: 0
WHEEZING: 0
RHINORRHEA: 0
DIARRHEA: 0
ABDOMINAL PAIN: 0
SHORTNESS OF BREATH: 0
CHEST TIGHTNESS: 0
TROUBLE SWALLOWING: 0
SORE THROAT: 0
COUGH: 0

## 2020-09-17 NOTE — PROGRESS NOTES
Florecita Hill   Date ofBirth:  1950    Date of Visit:  9/17/2020    Chief Complaint   Patient presents with    Diabetes    Hypertension    Hyperlipidemia    Gastroesophageal Reflux    Anemia    Arthritis       HPI  Diabetes Mellitus Type 2:   Current Medications: Metformin 500mg po bid  Diet: low carbohydrates  Home blood sugar records: Pt tests once daily fasting and it averages 100-130  Any episodes of hypoglycemia? no  Eye exam current (within one year): 2019 per patient  Current exercise: walks 4,000 steps per day     Hypertension:    Current Medications: Lisinopril 20mg po q day  Home blood pressure monitoring: Yes - and it averages 120-130/70-80  Diet:low salt     Hyperlipidemia:    Current medications: Lovastatin 40mg po qhs  Diet: low fat, low cholesterol   Current exercise: walks 4000 steps per day     GERD :   Current Medication: Omeprazole 40mg po q day  Pt states he has heartburn and reflux once in awhile  Pt decreases spicy foods and tomato based foods. Pt drinks 2-3 cups of coffee per day     Iron deficiency anemia:  Pt takes Ferrous Sulfate 325mg po q day  Pt eats red meat and green leafy vegetables     Vitamin D deficiency:   Pt takes vitamin D 5,000 IU po q day.     Osteoarthritis both knees:  Current Medications: Glucosamine chondroitin and Tylenol. Pt uses Diclofenac gel as needed. Pt has bilateral knee pain. Knee pain is dull achy and getting worse per patient. Pt states his left knee is bigger.     Morbid obesity ([address]):  Discussed and patient would like help with his diet    Bruising:  Pt states he notices bruising palm of left hand on 9/15/20. Pt denies injury. Pt denies taking aspirin, fish oil, or NSAID's. Pt denies pain. Skin growth:  Pt states he has skin growth 6-12 months on his left arm. Pt states it was white and he scraped the other day and it bled. Pt denies change in size. Pt denies pain.      Review of Systems   Constitutional: Negative for activity change, chills, fatigue and fever. HENT: Negative for congestion, postnasal drip, rhinorrhea, sore throat and trouble swallowing. Eyes: Negative for visual disturbance. Respiratory: Negative for cough, chest tightness, shortness of breath and wheezing. Cardiovascular: Negative for chest pain, palpitations and leg swelling. Gastrointestinal: Negative for abdominal pain, blood in stool, constipation, diarrhea, nausea and vomiting. Genitourinary: Negative for dysuria, frequency and hematuria. Musculoskeletal: Positive for arthralgias and joint swelling. Negative for gait problem and myalgias. Skin: Negative for wound. Neurological: Negative for dizziness, syncope, light-headedness, numbness and headaches. Psychiatric/Behavioral: Negative for dysphoric mood and sleep disturbance. The patient is not nervous/anxious.         No Known Allergies  Outpatient Medications Marked as Taking for the 9/17/20 encounter (Office Visit) with Shon Conway MD   Medication Sig Dispense Refill    lovastatin (MEVACOR) 40 MG tablet TAKE 1 TABLET EVERY NIGHT 90 tablet 0    lisinopril (PRINIVIL;ZESTRIL) 20 MG tablet TAKE 1 TABLET EVERY DAY 90 tablet 0    blood glucose test strips (ACCU-CHEK SMARTVIEW) strip 1 each by Does not apply route daily 100 strip 3    metFORMIN (GLUCOPHAGE) 500 MG tablet TAKE 1 TABLET TWICE DAILY WITH MEALS 180 tablet 1    diclofenac sodium 1 % GEL Apply 2 g topically 2 times daily 300 g 1    ferrous sulfate 325 (65 Fe) MG tablet Take 1 tablet by mouth daily (with breakfast)      Blood Glucose Calibration (ACCU-CHEK SMARTVIEW CONTROL) LIQD Use as directed 1 each 3    ACCU-CHEK FASTCLIX LANCETS MISC 1 each by Does not apply route daily 100 each 3    magnesium oxide (MAG-OX) 400 MG tablet Take 400 mg by mouth daily      Alcohol Swabs (B-D SINGLE USE SWABS REGULAR) PADS USE AS DIRECTED 200 each 3    HOMEOPATHIC PRODUCTS EX Apply topically Indications: Gold Bond Diabetic Cream      Blood Glucose Monitoring Suppl (ACCU-CHEK SOTERO SMARTVIEW) W/DEVICE KIT Use as directed 1 kit 0    acetaminophen (TYLENOL) 325 MG tablet Take 650 mg by mouth every 6 hours as needed for Pain.  vitamin D-3 (CHOLECALCIFEROL) 5000 UNITS TABS Take 1 tablet by mouth daily. 30 tablet 5    omeprazole (PRILOSEC) 40 MG capsule Take 40 mg by mouth daily.  Glucosamine 500 MG CAPS Take  by mouth daily.  Multiple Vitamins-Minerals (MULTIVITAMIN PO) Take  by mouth. Vitals:    09/17/20 0837 09/17/20 0840   BP: (!) 144/92 138/84   Pulse: 77    Resp: 18    Temp: 97.8 °F (36.6 °C)    SpO2: 97%    Weight: 222 lb 3.2 oz (100.8 kg)      Body mass index is 36.98 kg/m². Physical Exam  Nursing note reviewed. Constitutional:       General: He is not in acute distress. Appearance: Normal appearance. He is well-developed. HENT:      Mouth/Throat:      Pharynx: Oropharynx is clear. Eyes:      General: Lids are normal.      Extraocular Movements: Extraocular movements intact. Conjunctiva/sclera: Conjunctivae normal.      Pupils: Pupils are equal, round, and reactive to light. Neck:      Musculoskeletal: Neck supple. Thyroid: No thyromegaly. Vascular: No carotid bruit. Cardiovascular:      Rate and Rhythm: Normal rate and regular rhythm. Heart sounds: Normal heart sounds, S1 normal and S2 normal. No murmur. No friction rub. No gallop. Pulmonary:      Effort: Pulmonary effort is normal. No respiratory distress. Breath sounds: Normal breath sounds. No wheezing, rhonchi or rales. Abdominal:      General: Bowel sounds are normal. There is no distension. Palpations: Abdomen is soft. Tenderness: There is no abdominal tenderness. Musculoskeletal:      Right knee: He exhibits no swelling. Tenderness found. Left knee: He exhibits no swelling. Tenderness found. Right lower leg: No edema. Left lower leg: No edema.    Lymphadenopathy:      Head:      Right side of head: No submandibular adenopathy. Left side of head: No submandibular adenopathy. Skin:     Findings: Bruising and lesion present. Comments: 1 cm x 1 cm round skin growth on left mid forearm with white hard area coming out of the flatter base, nontender, rough and dry    6 cm x 7 cm bruise purplish green and yellow palm of left hand   Psychiatric:         Mood and Affect: Mood normal.           Results for POC orders placed in visit on 09/17/20   POCT glycosylated hemoglobin (Hb A1C)   Result Value Ref Range    Hemoglobin A1C 7.2 %     Lab Review   No visits with results within 6 Month(s) from this visit. Latest known visit with results is:   Orders Only on 03/17/2020   Component Date Value    WBC 03/17/2020 10.2     RBC 03/17/2020 4.59     Hemoglobin 03/17/2020 12.9*    Hematocrit 03/17/2020 39.2*    MCV 03/17/2020 85.4     MCH 03/17/2020 28.2     MCHC 03/17/2020 33.0     RDW 03/17/2020 14.0     Platelets 67/89/5784 294     MPV 03/17/2020 8.7     Iron 03/17/2020 62     TIBC 03/17/2020 355     Iron Saturation 03/17/2020 17*    Vit D, 25-Hydroxy 03/17/2020 40.4     Cholesterol, Total 03/17/2020 152     Triglycerides 03/17/2020 183*    HDL 03/17/2020 45     LDL Calculated 03/17/2020 70     VLDL Cholesterol Calcula* 03/17/2020 37     Hemoglobin A1C 03/17/2020 6.8     eAG 03/17/2020 148. 5     Sodium 03/17/2020 139     Potassium 03/17/2020 5.1     Chloride 03/17/2020 98*    CO2 03/17/2020 24     Anion Gap 03/17/2020 17*    Glucose 03/17/2020 124*    BUN 03/17/2020 12     CREATININE 03/17/2020 0.5*    GFR Non- 03/17/2020 >60     GFR  03/17/2020 >60     Calcium 03/17/2020 10.5     Total Protein 03/17/2020 8.4*    Alb 03/17/2020 4.8     Albumin/Globulin Ratio 03/17/2020 1.3     Total Bilirubin 03/17/2020 0.3     Alkaline Phosphatase 03/17/2020 57     ALT 03/17/2020 60*    AST 03/17/2020 44*    Globulin 03/17/2020 3.6          Assessment/Plan 1. Type 2 diabetes mellitus with complication, without long-term current use of insulin ([facility])  - Hemoglobin A1c of 7.2% shows diabetes needs a little better control  - POCT glycosylated hemoglobin (Hb A1C)  - MICROALBUMIN / CREATININE URINE RATIO; Future  - Comprehensive Metabolic Panel; Future  - Increase metFORMIN (GLUCOPHAGE) 850 MG tablet; Take 1 tablet by mouth 2 times daily (with meals)  Dispense: 180 tablet; Refill: 1  - blood glucose test strips (ACCU-CHEK SMARTVIEW) strip; 1 each by Does not apply route daily  Dispense: 100 strip; Refill: 3  -Limit carbohydrates to 45 grams with meals and 15 grams with snacks  -monitor blood sugars  -Regular aerobic exercise  - Have a diabetic eye exam done    2. Essential hypertension  -stable  -Continue same medications  -Low sodium diet  -Regular aerobic exercise  -Comprehensive Metabolic Panel; Future    3. Mixed hyperlipidemia  -Continue same medications  -Low fat, low cholesterol diet  -Regular aerobic exercise  - Comprehensive Metabolic Panel; Future  - Lipid Panel; Future    4. Gastroesophageal reflux disease, esophagitis presence not specified  -stable  -Continue same medications  -Decrease caffeine, avoid spicy foods, avoid tomato based foods  -Eat small meals instead of large meals  -Wait 2-3 hours after eating before lying down    5. Iron deficiency anemia, unspecified iron deficiency anemia type  -stable  -Continue same medications  - Iron and TIBC; Future  - CBC; Future  - Ferritin; Future    6. Vitamin D deficiency  -stable  -Continue same medications  - Vitamin D 25 Hydroxy; Future    7. Primary osteoarthritis of both knees  -stable  -Continue same medications  - diclofenac sodium (VOLTAREN) 1 % GEL; Apply 2 g topically 2 times daily  Dispense: 300 g; Refill: 1    8. Elevated liver enzymes  - Comprehensive Metabolic Panel; Future    9.  Morbidly obese ([address]  - discussed  - low carbohydrate diet for weight loss  -increase regular exercise to 30 minutes 3-4

## 2020-09-17 NOTE — PATIENT INSTRUCTIONS
days per week    10. Need for hepatitis B vaccination  - Hep B Vaccine Adult (ENGERIX-B) given    11. Need for influenza vaccination  - INFLUENZA, QUADV, ADJUVANTED, 65 YRS =, IM, PF, PREFILL SYR, 0.5ML (FLUAD) given    12. Skin growth  - Referral to [facility] GELA MCGINNIS [facility], [facility]    13. Bruise  - CBC; Future        Patient Education        Hepatitis B Vaccine: What You Need to Know  Why get vaccinated? Hepatitis B vaccine can prevent hepatitis B. Hepatitis B is a liver disease that can cause mild illness lasting a few weeks, or it can lead to a serious, lifelong illness. · Acute hepatitis B infection is a short-term illness that can lead to fever, fatigue, loss of appetite, nausea, vomiting, jaundice (yellow skin or eyes, dark urine, lokesh-colored bowel movements), and pain in the muscles, joints, and stomach. · Chronic hepatitis B infection is a long-term illness that occurs when the hepatitis B virus remains in a person's body. Most people who go on to develop chronic hepatitis B do not have symptoms, but it is still very serious and can lead to liver damage (cirrhosis), liver cancer, and death. Chronically-infected people can spread hepatitis B virus to others, even if they do not feel or look sick themselves. Hepatitis B is spread when blood, semen, or other body fluid infected with the hepatitis B virus enters the body of a person who is not infected. People can become infected through:  · Birth (if a mother has hepatitis B, her baby can become infected)  · Sharing items such as razors or toothbrushes with an infected person  · Contact with the blood or open sores of an infected person  · Sex with an infected partner  · Sharing needles, syringes, or other drug-injection equipment  · Exposure to blood from needlesticks or other sharp instruments  Most people who are vaccinated with hepatitis B vaccine are immune for life.   Hepatitis B vaccine  Hepatitis B vaccine is usually given as 2, 3, or 4 shots. Infants should get their first dose of hepatitis B vaccine at birth and will usually complete the series at 10months of age (sometimes it will take longer than 6 months to complete the series). Children and adolescents younger than 23years of age who have not yet gotten the vaccine should also be vaccinated. Hepatitis B vaccine is also recommended for certain unvaccinated adults:  · People whose sex partners have hepatitis B  · Sexually active persons who are not in a long-term monogamous relationship  · Persons seeking evaluation or treatment for a sexually transmitted disease  · Men who have sexual contact with other men  · People who share needles, syringes, or other drug-injection equipment  · People who have household contact with someone infected with the hepatitis B virus  · Health care and public safety workers at risk for exposure to blood or body fluids  · Residents and staff of facilities for developmentally disabled persons  · Persons in correctional facilities  · Victims of sexual assault or abuse  · Travelers to regions with increased rates of hepatitis B  · People with chronic liver disease, kidney disease, HIV infection, infection with hepatitis C, or diabetes  · Anyone who wants to be protected from hepatitis B  Hepatitis B vaccine may be given at the same time as other vaccines. Talk with your health care provider  Tell your vaccine provider if the person getting the vaccine:  · Has had an allergic reaction after a previous dose of hepatitis B vaccine, or has any severe, life-threatening allergies. In some cases, your health care provider may decide to postpone hepatitis B vaccination to a future visit. People with minor illnesses, such as a cold, may be vaccinated. People who are moderately or severely ill should usually wait until they recover before getting hepatitis B vaccine. Your health care provider can give you more information.   Risks of a vaccine Prevention  Many Vaccine Information Statements are available in Latvian and other languages. See www.immunize.org/vis. Hojas de información sobre vacunas están disponibles en español y en otros idiomas. Visite www.immunize.org/vis. Care instructions adapted under license by [address] If you have questions about a medical condition or this instruction, always ask your healthcare professional. [place] [address] any warranty or liability for your use of this information. Patient Education        Influenza (Flu) Vaccine: Care Instructions  Your Care Instructions     Influenza (flu) is an infection in the lungs and breathing passages. It is caused by the influenza virus. There are different strains, or types, of the flu virus every year. The flu comes on quickly. It can cause a cough, stuffy nose, fever, chills, tiredness, and aches and pains. These symptoms may last up to 10 days. The flu can make you feel very sick, but most of the time it doesn't lead to other problems. But it can cause serious problems in people who are older or who have a long-term illness, such as heart disease or diabetes. You can help prevent the flu by getting a flu vaccine every year, as soon as it is available. You cannot get the flu from the vaccine. The vaccine prevents most cases of the flu. But even when the vaccine doesn't prevent the flu, it can make symptoms less severe and reduce the chance of problems from the flu. Sometimes, young children and people who have an immune system problem may have a slight fever or muscle aches or pains 6 to 12 hours after getting the shot. These symptoms usually last 1 or 2 days. Follow-up care is a key part of your treatment and safety. Be sure to make and go to all appointments, and call your doctor if you are having problems. It's also a good idea to know your test results and keep a list of the medicines you take. Who should get the flu vaccine?   Everyone age 10 months or older should get a flu vaccine each year. It lowers the chance of getting and spreading the flu. The vaccine is very important for people who are at high risk for getting other health problems from the flu. This includes:  · Anyone 48years of age or older. · People who live in a long-term care center, such as a nursing home. · All children 6 months through 25years of age. · Adults and children 6 months and older who have long-term heart or lung problems, such as asthma. · Adults and children 6 months and older who needed medical care or were in a hospital during the past year because of diabetes, chronic kidney disease, or a weak immune system (including HIV or AIDS). · Women who will be pregnant during the flu season. · People who have any condition that can make it hard to breathe or swallow (such as a brain injury or muscle disorders). · People who can give the flu to others who are at high risk for problems from the flu. This includes all health care workers and close contacts of people age 72 or older. Who should not get the flu vaccine? The person who gives the vaccine may tell you not to get it if you:  · Have a severe allergy to eggs or any part of the vaccine. · Have had a severe reaction to a flu vaccine in the past.  · Have had Guillain-Barré syndrome (GBS). · Are sick with a fever. (Get the vaccine when symptoms are gone.)  How can you care for yourself at home? · If you or your child has a sore arm or a slight fever after the shot, take an over-the-counter pain medicine, such as acetaminophen (Tylenol) or ibuprofen (Advil, Motrin). Read and follow all instructions on the label. Do not give aspirin to anyone younger than 20. It has been linked to Reye syndrome, a serious illness. · Do not take two or more pain medicines at the same time unless the doctor told you to. Many pain medicines have acetaminophen, which is Tylenol. Too much acetaminophen (Tylenol) can be harmful.   When should you call for help? [id number] anytime you think you may need emergency care. For example, call if after getting the flu vaccine:  · You have symptoms of a severe reaction to the flu vaccine. Symptoms of a severe reaction may include:  ? Severe difficulty breathing. ? Sudden raised, red areas (hives) all over your body. ? Severe lightheadedness. Call your doctor now or seek immediate medical care if after getting the flu vaccine:  · You think you are having a reaction to the flu vaccine, such as a new fever. Watch closely for changes in your health, and be sure to contact your doctor if you have any problems. Where can you learn more? Go to https://LightArrowpepiceweb.QUALIA (formerly known as Local[employer]). org and sign in to your [employer] account. Enter A561 in the [employer] box to learn more about \"Influenza (Flu) Vaccine: Care Instructions. \"     If you do not have an account, please click on the \"Sign Up Now\" link. Current as of: December 9, 2019               Content Version: 12.5  © 9809-8541 [employer]. Care instructions adapted under license by [facility] ([facility]). If you have questions about a medical condition or this instruction, always ask your healthcare professional. [place] [address] any warranty or liability for your use of this information. Patient Education        Learning About Low-Carbohydrate Diets  What is a low-carbohydrate diet? A low-carbohydrate (or \"low-carb\") diet limits foods and drinks that have carbohydrates. This includes grains, fruits, milk and yogurt, and starchy vegetables like potatoes, beans, and corn. It also avoids foods and drinks that have added sugar. Instead, low-carb diets include foods that are high in protein and fat. Why might you follow a low-carb diet? Low-carb diets may be used for a variety of reasons, such as for weight loss. People who have diabetes may use a low-carb diet to help manage their blood sugar levels.   What should you butters  · Pistachios  · Pork  · Pumpkin seeds  · Tofu  · Trout  · [place]  · [place] ([facility])  · Walnuts  Vegetables  · Broccoli  · Carrots  · Cauliflower  · Green beans  · Mushrooms  · Peppers  · Salad greens  · Spinach  · Tomatoes  Work with your doctor to find out how much of this nutrient you need. Depending on your health, you may need more or less of it in your diet. Where can you learn more? Go to https://AuditionBoothpepiceweb.[email or web address]. org and sign in to your [employer] account. Enter [phone] in the [employer] box to learn more about \"Learning About Low-Carbohydrate Foods. \"     If you do not have an account, please click on the \"Sign Up Now\" link. Current as of: August 22, 2019               Content Version: 12.5  © 1055-5706 [employer]. Care instructions adapted under license by [facility] ([facility]). If you have questions about a medical condition or this instruction, always ask your healthcare professional. [address] any warranty or liability for your use of this information.

## 2020-10-22 RX ORDER — LISINOPRIL 20 MG/1
TABLET ORAL
Qty: 90 TABLET | Refills: 1 | Status: SHIPPED | OUTPATIENT
Start: 2020-10-22 | End: 2021-03-15

## 2020-10-22 RX ORDER — LOVASTATIN 40 MG/1
TABLET ORAL
Qty: 90 TABLET | Refills: 1 | Status: SHIPPED | OUTPATIENT
Start: 2020-10-22 | End: 2021-03-15

## 2020-10-22 NOTE — TELEPHONE ENCOUNTER
Medication:   Requested Prescriptions     Pending Prescriptions Disp Refills    lisinopril (PRINIVIL;ZESTRIL) 20 MG tablet [Pharmacy Med Name: LISINOPRIL 20 MG Tablet] 90 tablet 0     Sig: TAKE 1 TABLET EVERY DAY    lovastatin (MEVACOR) 40 MG tablet [Pharmacy Med Name: LOVASTATIN 40 MG Tablet] 90 tablet 0     Sig: TAKE 1 TABLET EVERY NIGHT     Last Filled: 7.23.20 7.23.20    Last appt: 9/17/2020   Next appt: 3/18/2021    Last OARRS: No flowsheet data found.

## 2021-03-12 DIAGNOSIS — E78.2 MIXED HYPERLIPIDEMIA: ICD-10-CM

## 2021-03-12 DIAGNOSIS — I10 ESSENTIAL HYPERTENSION: ICD-10-CM

## 2021-03-12 DIAGNOSIS — E11.8 TYPE 2 DIABETES MELLITUS WITH COMPLICATION, WITHOUT LONG-TERM CURRENT USE OF INSULIN (HCC): ICD-10-CM

## 2021-03-13 NOTE — TELEPHONE ENCOUNTER
Medication:   Requested Prescriptions     Pending Prescriptions Disp Refills    lisinopril (PRINIVIL;ZESTRIL) 20 MG tablet [Pharmacy Med Name: LISINOPRIL 20 MG Tablet] 90 tablet 1     Sig: TAKE 1 TABLET EVERY DAY    metFORMIN (GLUCOPHAGE) 850 MG tablet [Pharmacy Med Name: METFORMIN HYDROCHLORIDE 850 MG Tablet] 180 tablet 1     Sig: TAKE 1 TABLET TWICE DAILY WITH MEALS    lovastatin (MEVACOR) 40 MG tablet [Pharmacy Med Name: LOVASTATIN 40 MG Tablet] 90 tablet 1     Sig: TAKE 1 TABLET EVERY NIGHT     Last Filled:  9/17 & 10/22    Last appt: 9/17/2020   Next appt: 3/18/2021    Last Labs DM:   Lab Results   Component Value Date    LABA1C 7.2 09/17/2020     Last Lipid:   Lab Results   Component Value Date    CHOL 141 09/17/2020    TRIG 175 09/17/2020    HDL 46 09/17/2020    LDLCALC 60 09/17/2020     Last PSA:   Lab Results   Component Value Date    PSA 1.56 09/16/2019     Last Thyroid:   Lab Results   Component Value Date    TSH 0.94 09/18/2018

## 2021-03-15 RX ORDER — LISINOPRIL 20 MG/1
TABLET ORAL
Qty: 90 TABLET | Refills: 0 | Status: SHIPPED | OUTPATIENT
Start: 2021-03-15 | End: 2021-06-01

## 2021-03-15 RX ORDER — LOVASTATIN 40 MG/1
TABLET ORAL
Qty: 90 TABLET | Refills: 0 | Status: SHIPPED | OUTPATIENT
Start: 2021-03-15 | End: 2021-06-01

## 2021-04-01 DIAGNOSIS — E11.8 TYPE 2 DIABETES MELLITUS WITH COMPLICATION, WITHOUT LONG-TERM CURRENT USE OF INSULIN (HCC): ICD-10-CM

## 2021-04-01 NOTE — TELEPHONE ENCOUNTER
Medication:   Requested Prescriptions     Pending Prescriptions Disp Refills    metFORMIN (GLUCOPHAGE) 850 MG tablet [Pharmacy Med Name: Suzanne Anderson 850 MG Tablet] 180 tablet 0     Sig: TAKE 1 TABLET TWICE DAILY WITH MEALS       Last Filled:      Patient Phone Number: [phone] (home)     Last appt: 9/17/2020   Next appt: 4/13/2021    Last Labs DM:   Lab Results   Component Value Date    LABA1C 7.2 09/17/2020       Last OARRS: No flowsheet data found.     Preferred Pharmacy:   [facility] [phone] - F [phone]  [address] [place] [id number]  Phone: [phone] Fax: [phone]

## 2021-04-13 ENCOUNTER — OFFICE VISIT (OUTPATIENT)
Dept: PRIMARY CARE CLINIC | Age: 71
End: 2021-04-13
Payer: MEDICARE

## 2021-04-13 VITALS
SYSTOLIC BLOOD PRESSURE: 140 MMHG | OXYGEN SATURATION: 97 % | WEIGHT: 209 LBS | DIASTOLIC BLOOD PRESSURE: 78 MMHG | HEIGHT: 65 IN | BODY MASS INDEX: 34.82 KG/M2 | HEART RATE: 98 BPM | TEMPERATURE: 98 F

## 2021-04-13 DIAGNOSIS — D50.9 IRON DEFICIENCY ANEMIA, UNSPECIFIED IRON DEFICIENCY ANEMIA TYPE: ICD-10-CM

## 2021-04-13 DIAGNOSIS — E78.2 MIXED HYPERLIPIDEMIA: ICD-10-CM

## 2021-04-13 DIAGNOSIS — K63.5 POLYP OF COLON, UNSPECIFIED PART OF COLON, UNSPECIFIED TYPE: ICD-10-CM

## 2021-04-13 DIAGNOSIS — I10 ESSENTIAL HYPERTENSION: ICD-10-CM

## 2021-04-13 DIAGNOSIS — E11.8 TYPE 2 DIABETES MELLITUS WITH COMPLICATION, WITHOUT LONG-TERM CURRENT USE OF INSULIN (HCC): Primary | ICD-10-CM

## 2021-04-13 DIAGNOSIS — E11.8 TYPE 2 DIABETES MELLITUS WITH COMPLICATION, WITHOUT LONG-TERM CURRENT USE OF INSULIN (HCC): ICD-10-CM

## 2021-04-13 DIAGNOSIS — E55.9 VITAMIN D DEFICIENCY: ICD-10-CM

## 2021-04-13 DIAGNOSIS — K21.9 GASTROESOPHAGEAL REFLUX DISEASE, UNSPECIFIED WHETHER ESOPHAGITIS PRESENT: ICD-10-CM

## 2021-04-13 DIAGNOSIS — R80.9 MICROALBUMINURIA: ICD-10-CM

## 2021-04-13 LAB
A/G RATIO: 1.1 (ref 1.1–2.2)
ALBUMIN SERPL-MCNC: 4.3 G/DL (ref 3.4–5)
ALP BLD-CCNC: 48 U/L (ref 40–129)
ALT SERPL-CCNC: 21 U/L (ref 10–40)
ANION GAP SERPL CALCULATED.3IONS-SCNC: 16 MMOL/L (ref 3–16)
AST SERPL-CCNC: 18 U/L (ref 15–37)
BILIRUB SERPL-MCNC: <0.2 MG/DL (ref 0–1)
BUN BLDV-MCNC: 10 MG/DL (ref 7–20)
CALCIUM SERPL-MCNC: 10.1 MG/DL (ref 8.3–10.6)
CHLORIDE BLD-SCNC: 97 MMOL/L (ref 99–110)
CHOLESTEROL, TOTAL: 175 MG/DL (ref 0–199)
CO2: 24 MMOL/L (ref 21–32)
CREAT SERPL-MCNC: 0.7 MG/DL (ref 0.8–1.3)
GFR AFRICAN AMERICAN: >60
GFR NON-AFRICAN AMERICAN: >60
GLOBULIN: 4 G/DL
GLUCOSE BLD-MCNC: 120 MG/DL (ref 70–99)
HBA1C MFR BLD: 7 %
HCT VFR BLD CALC: 34.6 % (ref 40.5–52.5)
HDLC SERPL-MCNC: 43 MG/DL (ref 40–60)
HEMOGLOBIN: 11.3 G/DL (ref 13.5–17.5)
IRON SATURATION: 18 % (ref 20–50)
IRON: 59 UG/DL (ref 59–158)
LDL CHOLESTEROL CALCULATED: 90 MG/DL
MCH RBC QN AUTO: 27.8 PG (ref 26–34)
MCHC RBC AUTO-ENTMCNC: 32.8 G/DL (ref 31–36)
MCV RBC AUTO: 84.8 FL (ref 80–100)
PDW BLD-RTO: 14.5 % (ref 12.4–15.4)
PLATELET # BLD: 629 K/UL (ref 135–450)
PMV BLD AUTO: 7.9 FL (ref 5–10.5)
POTASSIUM SERPL-SCNC: 4.9 MMOL/L (ref 3.5–5.1)
RBC # BLD: 4.08 M/UL (ref 4.2–5.9)
SODIUM BLD-SCNC: 137 MMOL/L (ref 136–145)
TOTAL IRON BINDING CAPACITY: 319 UG/DL (ref 260–445)
TOTAL PROTEIN: 8.3 G/DL (ref 6.4–8.2)
TRIGL SERPL-MCNC: 208 MG/DL (ref 0–150)
VITAMIN D 25-HYDROXY: 42.4 NG/ML
VLDLC SERPL CALC-MCNC: 42 MG/DL
WBC # BLD: 9.2 K/UL (ref 4–11)

## 2021-04-13 PROCEDURE — G8417 CALC BMI ABV UP PARAM F/U: HCPCS | Performed by: INTERNAL MEDICINE

## 2021-04-13 PROCEDURE — 3017F COLORECTAL CA SCREEN DOC REV: CPT | Performed by: INTERNAL MEDICINE

## 2021-04-13 PROCEDURE — G8427 DOCREV CUR MEDS BY ELIG CLIN: HCPCS | Performed by: INTERNAL MEDICINE

## 2021-04-13 PROCEDURE — 4040F PNEUMOC VAC/ADMIN/RCVD: CPT | Performed by: INTERNAL MEDICINE

## 2021-04-13 PROCEDURE — 2022F DILAT RTA XM EVC RTNOPTHY: CPT | Performed by: INTERNAL MEDICINE

## 2021-04-13 PROCEDURE — 99214 OFFICE O/P EST MOD 30 MIN: CPT | Performed by: INTERNAL MEDICINE

## 2021-04-13 PROCEDURE — 3051F HG A1C>EQUAL 7.0%<8.0%: CPT | Performed by: INTERNAL MEDICINE

## 2021-04-13 PROCEDURE — 83036 HEMOGLOBIN GLYCOSYLATED A1C: CPT | Performed by: INTERNAL MEDICINE

## 2021-04-13 PROCEDURE — 1123F ACP DISCUSS/DSCN MKR DOCD: CPT | Performed by: INTERNAL MEDICINE

## 2021-04-13 PROCEDURE — 1036F TOBACCO NON-USER: CPT | Performed by: INTERNAL MEDICINE

## 2021-04-13 ASSESSMENT — ENCOUNTER SYMPTOMS
TROUBLE SWALLOWING: 0
CONSTIPATION: 0
SHORTNESS OF BREATH: 0
RHINORRHEA: 0
NAUSEA: 0
ABDOMINAL PAIN: 0
SINUS PAIN: 0
COUGH: 0
SORE THROAT: 0
BLOOD IN STOOL: 0
DIARRHEA: 0
VOMITING: 0
CHEST TIGHTNESS: 0
WHEEZING: 0
SINUS PRESSURE: 0

## 2021-04-13 ASSESSMENT — PATIENT HEALTH QUESTIONNAIRE - PHQ9
SUM OF ALL RESPONSES TO PHQ QUESTIONS 1-9: 0
SUM OF ALL RESPONSES TO PHQ QUESTIONS 1-9: 0
2. FEELING DOWN, DEPRESSED OR HOPELESS: 0

## 2021-04-13 NOTE — PROGRESS NOTES
Estrada Motley   Date ofBirth:  1950    Date of Visit:  4/13/2021    Chief Complaint   Patient presents with    Diabetes    Hypertension    Hyperlipidemia    Gastroesophageal Reflux    Anemia    Other     vitamin D deficiency       HPI  Diabetes Mellitus Type 2:   Current Medications: Metformin 850mg po bid  Diet: Patient states he tries to do a low carbohydrate diet  Home blood sugar records: Patient tests once daily fasting and it averages 130-140  Any episodes of hypoglycemia? no  Eye exam current (within one year): 1/2021 per patient  Current exercise: walks 30 minutes on a treadmill 3-4 times per week     Hypertension:    Current Medications: Lisinopril 20mg po q day  Home blood pressure monitoring: Yes - and it averages 125/75  Diet:low salt  Current exercise: walks 30 minutes on a treadmill 3-4 times per week     Hyperlipidemia:    Current medications: Lovastatin 40mg po qhs  Side effects: none  Diet: patient states he tries to do a low fat, low cholesterol diet  Current exercise: walks 30 minutes on a treadmill 3-4 times per week    GERD :   Current Medication: Omeprazole 40mg po q day  Patient denies heartburn and reflux. Patient states he doesn't eat spicy food. Patient states he eats ketchup and tomatoes. Patient drinks 2-3 cups of coffee per day.     Iron deficiency anemia:  Current Medication: Ferrous Sulfate 325mg po q day  Patient eats red meat and green leafy vegetables     Vitamin D deficiency:   Current Medication: vitamin D 5,000 IU po q day    Microalbuminuria:  Current Medication: Lisinopril 20mg po q day  Patient is seeing Nephrology      Review of Systems   Constitutional: Negative for activity change, chills, fatigue and fever. HENT: Negative for congestion, postnasal drip, rhinorrhea, sinus pressure, sinus pain, sneezing, sore throat and trouble swallowing. Patient states he was seen at Urgent Care for a cold end of March. Patient states Covid test was negative. Symptoms resolved. Eyes: Negative for visual disturbance. Respiratory: Negative for cough, chest tightness, shortness of breath and wheezing. Cardiovascular: Negative for chest pain, palpitations and leg swelling. Gastrointestinal: Negative for abdominal pain, blood in stool, constipation, diarrhea, nausea and vomiting. Genitourinary: Negative for dysuria, frequency and hematuria. Musculoskeletal: Positive for arthralgias (knee arthritis). Negative for myalgias. Skin: Negative for wound. Neurological: Negative for dizziness, syncope, light-headedness, numbness and headaches. Psychiatric/Behavioral: Negative for dysphoric mood and sleep disturbance. The patient is not nervous/anxious.         No Known Allergies  Outpatient Medications Marked as Taking for the 4/13/21 encounter (Office Visit) with Divina Muhammad MD   Medication Sig Dispense Refill    metFORMIN (GLUCOPHAGE) 850 MG tablet TAKE 1 TABLET TWICE DAILY WITH MEALS 180 tablet 0    lisinopril (PRINIVIL;ZESTRIL) 20 MG tablet TAKE 1 TABLET EVERY DAY 90 tablet 0    lovastatin (MEVACOR) 40 MG tablet TAKE 1 TABLET EVERY NIGHT 90 tablet 0    blood glucose test strips (ACCU-CHEK SMARTVIEW) strip 1 each by Does not apply route daily 100 strip 3    diclofenac sodium (VOLTAREN) 1 % GEL Apply 2 g topically 2 times daily 300 g 1    ferrous sulfate 325 (65 Fe) MG tablet Take 1 tablet by mouth daily (with breakfast)      Blood Glucose Calibration (ACCU-CHEK SMARTVIEW CONTROL) LIQD Use as directed 1 each 3    ACCU-CHEK FASTCLIX LANCETS MISC 1 each by Does not apply route daily 100 each 3    magnesium oxide (MAG-OX) 400 MG tablet Take 400 mg by mouth daily      Alcohol Swabs (B-D SINGLE USE SWABS REGULAR) PADS USE AS DIRECTED 200 each 3    HOMEOPATHIC PRODUCTS EX Apply topically Indications: Gold Bond Diabetic Cream      Blood Glucose Monitoring Suppl (ACCU-CHEK SOTERO SMARTVIEW) W/DEVICE KIT Use as directed 1 kit 0    acetaminophen (TYLENOL) 325 MG tablet Take 650 mg by mouth every 6 hours as needed for Pain.  vitamin D-3 (CHOLECALCIFEROL) 5000 UNITS TABS Take 1 tablet by mouth daily. 30 tablet 5    omeprazole (PRILOSEC) 40 MG capsule Take 40 mg by mouth daily.  Glucosamine 500 MG CAPS Take  by mouth daily.  Multiple Vitamins-Minerals (MULTIVITAMIN PO) Take  by mouth. Vitals:    04/13/21 0936 04/13/21 0941   BP: (!) 140/78 (!) 140/78   Pulse: 98    Temp: 98 °F (36.7 °C)    TempSrc: Oral    SpO2: 97%    Weight: 209 lb (94.8 kg)    Height: 5' 5\" (1.651 m)      Body mass index is 34.78 kg/m². Physical Exam  Nursing note reviewed. Constitutional:       General: He is not in acute distress. Appearance: Normal appearance. He is well-developed. HENT:      Right Ear: Tympanic membrane and ear canal normal.      Left Ear: Tympanic membrane and ear canal normal.      Nose:      Right Sinus: No maxillary sinus tenderness. Left Sinus: No maxillary sinus tenderness. Mouth/Throat:      Pharynx: Oropharynx is clear. Eyes:      General: Lids are normal.      Extraocular Movements: Extraocular movements intact. Conjunctiva/sclera: Conjunctivae normal.      Pupils: Pupils are equal, round, and reactive to light. Neck:      Musculoskeletal: Neck supple. Thyroid: No thyromegaly. Vascular: No carotid bruit. Cardiovascular:      Rate and Rhythm: Normal rate and regular rhythm. Pulses: Normal pulses. Heart sounds: Normal heart sounds, S1 normal and S2 normal. No murmur. No friction rub. No gallop. Pulmonary:      Effort: Pulmonary effort is normal. No respiratory distress. Breath sounds: Normal breath sounds. No wheezing, rhonchi or rales. Abdominal:      General: Bowel sounds are normal. There is no distension. Palpations: Abdomen is soft. Tenderness: There is no abdominal tenderness. Musculoskeletal:      Right lower leg: No edema. Left lower leg: No edema. Lymphadenopathy:      Head:      Right side of head: No submandibular adenopathy. Left side of head: No submandibular adenopathy. Skin:     Comments: No foot ulcers     Neurological:      Mental Status: He is alert. Comments: Bilateral foot monofilament exam normal   Psychiatric:         Mood and Affect: Mood normal.           Results for POC orders placed in visit on 04/13/21   POCT glycosylated hemoglobin (Hb A1C)   Result Value Ref Range    Hemoglobin A1C 7.0 %     Lab Review   Orders Only on 11/18/2020   Component Date Value    PTH 11/18/2020 41.2          Assessment/Plan     1. Type 2 diabetes mellitus with complication, without long-term current use of insulin (HCC)  - stable  - Hemoglobin A1c 7.0%   -Continue same medications  -Limit carbohydrates to 45 grams with meals and 15 grams with snacks  -monitor blood sugars  -goal for blood sugar fasting or pre-meal  is   -goal for blood sugar 2 hours after a meal is less than 180  -goal for blood sugar at bedtime is less than 150  -Regular aerobic exercise  -Comprehensive Metabolic Panel; Future  - DIABETES FOOT EXAM  -POCT glycosylated hemoglobin (Hb A1C)    2. Essential hypertension  -stable  -Continue same medications  -Low sodium diet  -Regular aerobic exercise  -Comprehensive Metabolic Panel; Future    3. Mixed hyperlipidemia  - stable  -Continue same medications  -Low fat, low cholesterol diet  -Regular aerobic exercise  -Comprehensive Metabolic Panel; Future  -Lipid Panel; Future    4. Gastroesophageal reflux disease, unspecified whether esophagitis present  -stable  -patient also has Paredes's Esophagus  -Continue same medications  -Decrease caffeine, avoid spicy foods, avoid tomato based foods  -Eat small meals instead of large meals  -Wait 2-3 hours after eating before lying down   - External Referral To Gastroenterology, Dr. Gina Montaño for EGD for GERD and Paredes's    5.  Iron deficiency anemia, unspecified iron deficiency anemia type  -stable  -Continue same medications  - Iron and TIBC; Future  - CBC; Future    6. Vitamin D deficiency  -stable  -Continue same medications  - Vitamin D 25 Hydroxy; Future    7. Microalbuminuria  -Continue same medications  -Continue care per Nephrology    8. Polyp of colon, unspecified part of colon, unspecified type  - External Referral To Gastroenterology, Dr. Margie Marcial for colonoscopy        Discussed medications with patient, who voiced understanding of their use and indications. All questions answered. Return in about 4 months (around 8/13/2021) for Medicare AWV.

## 2021-04-16 ENCOUNTER — IMMUNIZATION (OUTPATIENT)
Dept: PRIMARY CARE CLINIC | Age: 71
End: 2021-04-16
Payer: MEDICARE

## 2021-04-16 PROBLEM — K63.5 POLYP OF COLON: Status: ACTIVE | Noted: 2021-04-16

## 2021-04-16 PROCEDURE — 0011A COVID-19, MODERNA VACCINE 100MCG/0.5ML DOSE: CPT | Performed by: FAMILY MEDICINE

## 2021-04-16 PROCEDURE — 91301 COVID-19, MODERNA VACCINE 100MCG/0.5ML DOSE: CPT | Performed by: FAMILY MEDICINE

## 2021-05-05 PROBLEM — E11.9 DIABETES MELLITUS TYPE 2 IN NONOBESE (HCC): Status: ACTIVE | Noted: 2017-02-07

## 2021-05-05 PROBLEM — N18.2 CKD (CHRONIC KIDNEY DISEASE), STAGE II: Status: ACTIVE | Noted: 2021-05-05

## 2021-05-14 ENCOUNTER — IMMUNIZATION (OUTPATIENT)
Dept: PRIMARY CARE CLINIC | Age: 71
End: 2021-05-14
Payer: MEDICARE

## 2021-05-14 PROCEDURE — 91301 COVID-19, MODERNA VACCINE 100MCG/0.5ML DOSE: CPT | Performed by: FAMILY MEDICINE

## 2021-05-14 PROCEDURE — 0012A COVID-19, MODERNA VACCINE 100MCG/0.5ML DOSE: CPT | Performed by: FAMILY MEDICINE

## 2021-05-31 DIAGNOSIS — E78.2 MIXED HYPERLIPIDEMIA: ICD-10-CM

## 2021-05-31 DIAGNOSIS — I10 ESSENTIAL HYPERTENSION: ICD-10-CM

## 2021-06-01 RX ORDER — LISINOPRIL 20 MG/1
TABLET ORAL
Qty: 90 TABLET | Refills: 0 | Status: SHIPPED | OUTPATIENT
Start: 2021-06-01 | End: 2021-07-29

## 2021-06-01 RX ORDER — LOVASTATIN 40 MG/1
TABLET ORAL
Qty: 90 TABLET | Refills: 0 | Status: SHIPPED | OUTPATIENT
Start: 2021-06-01 | End: 2021-07-29

## 2021-07-19 ENCOUNTER — HOSPITAL ENCOUNTER (EMERGENCY)
Age: 71
Discharge: HOME | End: 2021-07-19
Attending: EMERGENCY MEDICINE
Payer: MEDICARE

## 2021-07-19 VITALS
HEART RATE: 90 BPM | DIASTOLIC BLOOD PRESSURE: 68 MMHG | OXYGEN SATURATION: 96 % | BODY MASS INDEX: 32.14 KG/M2 | SYSTOLIC BLOOD PRESSURE: 131 MMHG | RESPIRATION RATE: 16 BRPM | TEMPERATURE: 98 F | WEIGHT: 200 LBS | HEIGHT: 66 IN

## 2021-07-19 DIAGNOSIS — R31.0 GROSS HEMATURIA: Primary | ICD-10-CM

## 2021-07-19 LAB
BILIRUBIN URINE: NEGATIVE
BLOOD, URINE: ABNORMAL
CLARITY: ABNORMAL
COLOR: ABNORMAL
COMMENT UA: ABNORMAL
GLUCOSE URINE: >=1000 MG/DL
KETONES, URINE: NEGATIVE MG/DL
LEUKOCYTE ESTERASE, URINE: ABNORMAL
MICROSCOPIC EXAMINATION: YES
NITRITE, URINE: NEGATIVE
PH UA: 6 (ref 5–8)
PROTEIN UA: 100 MG/DL
RBC UA: >100 /HPF (ref 0–4)
SPECIFIC GRAVITY UA: >=1.03 (ref 1–1.03)
URINE TYPE: ABNORMAL
UROBILINOGEN, URINE: 0.2 E.U./DL
WBC UA: ABNORMAL /HPF (ref 0–5)

## 2021-07-19 PROCEDURE — 81001 URINALYSIS AUTO W/SCOPE: CPT

## 2021-07-19 PROCEDURE — 51798 US URINE CAPACITY MEASURE: CPT

## 2021-07-19 PROCEDURE — 87086 URINE CULTURE/COLONY COUNT: CPT

## 2021-07-19 PROCEDURE — 99283 EMERGENCY DEPT VISIT LOW MDM: CPT

## 2021-07-19 ASSESSMENT — ENCOUNTER SYMPTOMS
GASTROINTESTINAL NEGATIVE: 1
BACK PAIN: 0
COUGH: 0
CONSTIPATION: 0
RESPIRATORY NEGATIVE: 1
SORE THROAT: 0
DIARRHEA: 0
EYES NEGATIVE: 1
RHINORRHEA: 0
SHORTNESS OF BREATH: 0
VOMITING: 0
NAUSEA: 0
ABDOMINAL PAIN: 0

## 2021-07-19 NOTE — ED TRIAGE NOTES
Pt arrived to ED with hematuria since 3am. Burning with urination along with urgency when it started but now resolved. Reports UTIs in the past with similar presentation.

## 2021-07-19 NOTE — ED PROVIDER NOTES
[facility]  EMERGENCY DEPARTMENT ENCOUNTER          ATTENDING PHYSICIAN NOTE       Date of evaluation: 7/19/2021    Chief Complaint     Hematuria      History of Present Illness     Barb Kimble is a 66 y.o. male who presents with hematuria. The patient states that he woke at 3:00 this morning with some urinary urgency, and had some burning with urination at that time. Over the next several hours, he experienced urinary frequency and urgency, with some dysuria, then developed hematuria a couple of hours prior to presentation. He currently denies any pain or discomfort. The patient states that he has never had blood in his urine as far as he is aware. He has no benign prostatic hypertrophy to his knowledge. He has had a urinary tract infection in the past he states, but his prominent symptom at that time was a knot-like pain in his lower abdomen. He currently denies any abdominal pain. He denies any back pain. He denies any fevers or chills, nausea or vomiting. Review of Systems     Review of Systems   Constitutional: Negative. Negative for activity change, appetite change, chills, fatigue and fever. HENT: Negative. Negative for congestion, rhinorrhea and sore throat. Eyes: Negative. Negative for visual disturbance. Respiratory: Negative. Negative for cough and shortness of breath. Cardiovascular: Negative. Negative for chest pain. Gastrointestinal: Negative. Negative for abdominal pain, constipation, diarrhea, nausea and vomiting. Genitourinary: Positive for dysuria, frequency, hematuria and urgency. Negative for flank pain, penile pain, scrotal swelling and testicular pain. Musculoskeletal: Negative for back pain. Skin: Negative. Neurological: Negative. Psychiatric/Behavioral: Negative. All other systems reviewed and are negative.       Past Medical, Surgical, Family, and Social History     He has a past medical history of Anemia, Paredes's esophagus, Colon polyps, GERD (gastroesophageal reflux disease), Hyperlipidemia, Hypertension, Iron deficiency anemia, Microalbuminuria, Osteoarthritis, Primary osteoarthritis of both knees, Right inguinal hernia, and Vitamin D deficiency. He has a past surgical history that includes [place] tooth extraction; Tonsillectomy; Colonoscopy (12/19/13); Upper gastrointestinal endoscopy (12/19/13); hernia repair (Right, 2013); other surgical history (Left, 08/03/2015); Colonoscopy (3/7/16); and Upper gastrointestinal endoscopy (3/7/16). His family history includes Cancer (age of onset: 72) in his father; Cancer (age of onset: 76) in his mother. He reports that he quit smoking about 36 years ago. He started smoking about 56 years ago. He has a 40.00 pack-year smoking history. He has never used smokeless tobacco. He reports current alcohol use. He reports that he does not use drugs. Medications     Previous Medications    ACCU-CHEK FASTCLIX LANCETS MISC    1 each by Does not apply route daily    ACETAMINOPHEN (TYLENOL) 325 MG TABLET    Take 650 mg by mouth every 6 hours as needed for Pain. ALCOHOL SWABS (B-D SINGLE USE SWABS REGULAR) PADS    USE AS DIRECTED    BLOOD GLUCOSE CALIBRATION (ACCU-CHEK SMARTVIEW CONTROL) LIQD    Use as directed    BLOOD GLUCOSE MONITORING SUPPL (ACCU-CHEK SOTERO SMARTVIEW) W/DEVICE KIT    Use as directed    BLOOD GLUCOSE TEST STRIPS (ACCU-CHEK SMARTVIEW) STRIP    1 each by Does not apply route daily    DICLOFENAC SODIUM (VOLTAREN) 1 % GEL    Apply 2 g topically 2 times daily    FERROUS SULFATE 325 (65 FE) MG TABLET    Take 1 tablet by mouth daily (with breakfast)    GLUCOSAMINE 500 MG CAPS    Take  by mouth daily.     HOMEOPATHIC PRODUCTS EX    Apply topically Indications: Gold Bond Diabetic Cream    LISINOPRIL (PRINIVIL;ZESTRIL) 20 MG TABLET    TAKE 1 TABLET EVERY DAY    LOVASTATIN (MEVACOR) 40 MG TABLET    TAKE 1 TABLET EVERY NIGHT    MAGNESIUM OXIDE (MAG-OX) 400 MG TABLET    Take 400 mg by mouth daily METFORMIN (GLUCOPHAGE) 850 MG TABLET    TAKE 1 TABLET TWICE DAILY WITH MEALS    MULTIPLE VITAMINS-MINERALS (MULTIVITAMIN PO)    Take  by mouth. OMEPRAZOLE (PRILOSEC) 40 MG CAPSULE    Take 40 mg by mouth daily. VITAMIN D-3 (CHOLECALCIFEROL) 5000 UNITS TABS    Take 1 tablet by mouth daily. Allergies     He has No Known Allergies. Physical Exam     INITIAL VITALS: BP: 131/68, Temp: 98 °F (36.7 °C), Pulse: 90, Resp: 16, SpO2: 96 %     General: Well appearing elderly gentleman. Pleasantly conversational, and in NAD. HEENT: Pupils are equal, round, and reactive to light. Extraocular muscles are intact. Conjunctivae are clear and moist. No redness or drainage from the eyes. No drainage from the nose. The oropharynx appeared to be normal.    Neck: Supple, with full range of motion. Back: No CVA tenderness. Cardiovascular: Normal S1-S2 without murmur rub or gallop. 2+ radial pulses bilaterally. Respiratory: Unlabored breathing with equal chest rise and fall. Lungs are clear to auscultation bilaterally. No adventitious lung sounds heard. Abdomen: Soft and nontender, without guarding or rebound tenderness. No masses or hepatosplenomegaly. Skin: Warm and dry, without rashes or ecchymoses, lacerations or abrasions. Neuro: Alert and oriented x3. No focal neurologic deficits are noted. Extremities: Warm and well-perfused, without clubbing, cyanosis, or edema. The patient moves all extremities equally. Psych: The patient's mood and affect are generally within normal limits for their presentation.       Diagnostic Results       RADIOLOGY:  No orders to display       LABS:   Results for orders placed or performed during the hospital encounter of 07/19/21   Urinalysis, reflex to microscopic (Lab)   Result Value Ref Range    Color, UA RED (A) Straw/Yellow    Clarity, UA TURBID (A) Clear    Glucose, Ur >=1000 (A) Negative mg/dL    Bilirubin Urine Negative Negative    Ketones, Urine Negative Negative mg/dL    Specific Gravity, UA >=1.030 1.005 - 1.030    Blood, Urine LARGE (A) Negative    pH, UA 6.0 5.0 - 8.0    Protein,  (A) Negative mg/dL    Urobilinogen, Urine 0.2 <2.0 E.U./dL    Nitrite, Urine Negative Negative    Leukocyte Esterase, Urine SMALL (A) Negative    Microscopic Examination YES     Urine Type NotGiven    Microscopic Urinalysis   Result Value Ref Range    WBC, UA 0-2 0 - 5 /HPF    RBC, UA >100 (A) 0 - 4 /HPF    Urinalysis Comments see below        RECENT VITALS:  BP: 131/68, Temp: 98 °F (36.7 °C), Pulse: 90, Resp: 16, SpO2: 96 %     Procedures         ED Course     Nursing Notes, Past Medical Hx, Past Surgical Hx, Social Hx, Allergies, and Family Hx were reviewed. The patient was given the following medications:  No orders of the defined types were placed in this encounter. CONSULTS:  None    MEDICAL DECISION MAKING / ASSESSMENT / PLAN     Santiago Ponce is a 66 y.o. male with no prior urologic history, but with somewhat chronic symptoms over the last couple of years consistent with BPH, who presents with some initial urinary urgency and dysuria in the early morning hours this morning, now with gross hematuria. He has no abdominal pain, fevers, flank pain, nausea or vomiting, or any other systemic symptoms. He was able to give a urine sample, and bladder scan thereafter showed minimal residual urine, in the range of 25 cc. Urinalysis shows significant red blood cells, but no findings for infection, although urine culture has been ordered and is pending. At this point in time, the patient was referred for urgent follow-up with urology. He was given return precautions for signs or symptoms of urinary retention, or infectious signs or symptoms or other concerns. Clinical Impression     1.  Gross hematuria        Disposition     PATIENT REFERRED TO:  MD Madhu Alejandra  [facility] [address] [place] [address]  [phone]    Call today  to discuss your ER visit, and arrange a close follow-up appointment      DISCHARGE MEDICATIONS:  New Prescriptions    No medications on file       DISPOSITION Decision To Discharge    (Please note that portions of this note were completed with voice recognition software.   Efforts were made to edit the dictations but occasionally words are mis-transcribed.)     Royal Ballesteros MD  07/19/21 3426

## 2021-07-20 LAB — URINE CULTURE, ROUTINE: NORMAL

## 2021-07-29 DIAGNOSIS — I10 ESSENTIAL HYPERTENSION: ICD-10-CM

## 2021-07-29 DIAGNOSIS — E78.2 MIXED HYPERLIPIDEMIA: ICD-10-CM

## 2021-07-29 RX ORDER — LISINOPRIL 20 MG/1
TABLET ORAL
Qty: 90 TABLET | Refills: 0 | Status: SHIPPED | OUTPATIENT
Start: 2021-07-29 | End: 2021-09-13

## 2021-07-29 RX ORDER — LOVASTATIN 40 MG/1
TABLET ORAL
Qty: 90 TABLET | Refills: 0 | Status: SHIPPED | OUTPATIENT
Start: 2021-07-29 | End: 2021-09-13

## 2021-07-29 NOTE — TELEPHONE ENCOUNTER
Medication:   Requested Prescriptions     Pending Prescriptions Disp Refills    lisinopril (PRINIVIL;ZESTRIL) 20 MG tablet [Pharmacy Med Name: LISINOPRIL 20 MG Tablet] 90 tablet 0     Sig: TAKE 1 TABLET EVERY DAY    lovastatin (MEVACOR) 40 MG tablet [Pharmacy Med Name: LOVASTATIN 40 MG Tablet] 90 tablet 0     Sig: TAKE 1 TABLET EVERY NIGHT       Last Filled:      Patient Phone Number: [phone] (home)     Last appt: 4/13/2021   Next appt: 8/9/2021    Last BMP:   Lab Results   Component Value Date     04/13/2021    K 4.9 04/13/2021    CL 97 04/13/2021    CO2 24 04/13/2021    ANIONGAP 16 04/13/2021    GLUCOSE 120 04/13/2021    BUN 12 07/22/2021    CREATININE 0.5 07/22/2021    LABGLOM >60 04/13/2021    GFRAA >60 04/13/2021    GFRAA >60 05/03/2013    CALCIUM 10.1 04/13/2021      Last CMP:   Lab Results   Component Value Date     04/13/2021    K 4.9 04/13/2021    CL 97 04/13/2021    CO2 24 04/13/2021    ANIONGAP 16 04/13/2021    GLUCOSE 120 04/13/2021    BUN 12 07/22/2021    CREATININE 0.5 07/22/2021    LABGLOM >60 04/13/2021    GFRAA >60 04/13/2021    GFRAA >60 05/03/2013    PROT 8.3 04/13/2021    LABALBU 4.3 04/13/2021    AGRATIO 1.1 04/13/2021    BILITOT <0.2 04/13/2021    ALKPHOS 48 04/13/2021    ALT 21 04/13/2021    AST 18 04/13/2021    GLOB 4.0 04/13/2021     Last Renal Function:   Lab Results   Component Value Date     04/13/2021    K 4.9 04/13/2021    CL 97 04/13/2021    CO2 24 04/13/2021    GLUCOSE 120 04/13/2021    BUN 12 07/22/2021    CREATININE 0.5 07/22/2021    LABALBU 4.3 04/13/2021    CALCIUM 10.1 04/13/2021    GFR >60 05/03/2013    GFRAA >60 04/13/2021    GFRAA >60 05/03/2013       Last OARRS: No flowsheet data found.     Preferred Pharmacy:   [facility] - Khadijah Pleiteznuvia [phone] - F [phone]  [address] [place] [id number]  Phone: [phone] Fax: [phone]

## 2021-08-08 NOTE — PROGRESS NOTES
Preoperative Consultation    Name: Blas Hightower  YOB: 1950    This patient presents to the office today for a preoperative consultation at the request of surgeon, , who plans on performing [facility]. Planned anesthesia: Local   Known anesthesia problems: None   Bleeding risk: No recent or remote history of abnormal bleeding  Personal or FH ofDVT/PE: No      NEPHRO:  Follows with Dr Víctor Shah. No recent changes. DMII:  Controlled on PO meds. Last A1C 7.0  No h/o heart disease. Has been vaccinated for covid.      Patient Active Problem List   Diagnosis    Right inguinal hernia    Anemia    Inguinal hernia    Iron deficiency anemia    GERD (gastroesophageal reflux disease)    Vitamin D deficiency    Diabetes mellitus, type 2 ([address]    Hyperlipidemia    Osteoarthritis    Essential hypertension    Primary osteoarthritis of both knees    Microalbuminuria    Diabetes mellitus type 2 in nonobese (HCC)    Microscopic hematuria    Morbidly obese ([address]    Skin growth    Bruise    Polyp of colon    CKD (chronic kidney disease), stage II     Past Surgical History:   Procedure Laterality Date    COLONOSCOPY  12/19/13    COLONOSCOPY  3/7/16    repeat in 5 years - Sadie DOWELL    HERNIA REPAIR Right 2013    inguinal     OTHER SURGICAL HISTORY Left 08/03/2015    Left Buttock mass exesion      TONSILLECTOMY      UPPER GASTROINTESTINAL ENDOSCOPY  12/19/13    UPPER GASTROINTESTINAL ENDOSCOPY  3/7/16    repeat in 3 yrs - Sadie DOWELL    WISDOM TOOTH EXTRACTION       No Known Allergies  Outpatient Medications Marked as Taking for the 8/9/21 encounter (Office Visit) with RASHEEDA Alfonso - CNP   Medication Sig Dispense Refill    lisinopril (PRINIVIL;ZESTRIL) 20 MG tablet TAKE 1 TABLET EVERY DAY 90 tablet 0    lovastatin (MEVACOR) 40 MG tablet TAKE 1 TABLET EVERY NIGHT 90 tablet 0    metFORMIN (GLUCOPHAGE) 850 MG tablet TAKE 1 TABLET TWICE DAILY WITH MEALS 180 tablet 0    blood glucose test strips (ACCU-CHEK SMARTVIEW) strip 1 each by Does not apply route daily 100 strip 3    diclofenac sodium (VOLTAREN) 1 % GEL Apply 2 g topically 2 times daily 300 g 1    ferrous sulfate 325 (65 Fe) MG tablet Take 1 tablet by mouth daily (with breakfast)      Blood Glucose Calibration (ACCU-CHEK SMARTVIEW CONTROL) LIQD Use as directed 1 each 3    ACCU-CHEK FASTCLIX LANCETS MISC 1 each by Does not apply route daily 100 each 3    magnesium oxide (MAG-OX) 400 MG tablet Take 400 mg by mouth daily      Alcohol Swabs (B-D SINGLE USE SWABS REGULAR) PADS USE AS DIRECTED 200 each 3    HOMEOPATHIC PRODUCTS EX Apply topically Indications: Gold Bond Diabetic Cream      Blood Glucose Monitoring Suppl (ACCU-CHEK SOTERO SMARTVIEW) W/DEVICE KIT Use as directed 1 kit 0    acetaminophen (TYLENOL) 325 MG tablet Take 650 mg by mouth every 6 hours as needed for Pain.  vitamin D-3 (CHOLECALCIFEROL) 5000 UNITS TABS Take 1 tablet by mouth daily. 30 tablet 5    omeprazole (PRILOSEC) 40 MG capsule Take 40 mg by mouth daily.  Glucosamine 500 MG CAPS Take  by mouth daily.  Multiple Vitamins-Minerals (MULTIVITAMIN PO) Take  by mouth. Social History     Tobacco Use    Smoking status: Former Smoker     Packs/day: 2.00     Years: 20.00     Pack years: 40.00     Start date: 1965     Quit date: 1985     Years since quittin.6    Smokeless tobacco: Never Used   Substance Use Topics    Alcohol use: Yes     Comment: less than 1/week     Family History   Problem Relation Age of Onset    Cancer Mother 76        hip tumor    Cancer Father 72        throat    Diabetes Neg Hx     Hypertension Neg Hx     Heart Disease Neg Hx        Review of Systems  Review of Systems   Constitutional: Negative for activity change, appetite change, chills, fatigue and unexpected weight change.    HENT: Negative for congestion, ear pain, hearing loss, nosebleeds, postnasal drip, sneezing and sore throat. Respiratory: Negative for cough, chest tightness, shortness of breath and wheezing. Cardiovascular: Negative for chest pain, palpitations and leg swelling. Gastrointestinal: Negative for abdominal pain, blood in stool, constipation, diarrhea, nausea and vomiting. Genitourinary: Negative for difficulty urinating and dysuria. Musculoskeletal: Negative for arthralgias, back pain and neck pain. Skin: Negative for color change, pallor and rash. Neurological: Negative for dizziness, tremors, numbness and headaches. Hematological: Does not bruise/bleed easily. Psychiatric/Behavioral: Negative for agitation and sleep disturbance. The patient is not nervous/anxious. RECENT LABS:  CBC:   Lab Results   Component Value Date    WBC 7.9 08/09/2021    HGB 11.2 08/09/2021    HCT 33.3 08/09/2021    MCH 28.0 08/09/2021    MCHC 33.5 08/09/2021    RDW 14.1 08/09/2021     08/09/2021    MPV 8.3 08/09/2021     CMP:   Lab Results   Component Value Date     08/09/2021    K 4.4 08/09/2021     08/09/2021    CO2 24 08/09/2021    ANIONGAP 16 08/09/2021    GLUCOSE 102 08/09/2021    BUN 12 08/09/2021    CREATININE 0.7 08/09/2021    GFRAA >60 08/09/2021    GFRAA >60 05/03/2013    CALCIUM 9.8 08/09/2021    PROT 8.3 04/13/2021    LABALBU 4.3 04/13/2021    AGRATIO 1.1 04/13/2021    BILITOT <0.2 04/13/2021    ALKPHOS 48 04/13/2021    ALT 21 04/13/2021    AST 18 04/13/2021    GLOB 4.0 04/13/2021       HBA1C:   Lab Results   Component Value Date    LABA1C 7.0 04/13/2021    .5 03/17/2020       OBJECTIVE:   /82   Pulse 65   Temp 98.1 °F (36.7 °C) (Oral)   Ht 5' 6\" (1.676 m)   Wt 204 lb 3.2 oz (92.6 kg)   SpO2 96%   BMI 32.96 kg/m²  Weight: 204 lb 3.2 oz (92.6 kg)   Physical Exam  Vitals and nursing note reviewed. Constitutional:       General: He is not in acute distress. Appearance: Normal appearance. He is well-developed and normal weight. He is not diaphoretic. electrolytes, creatinine, glucose  2. Change in medication regimen before surgery:Hold all medications on morning of surgery  3.  No contraindications to planned surgery    Electronically signed by RASHEEDA Arteaga CNP on 8/10/2021 at 9:05 AM

## 2021-08-09 ENCOUNTER — OFFICE VISIT (OUTPATIENT)
Dept: PRIMARY CARE CLINIC | Age: 71
End: 2021-08-09
Payer: MEDICARE

## 2021-08-09 VITALS
OXYGEN SATURATION: 96 % | HEART RATE: 65 BPM | SYSTOLIC BLOOD PRESSURE: 130 MMHG | DIASTOLIC BLOOD PRESSURE: 82 MMHG | BODY MASS INDEX: 32.82 KG/M2 | WEIGHT: 204.2 LBS | TEMPERATURE: 98.1 F | HEIGHT: 66 IN

## 2021-08-09 DIAGNOSIS — Z01.818 PRE-OP EVALUATION: ICD-10-CM

## 2021-08-09 DIAGNOSIS — N18.2 CKD (CHRONIC KIDNEY DISEASE), STAGE II: ICD-10-CM

## 2021-08-09 DIAGNOSIS — E11.9 DIABETES MELLITUS TYPE 2 IN NONOBESE (HCC): ICD-10-CM

## 2021-08-09 DIAGNOSIS — R31.29 MICROSCOPIC HEMATURIA: Primary | ICD-10-CM

## 2021-08-09 PROCEDURE — 2022F DILAT RTA XM EVC RTNOPTHY: CPT | Performed by: NURSE PRACTITIONER

## 2021-08-09 PROCEDURE — 3017F COLORECTAL CA SCREEN DOC REV: CPT | Performed by: NURSE PRACTITIONER

## 2021-08-09 PROCEDURE — 99214 OFFICE O/P EST MOD 30 MIN: CPT | Performed by: NURSE PRACTITIONER

## 2021-08-09 PROCEDURE — G8417 CALC BMI ABV UP PARAM F/U: HCPCS | Performed by: NURSE PRACTITIONER

## 2021-08-09 PROCEDURE — 4040F PNEUMOC VAC/ADMIN/RCVD: CPT | Performed by: NURSE PRACTITIONER

## 2021-08-09 PROCEDURE — G8427 DOCREV CUR MEDS BY ELIG CLIN: HCPCS | Performed by: NURSE PRACTITIONER

## 2021-08-09 PROCEDURE — 1036F TOBACCO NON-USER: CPT | Performed by: NURSE PRACTITIONER

## 2021-08-09 PROCEDURE — 1123F ACP DISCUSS/DSCN MKR DOCD: CPT | Performed by: NURSE PRACTITIONER

## 2021-08-09 PROCEDURE — 3051F HG A1C>EQUAL 7.0%<8.0%: CPT | Performed by: NURSE PRACTITIONER

## 2021-08-09 PROCEDURE — 93000 ELECTROCARDIOGRAM COMPLETE: CPT | Performed by: NURSE PRACTITIONER

## 2021-08-09 ASSESSMENT — ENCOUNTER SYMPTOMS
COLOR CHANGE: 0
WHEEZING: 0
DIARRHEA: 0
NAUSEA: 0
BLOOD IN STOOL: 0
SORE THROAT: 0
SHORTNESS OF BREATH: 0
ABDOMINAL PAIN: 0
VOMITING: 0
CHEST TIGHTNESS: 0
COUGH: 0
CONSTIPATION: 0
BACK PAIN: 0

## 2021-08-09 ASSESSMENT — PATIENT HEALTH QUESTIONNAIRE - PHQ9
2. FEELING DOWN, DEPRESSED OR HOPELESS: 0
SUM OF ALL RESPONSES TO PHQ QUESTIONS 1-9: 0
1. LITTLE INTEREST OR PLEASURE IN DOING THINGS: 0
SUM OF ALL RESPONSES TO PHQ9 QUESTIONS 1 & 2: 0
SUM OF ALL RESPONSES TO PHQ QUESTIONS 1-9: 0
SUM OF ALL RESPONSES TO PHQ QUESTIONS 1-9: 0

## 2021-09-11 DIAGNOSIS — I10 ESSENTIAL HYPERTENSION: ICD-10-CM

## 2021-09-11 DIAGNOSIS — E11.8 TYPE 2 DIABETES MELLITUS WITH COMPLICATION, WITHOUT LONG-TERM CURRENT USE OF INSULIN (HCC): ICD-10-CM

## 2021-09-11 DIAGNOSIS — E78.2 MIXED HYPERLIPIDEMIA: ICD-10-CM

## 2021-09-13 RX ORDER — BLOOD SUGAR DIAGNOSTIC
STRIP MISCELLANEOUS
Qty: 100 STRIP | Refills: 3 | Status: SHIPPED | OUTPATIENT
Start: 2021-09-13

## 2021-09-13 RX ORDER — LOVASTATIN 40 MG/1
TABLET ORAL
Qty: 90 TABLET | Refills: 0 | Status: SHIPPED | OUTPATIENT
Start: 2021-09-13 | End: 2021-12-27 | Stop reason: SDUPTHER

## 2021-09-13 RX ORDER — LISINOPRIL 20 MG/1
TABLET ORAL
Qty: 90 TABLET | Refills: 0 | Status: SHIPPED | OUTPATIENT
Start: 2021-09-13 | End: 2021-12-27 | Stop reason: SDUPTHER

## 2021-09-13 NOTE — TELEPHONE ENCOUNTER
Medication:   Requested Prescriptions     Pending Prescriptions Disp Refills    ACCU-CHEK SMARTVIEW strip [Pharmacy Med Name: Casandra Baker   Strip] 100 strip 3     Sig: TEST BLOOD SUGAR EVERY DAY    metFORMIN (GLUCOPHAGE) 850 MG tablet [Pharmacy Med Name: METFORMIN HYDROCHLORIDE 850 MG Tablet] 180 tablet 0     Sig: TAKE 1 TABLET TWICE DAILY WITH MEALS    lovastatin (MEVACOR) 40 MG tablet [Pharmacy Med Name: LOVASTATIN 40 MG Tablet] 90 tablet 0     Sig: TAKE 1 TABLET EVERY NIGHT    lisinopril (PRINIVIL;ZESTRIL) 20 MG tablet [Pharmacy Med Name: LISINOPRIL 20 MG Tablet] 90 tablet 0     Sig: TAKE 1 TABLET EVERY DAY       Last appt: 8/9/2021   Next appt: Visit date not found    Last Labs DM:   Lab Results   Component Value Date    LABA1C 7.0 04/13/2021     Last Lipid:   Lab Results   Component Value Date    CHOL 175 04/13/2021    TRIG 208 04/13/2021    HDL 43 04/13/2021    LDLCALC 90 04/13/2021     Last PSA:   Lab Results   Component Value Date    PSA 1.56 09/16/2019     Last Thyroid:   Lab Results   Component Value Date    TSH 0.94 09/18/2018

## 2021-11-11 DIAGNOSIS — E11.8 TYPE 2 DIABETES MELLITUS WITH COMPLICATION, WITHOUT LONG-TERM CURRENT USE OF INSULIN (HCC): ICD-10-CM

## 2021-11-11 NOTE — TELEPHONE ENCOUNTER
Call patient to schedule appointment for diabetes, hypertension, hyperlipidemia, GERD, anemia, and vitamin D deficiency. He was last seen on 08/09/21 for preop and has no appointment scheduled.

## 2021-11-11 NOTE — TELEPHONE ENCOUNTER
Medication:   Requested Prescriptions     Pending Prescriptions Disp Refills    metFORMIN (GLUCOPHAGE) 850 MG tablet [Pharmacy Med Name: Daryl Sprague 850 MG Tablet] 180 tablet 0     Sig: TAKE 1 TABLET TWICE DAILY WITH MEALS     Last Filled: 9.13.21    Last appt: 8/9/2021   Next appt: Visit date not found    Last OARRS: No flowsheet data found.

## 2021-12-27 ENCOUNTER — OFFICE VISIT (OUTPATIENT)
Dept: PRIMARY CARE CLINIC | Age: 71
End: 2021-12-27
Payer: MEDICARE

## 2021-12-27 VITALS
WEIGHT: 208 LBS | RESPIRATION RATE: 16 BRPM | BODY MASS INDEX: 33.43 KG/M2 | DIASTOLIC BLOOD PRESSURE: 84 MMHG | SYSTOLIC BLOOD PRESSURE: 138 MMHG | OXYGEN SATURATION: 97 % | HEIGHT: 66 IN | TEMPERATURE: 98.1 F | HEART RATE: 66 BPM

## 2021-12-27 DIAGNOSIS — N18.2 CKD (CHRONIC KIDNEY DISEASE), STAGE II: ICD-10-CM

## 2021-12-27 DIAGNOSIS — Z00.00 ROUTINE GENERAL MEDICAL EXAMINATION AT A HEALTH CARE FACILITY: Primary | ICD-10-CM

## 2021-12-27 DIAGNOSIS — Z23 NEED FOR INFLUENZA VACCINATION: ICD-10-CM

## 2021-12-27 DIAGNOSIS — E11.8 TYPE 2 DIABETES MELLITUS WITH COMPLICATION, WITHOUT LONG-TERM CURRENT USE OF INSULIN (HCC): ICD-10-CM

## 2021-12-27 DIAGNOSIS — E78.2 MIXED HYPERLIPIDEMIA: ICD-10-CM

## 2021-12-27 DIAGNOSIS — R80.9 MICROALBUMINURIA: ICD-10-CM

## 2021-12-27 DIAGNOSIS — E55.9 VITAMIN D DEFICIENCY: ICD-10-CM

## 2021-12-27 DIAGNOSIS — D50.9 IRON DEFICIENCY ANEMIA, UNSPECIFIED IRON DEFICIENCY ANEMIA TYPE: ICD-10-CM

## 2021-12-27 DIAGNOSIS — I10 ESSENTIAL HYPERTENSION: ICD-10-CM

## 2021-12-27 DIAGNOSIS — K21.9 GASTROESOPHAGEAL REFLUX DISEASE, UNSPECIFIED WHETHER ESOPHAGITIS PRESENT: ICD-10-CM

## 2021-12-27 LAB
A/G RATIO: 1.1 (ref 1.1–2.2)
ALBUMIN SERPL-MCNC: 4.5 G/DL (ref 3.4–5)
ALP BLD-CCNC: 44 U/L (ref 40–129)
ALT SERPL-CCNC: 34 U/L (ref 10–40)
ANION GAP SERPL CALCULATED.3IONS-SCNC: 17 MMOL/L (ref 3–16)
AST SERPL-CCNC: 25 U/L (ref 15–37)
BASOPHILS ABSOLUTE: 0 K/UL (ref 0–0.2)
BASOPHILS RELATIVE PERCENT: 0.5 %
BILIRUB SERPL-MCNC: <0.2 MG/DL (ref 0–1)
BUN BLDV-MCNC: 11 MG/DL (ref 7–20)
CALCIUM SERPL-MCNC: 10 MG/DL (ref 8.3–10.6)
CHLORIDE BLD-SCNC: 100 MMOL/L (ref 99–110)
CHOLESTEROL, TOTAL: 157 MG/DL (ref 0–199)
CO2: 21 MMOL/L (ref 21–32)
CREAT SERPL-MCNC: 0.5 MG/DL (ref 0.8–1.3)
EOSINOPHILS ABSOLUTE: 0 K/UL (ref 0–0.6)
EOSINOPHILS RELATIVE PERCENT: 0.8 %
GFR AFRICAN AMERICAN: >60
GFR NON-AFRICAN AMERICAN: >60
GLUCOSE BLD-MCNC: 126 MG/DL (ref 70–99)
HBA1C MFR BLD: 6.5 %
HCT VFR BLD CALC: 36.7 % (ref 40.5–52.5)
HDLC SERPL-MCNC: 46 MG/DL (ref 40–60)
HEMOGLOBIN: 11.5 G/DL (ref 13.5–17.5)
IRON % SATURATION: 18 % (ref 20–50)
IRON: 60 UG/DL (ref 59–158)
LDL CHOLESTEROL CALCULATED: 79 MG/DL
LYMPHOCYTES ABSOLUTE: 2.1 K/UL (ref 1–5.1)
LYMPHOCYTES RELATIVE PERCENT: 34.6 %
MCH RBC QN AUTO: 27.4 PG (ref 26–34)
MCHC RBC AUTO-ENTMCNC: 31.5 G/DL (ref 31–36)
MCV RBC AUTO: 87.2 FL (ref 80–100)
MONOCYTES ABSOLUTE: 0.4 K/UL (ref 0–1.3)
MONOCYTES RELATIVE PERCENT: 6.4 %
NEUTROPHILS ABSOLUTE: 3.6 K/UL (ref 1.7–7.7)
NEUTROPHILS RELATIVE PERCENT: 57.7 %
PDW BLD-RTO: 15.1 % (ref 12.4–15.4)
PLATELET # BLD: 254 K/UL (ref 135–450)
PMV BLD AUTO: 8.6 FL (ref 5–10.5)
POTASSIUM SERPL-SCNC: 4.6 MMOL/L (ref 3.5–5.1)
RBC # BLD: 4.21 M/UL (ref 4.2–5.9)
REASON FOR REJECTION: NORMAL
REJECTED TEST: NORMAL
SODIUM BLD-SCNC: 138 MMOL/L (ref 136–145)
TOTAL IRON BINDING CAPACITY: 332 UG/DL (ref 260–445)
TOTAL PROTEIN: 8.5 G/DL (ref 6.4–8.2)
TRIGL SERPL-MCNC: 159 MG/DL (ref 0–150)
VLDLC SERPL CALC-MCNC: 32 MG/DL
WBC # BLD: 6.2 K/UL (ref 4–11)

## 2021-12-27 PROCEDURE — 90694 VACC AIIV4 NO PRSRV 0.5ML IM: CPT | Performed by: INTERNAL MEDICINE

## 2021-12-27 PROCEDURE — G0439 PPPS, SUBSEQ VISIT: HCPCS | Performed by: INTERNAL MEDICINE

## 2021-12-27 PROCEDURE — 1123F ACP DISCUSS/DSCN MKR DOCD: CPT | Performed by: INTERNAL MEDICINE

## 2021-12-27 PROCEDURE — 83036 HEMOGLOBIN GLYCOSYLATED A1C: CPT | Performed by: INTERNAL MEDICINE

## 2021-12-27 PROCEDURE — G0008 ADMIN INFLUENZA VIRUS VAC: HCPCS | Performed by: INTERNAL MEDICINE

## 2021-12-27 PROCEDURE — 3017F COLORECTAL CA SCREEN DOC REV: CPT | Performed by: INTERNAL MEDICINE

## 2021-12-27 PROCEDURE — G8484 FLU IMMUNIZE NO ADMIN: HCPCS | Performed by: INTERNAL MEDICINE

## 2021-12-27 PROCEDURE — 4040F PNEUMOC VAC/ADMIN/RCVD: CPT | Performed by: INTERNAL MEDICINE

## 2021-12-27 RX ORDER — LISINOPRIL 20 MG/1
TABLET ORAL
Qty: 90 TABLET | Refills: 1 | Status: SHIPPED | OUTPATIENT
Start: 2021-12-27 | End: 2022-06-22 | Stop reason: SDUPTHER

## 2021-12-27 RX ORDER — LOVASTATIN 40 MG/1
TABLET ORAL
Qty: 90 TABLET | Refills: 1 | Status: SHIPPED | OUTPATIENT
Start: 2021-12-27 | End: 2022-06-22 | Stop reason: SDUPTHER

## 2021-12-27 SDOH — ECONOMIC STABILITY: FOOD INSECURITY: WITHIN THE PAST 12 MONTHS, YOU WORRIED THAT YOUR FOOD WOULD RUN OUT BEFORE YOU GOT MONEY TO BUY MORE.: NEVER TRUE

## 2021-12-27 SDOH — ECONOMIC STABILITY: FOOD INSECURITY: WITHIN THE PAST 12 MONTHS, THE FOOD YOU BOUGHT JUST DIDN'T LAST AND YOU DIDN'T HAVE MONEY TO GET MORE.: NEVER TRUE

## 2021-12-27 ASSESSMENT — PATIENT HEALTH QUESTIONNAIRE - PHQ9
SUM OF ALL RESPONSES TO PHQ QUESTIONS 1-9: 0
SUM OF ALL RESPONSES TO PHQ9 QUESTIONS 1 & 2: 0
SUM OF ALL RESPONSES TO PHQ QUESTIONS 1-9: 0
SUM OF ALL RESPONSES TO PHQ QUESTIONS 1-9: 0
1. LITTLE INTEREST OR PLEASURE IN DOING THINGS: 0
2. FEELING DOWN, DEPRESSED OR HOPELESS: 0

## 2021-12-27 ASSESSMENT — SOCIAL DETERMINANTS OF HEALTH (SDOH): HOW HARD IS IT FOR YOU TO PAY FOR THE VERY BASICS LIKE FOOD, HOUSING, MEDICAL CARE, AND HEATING?: NOT HARD AT ALL

## 2021-12-27 ASSESSMENT — LIFESTYLE VARIABLES: HOW OFTEN DO YOU HAVE A DRINK CONTAINING ALCOHOL: 0

## 2021-12-27 NOTE — PROGRESS NOTES
Medicare Annual Wellness Visit  Name: Veronica Wick Date: 2021   MRN: [id number] Sex: Male   Age: 66 y.o. Ethnicity: Non- / Non    : 1950 Race: White (non-)      Laci Murdock is here for Medicare AWV, Hypertension, Hyperlipidemia, Gastroesophageal Reflux, Other (Vitamin D deficiency ), and Diabetes    Screenings for behavioral, psychosocial and functional/safety risks, and cognitive dysfunction are all negative except as indicated below. These results, as well as other patient data from the [address] form, are documented in Flowsheets linked to this Encounter. Diabetes Mellitus Type 2:   Current Medications: Metformin 850mg po bid  Diet: Patient does a low carbohydrate diet  Home blood sugar records: Patient tests once daily fasting and it averages 115-130  Any episodes of hypoglycemia? no  Eye exam current (within one year): 2021 per patient  Current exercise: walks some for exercise but not regular     Hypertension:    Current Medications: Lisinopril 20mg po q day  Home blood pressure monitoring: Yes - and it averages 120/75-80  Diet:low salt  Current exercise: walks some for exercise but not regular. Patient is active helping babysit his grandchildren who are under the age of 2     Hyperlipidemia:    Current medications: Lovastatin 40mg po qhs  Side effects: none  Diet:  low fat, low cholesterol diet  Current exercise: walks some for exercise but not regular     GERD :   Current Medication: Omeprazole 40mg po q day  Patient states he doesn't have heartburn and reflux very often. Patient states he doesn't eat spicy food. Patient states he eats tomato based foods such as  ketchup and tomatoes.  Patient drinks 2-3 cups of coffee per day.     Iron deficiency anemia:  Current Medication: Ferrous Sulfate 325mg po bid  Patient eats red meat and green leafy vegetables     Vitamin D deficiency:   Current Medication: vitamin D 5,000 IU po q day     Microalbuminuria and stage 2 chronic kidney disease:  Current Medication: Lisinopril 20mg po q day  Patient is seeing Nephrology      Patient declines Shingrix. Patient plans to get a Covid booster. No Known Allergies      Prior to Visit Medications    Medication Sig Taking? Authorizing Provider   lovastatin (MEVACOR) 40 MG tablet TAKE 1 TABLET EVERY NIGHT Yes Moise Nyhan, MD   lisinopril (PRINIVIL;ZESTRIL) 20 MG tablet TAKE 1 TABLET EVERY DAY Yes Moise Nyhan, MD   metFORMIN (GLUCOPHAGE) 850 MG tablet TAKE 1 TABLET TWICE DAILY WITH MEALS Yes Moise Nyhan, MD   ACCU-CHEK SMARTVIEW strip TEST BLOOD SUGAR EVERY DAY Yes Moise Nyhan, MD   diclofenac sodium (VOLTAREN) 1 % GEL Apply 2 g topically 2 times daily Yes Moise Nyhan, MD   ferrous sulfate 325 (65 Fe) MG tablet Take 1 tablet by mouth daily (with breakfast) Yes Moise Nyhan, MD   Blood Glucose Calibration (ACCU-CHEK SMARTVIEW CONTROL) LIQD Use as directed Yes Moise Nyhan, MD   ACCU-CHEK FASTCLIX LANCETS MISC 1 each by Does not apply route daily Yes Moise Nyhan, MD   magnesium oxide (MAG-OX) 400 MG tablet Take 400 mg by mouth daily Yes Historical Provider, MD   Alcohol Swabs (B-D SINGLE USE SWABS REGULAR) PADS USE AS DIRECTED Yes Moise Nyhan, MD   HOMEOPATHIC PRODUCTS EX Apply topically Indications: Gold Bond Diabetic Cream Yes Historical Provider, MD   Blood Glucose Monitoring Suppl (ACCU-CHEK SOTERO SMARTVIEW) W/DEVICE KIT Use as directed Yes Moise Nyhan, MD   acetaminophen (TYLENOL) 325 MG tablet Take 650 mg by mouth every 6 hours as needed for Pain. Yes Historical Provider, MD   vitamin D-3 (CHOLECALCIFEROL) 5000 UNITS TABS Take 1 tablet by mouth daily. Yes Moise Nyhan, MD   omeprazole (PRILOSEC) 40 MG capsule Take 40 mg by mouth daily. Yes Historical Provider, MD   Glucosamine 500 MG CAPS Take  by mouth daily.  Yes Historical Provider, MD   Multiple Vitamins-Minerals (MULTIVITAMIN PO) Take by mouth. Yes Historical Provider, MD         Past Medical History:   Diagnosis Date    Anemia 5/21/2013    Paredes's esophagus     Colon polyps     GERD (gastroesophageal reflux disease)     Hyperlipidemia 9/20/2014    Hypertension 5/3/2013    Iron deficiency anemia 10/8/2013    Microalbuminuria 11/18/2016    Osteoarthritis 9/20/2014    Primary osteoarthritis of both knees 4/12/2016    Right inguinal hernia 5/3/2013    Vitamin D deficiency 12/19/2013       Past Surgical History:   Procedure Laterality Date    COLONOSCOPY  12/19/13    COLONOSCOPY  3/7/16    repeat in 5 years - Sadie DOWELL   [address] Right 2013    inguinal     OTHER SURGICAL HISTORY Left 08/03/2015    Left Buttock mass exesion      TONSILLECTOMY      UPPER GASTROINTESTINAL ENDOSCOPY  12/19/13    UPPER GASTROINTESTINAL ENDOSCOPY  3/7/16    repeat in 3 yrs - Sadie DOWELL    WISDOM TOOTH EXTRACTION           Family History   Problem Relation Age of Onset    Cancer Mother 76        hip tumor    Cancer Father 72        throat    Diabetes Neg Hx     Hypertension Neg Hx     Heart Disease Neg Hx        CareTeam (Including outside providers/suppliers regularly involved in providing care):   Patient Care Team:  Hiren Coyne MD as PCP - General (Internal Medicine)  Hiren Coyne MD as PCP [facility]  Jovan Pennington MD as Surgeon (General Surgery)  Jeremiah Cooper MD as Consulting Physician (Gastroenterology)    Wt Readings from Last 3 Encounters:   12/27/21 208 lb (94.3 kg)   08/09/21 204 lb 3.2 oz (92.6 kg)   07/19/21 200 lb (90.7 kg)     Vitals:    12/27/21 1037   BP: 138/84   Pulse: 66   Resp: 16   Temp: 98.1 °F (36.7 °C)   SpO2: 97%   Weight: 208 lb (94.3 kg)   Height: 5' 6\" (1.676 m)     Body mass index is 33.57 kg/m². Based upon direct observation of the patient, evaluation of cognition reveals recent and remote memory intact.     General Appearance: alert and oriented to person, place and time, well-developed and well-nourished, in no acute distress  Head: normocephalic and atraumatic  Eyes: pupils equal, round, and reactive to light, extraocular eye movements intact, conjunctivae normal  Neck: neck supple and non tender without mass, no thyromegaly or thyroid nodules, no cervical lymphadenopathy   Pulmonary/Chest: clear to auscultation bilaterally- no wheezes, rales or rhonchi, normal air movement, no respiratory distress  Cardiovascular: normal rate, regular rhythm, normal S1 and S2, intact distal pulses and no carotid bruits  Abdomen: soft, non-tender, non-distended, normal bowel sounds, no masses or organomegaly  Extremities: no edema  Musculoskeletal: normal range of motion, no joint swelling, deformity or tenderness  Neurologic: gait and coordination normal and speech normal    Patient's complete Health Risk Assessment and screening values have been reviewed and are found in Flowsheets. The following problems were reviewed today and where indicated follow up appointments were made and/or referrals ordered. Positive Risk Factor Screenings with Interventions:              General Health and ACP:  General  In general, how would you say your health is?: Fair  In the past 7 days, have you experienced any of the following?  New or Increased Pain, New or Increased Fatigue, Loneliness, Social Isolation, Stress or Anger?: None of These  Do you get the social and emotional support that you need?: Yes  Do you have a Living Will?: (!) No  Advance Directives     Power of [facility] Will ACP-Advance Directive ACP-Power of     Not on File Not on [address] Habits/Nutrition:  Health Habits/Nutrition  Do you exercise for at least 20 minutes 2-3 times per week?: Yes  Have you lost any weight without trying in the past 3 months?: No  Do you eat only one meal per day?: No  Have you seen the dentist within the past year?: N/A - wear dentures  Body mass index: (!) 33.57  Health Habits/Nutrition Interventions:        Personalized Preventive Plan   Current Health Maintenance Status  Immunization History   Administered Date(s) Administered    COVID-19, Kimberlee Ryan, Primary or Immunocompromised, PF, 100mcg/0.5mL 04/16/2021, 05/14/2021    Hepatitis B Adult (Engerix-B) 03/17/2020, 09/17/2020    Hepatitis B Adult (Recombivax HB) 04/17/2020    Influenza 10/08/2013    Influenza Virus Vaccine 10/08/2013, 10/18/2014    Influenza, High Dose (Fluzone 65 yrs and older) 12/01/2015, 11/14/2016, 09/07/2017, 09/18/2018    Influenza, Quadv, adjuvanted, 65 yrs +, IM, PF (Fluad) 09/17/2020, 12/27/2021    Influenza, Triv, inactivated, subunit, adjuvanted, IM (Fluad 65 yrs and older) 09/16/2019    Pneumococcal Conjugate 13-valent ([id number]) 12/01/2015    Pneumococcal Polysaccharide ([id number]) 08/18/2014    Tdap (Boostrix, Adacel) 05/03/2013    Zoster Live (Zostavax) 05/09/2013        Health Maintenance   Topic Date Due    Shingles Vaccine (2 of 3) 07/04/2013    Diabetic retinal exam  11/27/2018    Annual Wellness Visit (AWV)  03/18/2021    COVID-19 Vaccine (3 - Booster for Kimberlee Sparksl series) 11/14/2021    Diabetic foot exam  04/13/2022    A1C test (Diabetic or Prediabetic)  12/27/2022    Lipid screen  12/27/2022    Depression Screen  12/27/2022    Potassium monitoring  12/27/2022    Creatinine monitoring  12/27/2022    DTaP/Tdap/Td vaccine (2 - Td or Tdap) 05/03/2023    Colon cancer screen colonoscopy  06/03/2026    Flu vaccine  Completed    Pneumococcal 65+ years Vaccine  Completed    AAA screen  Completed    Hepatitis C screen  Completed    Hepatitis A vaccine  Aged Out    Hib vaccine  Aged Out    Meningococcal (ACWY) vaccine  Aged Out     Recommendations for Boost Your Campaign Due: see orders and patient instructions/AVS.  .   Recommended screening schedule for the next 5-10 years is provided to the patient in written form: see Patient Instructions/AVS.    Lab Review   Results for POC orders placed in visit on 12/27/21   POCT glycosylated hemoglobin (Hb A1C)   Result Value Ref Range    Hemoglobin A1C 6.5 %       Orders Only on 08/09/2021   Component Date Value    Sodium 08/09/2021 140     Potassium 08/09/2021 4.4     Chloride 08/09/2021 100     CO2 08/09/2021 24     Anion Gap 08/09/2021 16     Glucose 08/09/2021 102*    BUN 08/09/2021 12     CREATININE 08/09/2021 0.7*    GFR Non- 08/09/2021 >60     GFR  08/09/2021 >60     Calcium 08/09/2021 9.8     WBC 08/09/2021 7.9     RBC 08/09/2021 3.98*    Hemoglobin 08/09/2021 11.2*    Hematocrit 08/09/2021 33.3*    MCV 08/09/2021 83.7     MCH 08/09/2021 28.0     MCHC 08/09/2021 33.5     RDW 08/09/2021 14.1     Platelets 81/32/1491 303     MPV 08/09/2021 8.3    Orders Only on 07/22/2021   Component Date Value    BUN 07/22/2021 12     CREATININE 07/22/2021 0.5*    BUN/Creatinine Ratio 07/22/2021 24.0    Admission on 07/19/2021, Discharged on 07/19/2021   Component Date Value    Color, UA 07/19/2021 RED*    Clarity, UA 07/19/2021 TURBID*    Glucose, Ur 07/19/2021 >=1000*    Bilirubin Urine 07/19/2021 Negative     Ketones, Urine 07/19/2021 Negative     Specific Gravity, UA 07/19/2021 >=1.030     Blood, Urine 07/19/2021 LARGE*    pH, UA 07/19/2021 6.0     Protein, UA 07/19/2021 100*    Urobilinogen, Urine 07/19/2021 0.2     Nitrite, Urine 07/19/2021 Negative     Leukocyte Esterase, Urine 07/19/2021 SMALL*    Microscopic Examination 07/19/2021 YES     Urine Type 07/19/2021 NotGiven     WBC, UA 07/19/2021 0-2     RBC, UA 07/19/2021 >100*    Urinalysis Comments 07/19/2021 see below     Urine Culture, Routine 07/19/2021 <50,000 CFU/ml mixed skin/urogenital kati. No further workup      Lab Results   Component Value Date    VITD25 35.0 12/27/2021         Carlton Zuniga was seen today for diabetes, hypertension, hyperlipidemia, gastroesophageal reflux, medicare awv and other.     Diagnoses and all orders for this visit: 1. Routine general medical examination at a health care facility  -Medicare AWV done    2. Type 2 diabetes mellitus with complication, without long-term current use of insulin (HCC)  -Hemoglobin A1c of 6.5% shows diabetes is controlled  -Continue same medications  -Limit carbohydrates to 45 grams with meals and 15 grams with snacks  -monitor blood sugars  -goal for blood sugar fasting or pre-meal  is   -goal for blood sugar 2 hours after a meal is less than 180  -goal for blood sugar at bedtime is less than 150  -Regular aerobic exercise  - POCT glycosylated hemoglobin (Hb A1C)  - Comprehensive Metabolic Panel; Future    3. Essential hypertension  -stable  -Continue same medications  -Low sodium diet  -Regular aerobic exercise  - Comprehensive Metabolic Panel; Future    4. Mixed hyperlipidemia  -stable  -Continue same medications  -Low fat, low cholesterol diet  -Regular aerobic exercise  - Comprehensive Metabolic Panel; Future  - Lipid Panel; Future    5. Gastroesophageal reflux disease, unspecified whether esophagitis present  -stable  -Continue same medications  -Decrease caffeine, avoid spicy foods, avoid tomato based foods  -Eat small meals instead of large meals  -Wait 2-3 hours after eating before lying down     6. Iron deficiency anemia, unspecified iron deficiency anemia type  -stable  -Continue same medications  - CBC Auto Differential; Future  - Iron and TIBC; Future    7. Vitamin D deficiency  -stable  -Continue same medications  - Vitamin D 25 Hydroxy; Future    8. CKD (chronic kidney disease), stage II  -stable  -Continue same medications  -Comprehensive Metabolic Panel; Future  -continue care per Nephrology    9. Need for influenza vaccination\  - INFLUENZA, QUADV, ADJUVANTED, 72 YRS =, IM, PF, PREFILL SYR, 0.5ML (FLUAD) given      Return in 6 months (on 6/27/2022) for diabetes, hypertension, hyperlipidemia, GERD, iron deficiency anemia, and vitamin D deficiency.

## 2021-12-27 NOTE — PATIENT INSTRUCTIONS
Patient Education        Influenza (Flu) Vaccine (Inactivated or Recombinant): What You Need to Know  Why get vaccinated? Influenza vaccine can prevent influenza (flu). Flu is a contagious disease that spreads around the [place] every year, usually between October and May. Anyone can get the flu, but it is more dangerous for some people. Infants and young children, people 72 years and older, pregnant people, and people with certain health conditions or a weakened immune system are at greatest risk of flu complications. Pneumonia, bronchitis, sinus infections, and ear infections are examples of flu-related complications. If you have a medical condition, such as heart disease, cancer, or diabetes, flu can make it worse. Flu can cause fever and chills, sore throat, muscle aches, fatigue, cough, headache, and runny or stuffy nose. Some people may have vomiting and diarrhea, though this is more common in children than adults. Each year, thousands of people in the [facility] die from flu, and many more are hospitalized. Flu vaccine prevents millions of illnesses and flu-related visits to the doctor each year. Influenza vaccines  CDC recommends everyone 6 months and older get vaccinated every flu season. Children 6 months through 6years of age may need 2 doses during a single flu season. Everyone else needs only 1 dose each flu season. It takes about 2 weeks for protection to develop after vaccination. There are many flu viruses, and they are always changing. Each year a new flu vaccine is made to protect against the influenza viruses believed to be likely to cause disease in the upcoming flu season. Even when the vaccine doesn't exactly match these viruses, it may still provide some protection. Influenza vaccine does not cause flu. Influenza vaccine may be given at the same time as other vaccines.   Talk with your health care provider  Tell your vaccination provider if the person getting the vaccine:  · Has had an allergic reaction after a previous dose of influenza vaccine, or has any severe, life-threatening allergies  · Has ever had Guillain-Barré Syndrome (also called \"GBS\")  In some cases, your health care provider may decide to postpone influenza vaccination until a future visit. Influenza vaccine can be administered at any time during pregnancy. People who are or will be pregnant during influenza season should receive inactivated influenza vaccine. People with minor illnesses, such as a cold, may be vaccinated. People who are moderately or severely ill should usually wait until they recover before getting influenza vaccine. Your health care provider can give you more information. Risks of a vaccine reaction  · Soreness, redness, and swelling where the shot is given, fever, muscle aches, and headache can happen after influenza vaccination. · There may be a very small increased risk of Guillain-Barré Syndrome (GBS) after inactivated influenza vaccine (the flu shot). Josue Rodolfo children who get the flu shot along with pneumococcal vaccine (PCV13) and/or DTaP vaccine at the same time might be slightly more likely to have a seizure caused by fever. Tell your health care provider if a child who is getting flu vaccine has ever had a seizure. People sometimes faint after medical procedures, including vaccination. Tell your provider if you feel dizzy or have vision changes or ringing in the ears. As with any medicine, there is a very remote chance of a vaccine causing a severe allergic reaction, other serious injury, or death. What if there is a serious problem? An allergic reaction could occur after the vaccinated person leaves the clinic.  If you see signs of a severe allergic reaction (hives, swelling of the face and throat, difficulty breathing, a fast heartbeat, dizziness, or weakness), call 9-1-1 and get the person to the nearest hospital.  For other signs that concern you, call your health care provider. Adverse reactions should be reported to the Vaccine Adverse Event Reporting System (VAERS). Your health care provider will usually file this report, or you can do it yourself. Visit the VAERS website at www.vaers. [facility].gov or call 1-754.408.5847. VAERS is only for reporting reactions, and VAERS staff members do not give medical advice. The National Vaccine Injury Compensation Program  The National Vaccine Injury Compensation Program (VICP) is a federal program that was created to compensate people who may have been injured by certain vaccines. Claims regarding alleged injury or death due to vaccination have a time limit for filing, which may be as short as two years. Visit the VICP website at www.[facility].gov/vaccinecompensation or call 4-484.268.8915 to learn about the program and about filing a claim. How can I learn more? · Ask your health care provider. · Call your local or state health department. · Visit the website of the Food and Drug Administration (FDA) for vaccine package inserts and additional information at www.fda.gov/vaccines-blood-biologics/vaccines. · Contact the Centers for Disease Control and Prevention (CDC):  ? Call 2-473.571.4957 (1-800-CDC-INFO) or  ? Visit CDC's website at www.cdc.gov/flu. Vaccine Information Statement  Inactivated Influenza Vaccine  8/6/2021  41 FREDERICK Woo [id number]  [facility] and [facility] for Disease Control and Prevention  Many vaccine information statements are available in Congolese and other languages. See www.immunize.org/vis. Hojas de información sobre vacunas están disponibles en español y en muchos otros idiomas. Visite www.immunize.org/vis. Care instructions adapted under license by [facility] ([facility]). If you have questions about a medical condition or this instruction, always ask your healthcare professional. [address] any warranty or liability for your use of this information.          Personalized Preventive Plan for [facility] Smoker - 12/27/2021  Medicare offers a range of preventive health benefits. Some of the tests and screenings are paid in full while other may be subject to a deductible, co-insurance, and/or copay. Some of these benefits include a comprehensive review of your medical history including lifestyle, illnesses that may run in your family, and various assessments and screenings as appropriate. After reviewing your medical record and screening and assessments performed today your provider may have ordered immunizations, labs, imaging, and/or referrals for you. A list of these orders (if applicable) as well as your Preventive Care list are included within your After Visit Summary for your review. Other Preventive Recommendations:    · A preventive eye exam performed by an eye specialist is recommended every 1-2 years to screen for glaucoma; cataracts, macular degeneration, and other eye disorders. · A preventive dental visit is recommended every 6 months. · Try to get at least 150 minutes of exercise per week or 10,000 steps per day on a pedometer . · Order or download the FREE \"Exercise & Physical Activity: Your Everyday Guide\" from [employer]. Call 7-982.960.6588 or search [employer] online. · You need 6955-4182 mg of calcium and 4790-5942 IU of vitamin D per day. It is possible to meet your calcium requirement with diet alone, but a vitamin D supplement is usually necessary to meet this goal.  · When exposed to the sun, use a sunscreen that protects against both UVA and UVB radiation with an SPF of 30 or greater. Reapply every 2 to 3 hours or after sweating, drying off with a towel, or swimming. · Always wear a seat belt when traveling in a car. Always wear a helmet when riding a bicycle or motorcycle. Patient Education        Learning About Living Kemar Scriver  What is a living will? A living will, also called a declaration, is a legal form.  It tells your family and your doctor your wishes when you can't speak for yourself. It's used by the health professionals who will treat you as you near the end of your life or if you get seriously hurt or ill. If you put your wishes in writing, your loved ones and others will know what kind of care you want. They won't need to guess. This can ease your mind and be helpful to others. And you can change or cancel your living will at any time. A living will is not the same as an estate or property will. An estate will explains what you want to happen with your money and property after you die. How do you use it? A living will is used to describe the kinds of treatment or life support you want as you near the end of your life or if you get seriously hurt or ill. Keep these facts in mind about living chi. Your living will is used only if you can't speak or make decisions for yourself. Most often, one or more doctors must certify that you can't speak or decide for yourself before your living will takes effect. If you get better and can speak for yourself again, you can accept or refuse any treatment. It doesn't matter what you said in your living will. Some states may limit your right to refuse treatment in certain cases. For example, you may need to clearly state in your living will that you don't want artificial hydration and nutrition, such as being fed through a tube. Is a living will a legal document? A living will is a legal document. Each state has its own laws about living chi. And a living will may be called something else in your state. Here are some things to know about living chi. You don't need an  to complete a living will. But legal advice can be helpful if your state's laws are unclear. It can also help if your health history is complicated or your family can't agree on what should be in your living will. You can change your living will at any time.  Some people find that their wishes about end-of-life care change as their health changes. If you make big changes to your living will, complete a new form. If you move to another state, make sure that your living will is legal in the state where you now live. In most cases, doctors will respect your wishes even if you have a form from a different state. You might use a universal form that has been approved by many states. This kind of form can sometimes be filled out and stored online. Your digital copy will then be available wherever you have a connection to the internet. The doctors and nurses who need to treat you can find it right away. Your state may offer an online registry. This is another place where you can store your living will online. It's a good idea to get your living will notarized. This means using a person called a [employer] to watch two people sign, or witness, your living will. What should you know when you create a living will? Here are some questions to ask yourself as you make your living will:  Do you know enough about life support methods that might be used? If not, talk to your doctor so you know what might be done if you can't breathe on your own, your heart stops, or you can't swallow. What things would you still want to be able to do after you receive life-support methods? Would you want to be able to walk? To speak? To eat on your own? To live without the help of machines? Do you want certain Jew practices performed if you become very ill? If you have a choice, where do you want to be cared for? In your home? At a hospital or nursing home? If you have a choice at the end of your life, where would you prefer to die? At home? In a hospital or nursing home? Somewhere else? Would you prefer to be buried or cremated? Do you want your organs to be donated after you die? What should you do with your living will?   Make sure that your family members and your health care agent have copies of your living will (also called a declaration). Give your doctor a copy of your living will. Ask him or her to keep it as part of your medical record. If you have more than one doctor, make sure that each one has a copy. Put a copy of your living will where it can be easily found. For example, some people may put a copy on their refrigerator door. If you are using a digital copy, be sure your doctor, family members, and health care agent know how to find and access it. Where can you learn more? Go to https://[employer].[email or web address].[employer]. org and sign in to your [employer] account. Enter Z301 in the Easy-Point box to learn more about \"Learning About Living [place]. \"     If you do not have an account, please click on the \"Sign Up Now\" link. Current as of: March 17, 2021               Content Version: 13.1  © 5880-1535 Healthwise, [employer]. Care instructions adapted under license by [facility] ([facility]). If you have questions about a medical condition or this instruction, always ask your healthcare professional. [address] any warranty or liability for your use of this information.

## 2021-12-28 ENCOUNTER — TELEPHONE (OUTPATIENT)
Dept: PRIMARY CARE CLINIC | Age: 71
End: 2021-12-28

## 2021-12-28 LAB — VITAMIN D 25-HYDROXY: 35 NG/ML

## 2021-12-28 NOTE — TELEPHONE ENCOUNTER
[place] calling with rejected labs    MICROALBUMIN / CREATININE URINE RATIO [[id number]]  (stated the label and order was old)    Microscopic Urinalysis (stated the label and order was old)    Urinalysis (stated label and order was old)

## 2022-05-04 PROBLEM — E11.22 TYPE 2 DIABETES MELLITUS WITH CHRONIC KIDNEY DISEASE (HCC): Status: ACTIVE | Noted: 2022-05-04

## 2022-06-18 DIAGNOSIS — I10 ESSENTIAL HYPERTENSION: ICD-10-CM

## 2022-06-18 DIAGNOSIS — E11.8 TYPE 2 DIABETES MELLITUS WITH COMPLICATION, WITHOUT LONG-TERM CURRENT USE OF INSULIN (HCC): ICD-10-CM

## 2022-06-18 DIAGNOSIS — E78.2 MIXED HYPERLIPIDEMIA: ICD-10-CM

## 2022-06-20 NOTE — TELEPHONE ENCOUNTER
Medication:   Requested Prescriptions     Pending Prescriptions Disp Refills    lovastatin (MEVACOR) 40 MG tablet [Pharmacy Med Name: LOVASTATIN 40 MG Tablet] 90 tablet 1     Sig: TAKE 1 TABLET EVERY NIGHT    lisinopril (PRINIVIL;ZESTRIL) 20 MG tablet [Pharmacy Med Name: LISINOPRIL 20 MG Tablet] 90 tablet 1     Sig: TAKE 1 TABLET EVERY DAY    metFORMIN (GLUCOPHAGE) 850 MG tablet [Pharmacy Med Name: METFORMIN HYDROCHLORIDE 850 MG Tablet] 180 tablet 1     Sig: TAKE 1 TABLET TWICE DAILY WITH MEALS       Last Filled:  02/25/2022  Metformin                        12/27/2021 Lovastatin & Lisinopril    Patient Phone Number: [phone] (home)     Last appt: 12/27/2021 AWV  Next appt: 6/27/2022    Last BMP:   Lab Results   Component Value Date     12/27/2021    K 4.6 12/27/2021     12/27/2021    CO2 21 12/27/2021    ANIONGAP 17 12/27/2021    GLUCOSE 126 12/27/2021    BUN 11 12/27/2021    CREATININE 0.5 12/27/2021    LABGLOM >60 12/27/2021    GFRAA >60 12/27/2021    GFRAA >60 05/03/2013    CALCIUM 10.0 12/27/2021      Last CMP:   Lab Results   Component Value Date     12/27/2021    K 4.6 12/27/2021     12/27/2021    CO2 21 12/27/2021    ANIONGAP 17 12/27/2021    GLUCOSE 126 12/27/2021    BUN 11 12/27/2021    CREATININE 0.5 12/27/2021    LABGLOM >60 12/27/2021    GFRAA >60 12/27/2021    GFRAA >60 05/03/2013    PROT 8.5 12/27/2021    LABALBU 4.5 12/27/2021    AGRATIO 1.1 12/27/2021    BILITOT <0.2 12/27/2021    ALKPHOS 44 12/27/2021    ALT 34 12/27/2021    AST 25 12/27/2021    GLOB 4.0 04/13/2021     Last Renal Function:   Lab Results   Component Value Date     12/27/2021    K 4.6 12/27/2021     12/27/2021    CO2 21 12/27/2021    GLUCOSE 126 12/27/2021    BUN 11 12/27/2021    CREATININE 0.5 12/27/2021    LABALBU 4.5 12/27/2021    CALCIUM 10.0 12/27/2021    GFR >60 05/03/2013    GFRAA >60 12/27/2021    GFRAA >60 05/03/2013     Last Labs DM:   Lab Results   Component Value Date    LABA1C 6.5 12/27/2021     Last Lipid:   Lab Results   Component Value Date    CHOL 157 12/27/2021    TRIG 159 12/27/2021    HDL 46 12/27/2021    LDLCALC 79 12/27/2021     Last PSA:   Lab Results   Component Value Date    PSA 1.56 09/16/2019     Last Thyroid:   Lab Results   Component Value Date    TSH 0.94 09/18/2018       Last OARRS: No flowsheet data found.     Preferred Pharmacy:   [facility] (Now [address] [facility]) - [address] [address]  Phone: [phone] Fax: [phone]

## 2022-06-22 ENCOUNTER — PATIENT MESSAGE (OUTPATIENT)
Dept: PRIMARY CARE CLINIC | Age: 72
End: 2022-06-22

## 2022-06-22 DIAGNOSIS — E11.8 TYPE 2 DIABETES MELLITUS WITH COMPLICATION, WITHOUT LONG-TERM CURRENT USE OF INSULIN (HCC): ICD-10-CM

## 2022-06-22 DIAGNOSIS — I10 ESSENTIAL HYPERTENSION: ICD-10-CM

## 2022-06-22 DIAGNOSIS — E78.2 MIXED HYPERLIPIDEMIA: ICD-10-CM

## 2022-06-22 RX ORDER — LOVASTATIN 40 MG/1
TABLET ORAL
Qty: 90 TABLET | Refills: 1 | Status: SHIPPED | OUTPATIENT
Start: 2022-06-22

## 2022-06-22 RX ORDER — LISINOPRIL 20 MG/1
TABLET ORAL
Qty: 90 TABLET | Refills: 1 | Status: SHIPPED | OUTPATIENT
Start: 2022-06-22 | End: 2022-11-02

## 2022-06-22 NOTE — TELEPHONE ENCOUNTER
From: Kayla Logan  To: Dr. Denver Peeks: 6/22/2022 5:34 AM EDT  Subject: online pharmacy    [facility]. Everything else should be the same. Need renewals for LISINOPRIL , METFORMIN & LOVASTATIN. Also please up date Yun's record.

## 2022-06-22 NOTE — TELEPHONE ENCOUNTER
Medication:   Requested Prescriptions     Pending Prescriptions Disp Refills    lisinopril (PRINIVIL;ZESTRIL) 20 MG tablet 90 tablet 1     Sig: TAKE 1 TABLET EVERY DAY    metFORMIN (GLUCOPHAGE) 850 MG tablet 180 tablet 1    lovastatin (MEVACOR) 40 MG tablet 90 tablet 1     Sig: TAKE 1 TABLET EVERY NIGHT     Last Filled: 12.27.21 1.25.22 12.27.21    Last appt: 12/27/2021   Next appt: 6/27/2022    Last OARRS: No flowsheet data found.

## 2022-06-24 RX ORDER — LISINOPRIL 20 MG/1
TABLET ORAL
Qty: 90 TABLET | Refills: 1 | OUTPATIENT
Start: 2022-06-24

## 2022-06-24 RX ORDER — LOVASTATIN 40 MG/1
TABLET ORAL
Qty: 90 TABLET | Refills: 1 | OUTPATIENT
Start: 2022-06-24

## 2022-06-27 ENCOUNTER — OFFICE VISIT (OUTPATIENT)
Dept: PRIMARY CARE CLINIC | Age: 72
End: 2022-06-27
Payer: MEDICARE

## 2022-06-27 VITALS
HEART RATE: 70 BPM | WEIGHT: 200.6 LBS | DIASTOLIC BLOOD PRESSURE: 76 MMHG | OXYGEN SATURATION: 98 % | BODY MASS INDEX: 32.24 KG/M2 | RESPIRATION RATE: 16 BRPM | SYSTOLIC BLOOD PRESSURE: 124 MMHG | TEMPERATURE: 97.9 F | HEIGHT: 66 IN

## 2022-06-27 DIAGNOSIS — E55.9 VITAMIN D DEFICIENCY: ICD-10-CM

## 2022-06-27 DIAGNOSIS — D50.9 IRON DEFICIENCY ANEMIA, UNSPECIFIED IRON DEFICIENCY ANEMIA TYPE: ICD-10-CM

## 2022-06-27 DIAGNOSIS — N18.2 CKD (CHRONIC KIDNEY DISEASE), STAGE II: ICD-10-CM

## 2022-06-27 DIAGNOSIS — E78.2 MIXED HYPERLIPIDEMIA: ICD-10-CM

## 2022-06-27 DIAGNOSIS — R80.9 POSITIVE FOR MACROALBUMINURIA: ICD-10-CM

## 2022-06-27 DIAGNOSIS — I10 ESSENTIAL HYPERTENSION: ICD-10-CM

## 2022-06-27 DIAGNOSIS — E11.42 DIABETIC POLYNEUROPATHY ASSOCIATED WITH TYPE 2 DIABETES MELLITUS (HCC): ICD-10-CM

## 2022-06-27 DIAGNOSIS — E11.8 TYPE 2 DIABETES MELLITUS WITH COMPLICATION, WITHOUT LONG-TERM CURRENT USE OF INSULIN (HCC): Primary | ICD-10-CM

## 2022-06-27 DIAGNOSIS — K21.9 GASTROESOPHAGEAL REFLUX DISEASE, UNSPECIFIED WHETHER ESOPHAGITIS PRESENT: ICD-10-CM

## 2022-06-27 LAB
ALBUMIN SERPL-MCNC: 4.7 G/DL (ref 3.4–5)
ALP BLD-CCNC: 50 U/L (ref 40–129)
ALT SERPL-CCNC: 22 U/L (ref 10–40)
ANION GAP SERPL CALCULATED.3IONS-SCNC: 14 MMOL/L (ref 3–16)
AST SERPL-CCNC: 20 U/L (ref 15–37)
BACTERIA: ABNORMAL /HPF
BILIRUB SERPL-MCNC: 0.3 MG/DL (ref 0–1)
BILIRUBIN DIRECT: <0.2 MG/DL (ref 0–0.3)
BILIRUBIN URINE: NEGATIVE
BILIRUBIN, INDIRECT: ABNORMAL MG/DL (ref 0–1)
BLOOD, URINE: NEGATIVE
BUN BLDV-MCNC: 13 MG/DL (ref 7–20)
CALCIUM SERPL-MCNC: 10.1 MG/DL (ref 8.3–10.6)
CHLORIDE BLD-SCNC: 97 MMOL/L (ref 99–110)
CHOLESTEROL, TOTAL: 171 MG/DL (ref 0–199)
CLARITY: CLEAR
CO2: 25 MMOL/L (ref 21–32)
COLOR: YELLOW
CREAT SERPL-MCNC: 0.6 MG/DL (ref 0.8–1.3)
CREATININE URINE: <4.2 MG/DL (ref 39–259)
EPITHELIAL CELLS, UA: 0 /HPF (ref 0–5)
GFR AFRICAN AMERICAN: >60
GFR NON-AFRICAN AMERICAN: >60
GLUCOSE BLD-MCNC: 101 MG/DL (ref 70–99)
GLUCOSE URINE: NEGATIVE MG/DL
HBA1C MFR BLD: 6.4 %
HCT VFR BLD CALC: 35.3 % (ref 40.5–52.5)
HDLC SERPL-MCNC: 48 MG/DL (ref 40–60)
HEMOGLOBIN: 11.7 G/DL (ref 13.5–17.5)
HYALINE CASTS: 0 /LPF (ref 0–8)
IRON SATURATION: 18 % (ref 20–50)
IRON: 58 UG/DL (ref 59–158)
KETONES, URINE: NEGATIVE MG/DL
LDL CHOLESTEROL CALCULATED: 91 MG/DL
LEUKOCYTE ESTERASE, URINE: NEGATIVE
MCH RBC QN AUTO: 28.3 PG (ref 26–34)
MCHC RBC AUTO-ENTMCNC: 33.1 G/DL (ref 31–36)
MCV RBC AUTO: 85.5 FL (ref 80–100)
MICROALBUMIN UR-MCNC: <1.2 MG/DL
MICROALBUMIN/CREAT UR-RTO: ABNORMAL MG/G (ref 0–30)
MICROSCOPIC EXAMINATION: YES
NITRITE, URINE: NEGATIVE
PDW BLD-RTO: 14.1 % (ref 12.4–15.4)
PH UA: 6 (ref 5–8)
PLATELET # BLD: 315 K/UL (ref 135–450)
PMV BLD AUTO: 7.9 FL (ref 5–10.5)
POTASSIUM SERPL-SCNC: 4.7 MMOL/L (ref 3.5–5.1)
PROTEIN UA: ABNORMAL MG/DL
RBC # BLD: 4.13 M/UL (ref 4.2–5.9)
RBC UA: 0 /HPF (ref 0–4)
SODIUM BLD-SCNC: 136 MMOL/L (ref 136–145)
SPECIFIC GRAVITY UA: 1.01 (ref 1–1.03)
TOTAL IRON BINDING CAPACITY: 318 UG/DL (ref 260–445)
TOTAL PROTEIN: 8.6 G/DL (ref 6.4–8.2)
TRIGL SERPL-MCNC: 158 MG/DL (ref 0–150)
URINE TYPE: ABNORMAL
UROBILINOGEN, URINE: 0.2 E.U./DL
VITAMIN D 25-HYDROXY: 47 NG/ML
VLDLC SERPL CALC-MCNC: 32 MG/DL
WBC # BLD: 6.5 K/UL (ref 4–11)
WBC UA: 0 /HPF (ref 0–5)

## 2022-06-27 PROCEDURE — 2022F DILAT RTA XM EVC RTNOPTHY: CPT | Performed by: INTERNAL MEDICINE

## 2022-06-27 PROCEDURE — 99214 OFFICE O/P EST MOD 30 MIN: CPT | Performed by: INTERNAL MEDICINE

## 2022-06-27 PROCEDURE — 1036F TOBACCO NON-USER: CPT | Performed by: INTERNAL MEDICINE

## 2022-06-27 PROCEDURE — 3044F HG A1C LEVEL LT 7.0%: CPT | Performed by: INTERNAL MEDICINE

## 2022-06-27 PROCEDURE — G8427 DOCREV CUR MEDS BY ELIG CLIN: HCPCS | Performed by: INTERNAL MEDICINE

## 2022-06-27 PROCEDURE — 83036 HEMOGLOBIN GLYCOSYLATED A1C: CPT | Performed by: INTERNAL MEDICINE

## 2022-06-27 PROCEDURE — 3017F COLORECTAL CA SCREEN DOC REV: CPT | Performed by: INTERNAL MEDICINE

## 2022-06-27 PROCEDURE — G8417 CALC BMI ABV UP PARAM F/U: HCPCS | Performed by: INTERNAL MEDICINE

## 2022-06-27 PROCEDURE — 1123F ACP DISCUSS/DSCN MKR DOCD: CPT | Performed by: INTERNAL MEDICINE

## 2022-06-27 ASSESSMENT — ENCOUNTER SYMPTOMS
DIARRHEA: 0
WHEEZING: 0
CHEST TIGHTNESS: 0
SORE THROAT: 0
BLOOD IN STOOL: 0
SHORTNESS OF BREATH: 0
CONSTIPATION: 0
RHINORRHEA: 0
COUGH: 0
VOMITING: 0
ABDOMINAL PAIN: 0
TROUBLE SWALLOWING: 0
NAUSEA: 0

## 2022-06-27 ASSESSMENT — PATIENT HEALTH QUESTIONNAIRE - PHQ9
8. MOVING OR SPEAKING SO SLOWLY THAT OTHER PEOPLE COULD HAVE NOTICED. OR THE OPPOSITE, BEING SO FIGETY OR RESTLESS THAT YOU HAVE BEEN MOVING AROUND A LOT MORE THAN USUAL: 0
4. FEELING TIRED OR HAVING LITTLE ENERGY: 1
6. FEELING BAD ABOUT YOURSELF - OR THAT YOU ARE A FAILURE OR HAVE LET YOURSELF OR YOUR FAMILY DOWN: 0
SUM OF ALL RESPONSES TO PHQ QUESTIONS 1-9: 1
1. LITTLE INTEREST OR PLEASURE IN DOING THINGS: 0
3. TROUBLE FALLING OR STAYING ASLEEP: 0
SUM OF ALL RESPONSES TO PHQ QUESTIONS 1-9: 1
5. POOR APPETITE OR OVEREATING: 0
2. FEELING DOWN, DEPRESSED OR HOPELESS: 0
7. TROUBLE CONCENTRATING ON THINGS, SUCH AS READING THE NEWSPAPER OR WATCHING TELEVISION: 0
SUM OF ALL RESPONSES TO PHQ9 QUESTIONS 1 & 2: 0
10. IF YOU CHECKED OFF ANY PROBLEMS, HOW DIFFICULT HAVE THESE PROBLEMS MADE IT FOR YOU TO DO YOUR WORK, TAKE CARE OF THINGS AT HOME, OR GET ALONG WITH OTHER PEOPLE: 0
9. THOUGHTS THAT YOU WOULD BE BETTER OFF DEAD, OR OF HURTING YOURSELF: 0

## 2022-06-27 NOTE — PROGRESS NOTES
Sekou Chavez   Date ofBirth:  1950    Date of Visit:  6/27/2022    Chief Complaint   Patient presents with    Diabetes    Hypertension    Hyperlipidemia    Gastroesophageal Reflux    Other     Iron Def.  Other     Vitamin D Def. HPI  Diabetes Mellitus Type 2:   Current Medications: Metformin 850mg 2 times daily  Diet: low [facility] blood sugar records: Patient tests once daily fasting and it averages 115  Any episodes of hypoglycemia? no  Eye exam current (within one year): no  Current exercise: walks 1-2 hours 3-4 times per week     Hypertension:    Current Medications: Lisinopril 20mg once daily  Home blood pressure monitoring: Yes - and it averages 130/70  Diet:low salt  Current exercise:  walks 1-2 hours 3-4 times per week     Hyperlipidemia:    Current medications: Lovastatin 40mg nightly  Side effects: none  Diet:  low fat, low cholesterol diet  Current exercise: walks 1-2 hours 3-4 times per week     GERD :   Current Medication: Omeprazole 40mg once daily  Patient states he seldom has heartburn and reflux. Patient states he doesn't eat spicy food. Patient states he eats some tomato based food. Patient drinks 2-3 cups of coffee per day.     Iron deficiency anemia:  Current Medication: Ferrous Sulfate 325mg 2 times daily  Patient eats red meat and green leafy vegetables. Patient denies fatigue. Patient states he is only cold in the air conditioning.     Vitamin D deficiency:   Current Medication: vitamin D 5,000 IU once daily.     Numbness and tingling:  Patient complains of numbness tingling both feet for years that is more noticeable with rest. Patient wears diabetic shoes. Patient states he sees Podiatry every 3 months. Review of Systems   Constitutional: Negative for activity change, chills, fatigue, fever and unexpected weight change. HENT: Negative for congestion, postnasal drip, rhinorrhea, sore throat and trouble swallowing.     Eyes: Negative for visual disturbance. Respiratory: Negative for cough, chest tightness, shortness of breath and wheezing. Cardiovascular: Negative for chest pain, palpitations and leg swelling. Gastrointestinal: Negative for abdominal pain, blood in stool, constipation, diarrhea, nausea and vomiting. Genitourinary: Negative for dysuria, frequency, hematuria and urgency. Musculoskeletal: Positive for arthralgias (knee arthritis and bilateral foot pains). Negative for gait problem, joint swelling and myalgias. Skin: Negative for wound. Neurological: Positive for numbness (sometimes tingling both feet for a long time. ). Negative for dizziness, syncope, light-headedness and headaches. Psychiatric/Behavioral: Negative for dysphoric mood and sleep disturbance. The patient is not nervous/anxious.         No Known Allergies  Outpatient Medications Marked as Taking for the 6/27/22 encounter (Office Visit) with Balaji Rios MD   Medication Sig Dispense Refill    lisinopril (PRINIVIL;ZESTRIL) 20 MG tablet TAKE 1 TABLET EVERY DAY 90 tablet 1    metFORMIN (GLUCOPHAGE) 850 MG tablet TAKE 1 TABLET TWICE DAILY WITH MEALS 180 tablet 1    lovastatin (MEVACOR) 40 MG tablet TAKE 1 TABLET EVERY NIGHT 90 tablet 1    ACCU-CHEK SMARTVIEW strip TEST BLOOD SUGAR EVERY  strip 3    diclofenac sodium (VOLTAREN) 1 % GEL Apply 2 g topically 2 times daily 300 g 1    ferrous sulfate 325 (65 Fe) MG tablet Take 1 tablet by mouth daily (with breakfast)      Blood Glucose Calibration (ACCU-CHEK SMARTVIEW CONTROL) LIQD Use as directed 1 each 3    ACCU-CHEK FASTCLIX LANCETS MISC 1 each by Does not apply route daily 100 each 3    magnesium oxide (MAG-OX) 400 MG tablet Take 400 mg by mouth daily      Alcohol Swabs (B-D SINGLE USE SWABS REGULAR) PADS USE AS DIRECTED 200 each 3    HOMEOPATHIC PRODUCTS EX Apply topically Indications: Gold Bond Diabetic Cream      Blood Glucose Monitoring Suppl (ACCU-CHEK SOTERO SMARTVIEW) W/DEVICE KIT Use as directed 1 kit 0    acetaminophen (TYLENOL) 325 MG tablet Take 650 mg by mouth every 6 hours as needed for Pain.  vitamin D-3 (CHOLECALCIFEROL) 5000 UNITS TABS Take 1 tablet by mouth daily. 30 tablet 5    omeprazole (PRILOSEC) 40 MG capsule Take 40 mg by mouth daily.  Glucosamine 500 MG CAPS Take  by mouth daily.  Multiple Vitamins-Minerals (MULTIVITAMIN PO) Take  by mouth. Vitals:    06/27/22 0947   BP: 124/76   Pulse: 70   Resp: 16   Temp: 97.9 °F (36.6 °C)   SpO2: 98%   Weight: 200 lb 9.6 oz (91 kg)   Height: 5' 6\" (1.676 m)     Body mass index is 32.38 kg/m². Physical Exam  Nursing note reviewed. Constitutional:       General: He is not in acute distress. Appearance: Normal appearance. He is well-developed. Eyes:      General: Lids are normal.      Extraocular Movements: Extraocular movements intact. Conjunctiva/sclera: Conjunctivae normal.      Pupils: Pupils are equal, round, and reactive to light. Neck:      Thyroid: No thyromegaly. Vascular: No carotid bruit. Cardiovascular:      Rate and Rhythm: Normal rate and regular rhythm. Pulses: Normal pulses. Heart sounds: Normal heart sounds, S1 normal and S2 normal. No murmur heard. No friction rub. No gallop. Pulmonary:      Effort: Pulmonary effort is normal. No respiratory distress. Breath sounds: Normal breath sounds. No wheezing, rhonchi or rales. Abdominal:      General: Bowel sounds are normal. There is no distension. Palpations: Abdomen is soft. Tenderness: There is no abdominal tenderness. Musculoskeletal:      Cervical back: Neck supple. Right lower leg: No edema. Left lower leg: No edema. Lymphadenopathy:      Head:      Right side of head: No submandibular adenopathy. Left side of head: No submandibular adenopathy. Skin:     Comments: No foot ulcers     Neurological:      Mental Status: He is alert.       Comments: Bilateral foot monofilament exam normal   Psychiatric:         Mood and Affect: Mood normal.           Results for POC orders placed in visit on 06/27/22   POCT glycosylated hemoglobin (Hb A1C)   Result Value Ref Range    Hemoglobin A1C 6.4 %     Lab Review   No visits with results within 6 Month(s) from this visit.    Latest known visit with results is:   Orders Only on 12/27/2021   Component Date Value    Iron 12/27/2021 60     TIBC 12/27/2021 332     Iron Saturation 12/27/2021 18*    Vit D, 25-Hydroxy 12/27/2021 35.0     WBC 12/27/2021 6.2     RBC 12/27/2021 4.21     Hemoglobin 12/27/2021 11.5*    Hematocrit 12/27/2021 36.7*    MCV 12/27/2021 87.2     MCH 12/27/2021 27.4     MCHC 12/27/2021 31.5     RDW 12/27/2021 15.1     Platelets 29/30/1151 254     MPV 12/27/2021 8.6     Neutrophils % 12/27/2021 57.7     Lymphocytes % 12/27/2021 34.6     Monocytes % 12/27/2021 6.4     Eosinophils % 12/27/2021 0.8     Basophils % 12/27/2021 0.5     Neutrophils Absolute 12/27/2021 3.6     Lymphocytes Absolute 12/27/2021 2.1     Monocytes Absolute 12/27/2021 0.4     Eosinophils Absolute 12/27/2021 0.0     Basophils Absolute 12/27/2021 0.0     Cholesterol, Total 12/27/2021 157     Triglycerides 12/27/2021 159*    HDL 12/27/2021 46     LDL Calculated 12/27/2021 79     VLDL Cholesterol Calcula* 12/27/2021 32     Sodium 12/27/2021 138     Potassium 12/27/2021 4.6     Chloride 12/27/2021 100     CO2 12/27/2021 21     Anion Gap 12/27/2021 17*    Glucose 12/27/2021 126*    BUN 12/27/2021 11     CREATININE 12/27/2021 0.5*    GFR Non- 12/27/2021 >60     GFR  12/27/2021 >60     Calcium 12/27/2021 10.0     Total Protein 12/27/2021 8.5*    Albumin 12/27/2021 4.5     Albumin/Globulin Ratio 12/27/2021 1.1     Total Bilirubin 12/27/2021 <0.2     Alkaline Phosphatase 12/27/2021 44     ALT 12/27/2021 34     AST 12/27/2021 25     Rejected Test 12/27/2021 UMACR UA UMIC     Reason for Rejection 12/27/2021 see below          Assessment/Plan     1. Type 2 diabetes mellitus with complication, without long-term current use of insulin ([facility])  -Hemoglobin A1c of 6.4% shows diabetes is well controlled  -Continue  Metformin 850mg 2 times daily  -Limit carbohydrates to 45 grams with meals and 15 grams with snacks  -monitor blood sugars  -goal for blood sugar fasting or pre-meal  is   -goal for blood sugar 2 hours after a meal is less than 180  -goal for blood sugar at bedtime is less than 150  -Regular aerobic exercise  - POCT glycosylated hemoglobin (Hb A1C)  -  DIABETES FOOT EXAM  - [facility] - Charles Jenkins MD, (Comprehensive Ophthalmology, Cataract Surgery, LASIK and [address]) Ophthalmology, [place]    2. Essential hypertension  -stable  -Continue Lisinopril 20mg once daily  -Low sodium diet  -Regular aerobic exercise    3. Mixed hyperlipidemia  - stable  -Continue Lovastatin 40mg nightly  -Low fat, low cholesterol diet  -Regular aerobic exercise  - Lipid Panel; Future  - Hepatic Function Panel; Future    4. Gastroesophageal reflux disease, unspecified whether esophagitis present  -stable  -Continue Omeprazole 40mg once daily   -Decrease caffeine, avoid spicy foods, avoid tomato based foods  -Eat small meals instead of large meals  -Wait 2-3 hours after eating before lying down     5. Iron deficiency anemia, unspecified iron deficiency anemia type  -stable  -continue Ferrous Sulfate 325mg 2 times daily  - Iron and TIBC; Future  - CBC; Future    6. Vitamin D deficiency  -Stable  -Continue vitamin D 5000 IU once daily  -Vitamin D 25 Hydroxy; Future    7. Diabetic polyneuropathy associated with type 2 diabetes mellitus ([facility] [address]  -Continue diabetic shoes  -Continue care per Podiatry      Discussed medications with patient, who voiced understanding of their use and indications. All questions answered. Return in about 6 months (around 12/28/2022) for Medicare AWV.

## 2022-11-27 DIAGNOSIS — E78.2 MIXED HYPERLIPIDEMIA: ICD-10-CM

## 2022-11-28 RX ORDER — LOVASTATIN 40 MG/1
TABLET ORAL
Qty: 90 TABLET | Refills: 1 | Status: SHIPPED | OUTPATIENT
Start: 2022-11-28

## 2022-11-28 NOTE — TELEPHONE ENCOUNTER
Medication:   Requested Prescriptions     Pending Prescriptions Disp Refills    lovastatin (MEVACOR) 40 MG tablet [Pharmacy Med Name: LOVASTATIN 40 MG Tablet] 90 tablet 1     Sig: TAKE 1 TABLET EVERY NIGHT     Last Filled:  6.22.22    Last appt: 6/27/2022   Next appt: 12/28/2022    Last OARRS: No flowsheet data found.

## 2022-12-21 SDOH — HEALTH STABILITY: PHYSICAL HEALTH: ON AVERAGE, HOW MANY MINUTES DO YOU ENGAGE IN EXERCISE AT THIS LEVEL?: 30 MIN

## 2022-12-21 SDOH — HEALTH STABILITY: PHYSICAL HEALTH: ON AVERAGE, HOW MANY DAYS PER WEEK DO YOU ENGAGE IN MODERATE TO STRENUOUS EXERCISE (LIKE A BRISK WALK)?: 4 DAYS

## 2022-12-21 ASSESSMENT — LIFESTYLE VARIABLES
HOW OFTEN DO YOU HAVE SIX OR MORE DRINKS ON ONE OCCASION: 1
HOW MANY STANDARD DRINKS CONTAINING ALCOHOL DO YOU HAVE ON A TYPICAL DAY: 0
HOW MANY STANDARD DRINKS CONTAINING ALCOHOL DO YOU HAVE ON A TYPICAL DAY: PATIENT DOES NOT DRINK
HOW OFTEN DO YOU HAVE A DRINK CONTAINING ALCOHOL: NEVER
HOW OFTEN DO YOU HAVE A DRINK CONTAINING ALCOHOL: 1

## 2022-12-21 ASSESSMENT — PATIENT HEALTH QUESTIONNAIRE - PHQ9
2. FEELING DOWN, DEPRESSED OR HOPELESS: 0
SUM OF ALL RESPONSES TO PHQ9 QUESTIONS 1 & 2: 0
SUM OF ALL RESPONSES TO PHQ QUESTIONS 1-9: 0
1. LITTLE INTEREST OR PLEASURE IN DOING THINGS: 0

## 2022-12-28 ENCOUNTER — OFFICE VISIT (OUTPATIENT)
Dept: PRIMARY CARE CLINIC | Age: 72
End: 2022-12-28
Payer: MEDICARE

## 2022-12-28 VITALS
HEIGHT: 66 IN | WEIGHT: 204 LBS | OXYGEN SATURATION: 97 % | SYSTOLIC BLOOD PRESSURE: 118 MMHG | TEMPERATURE: 98.1 F | BODY MASS INDEX: 32.78 KG/M2 | HEART RATE: 68 BPM | RESPIRATION RATE: 16 BRPM | DIASTOLIC BLOOD PRESSURE: 82 MMHG

## 2022-12-28 DIAGNOSIS — D50.9 IRON DEFICIENCY ANEMIA, UNSPECIFIED IRON DEFICIENCY ANEMIA TYPE: ICD-10-CM

## 2022-12-28 DIAGNOSIS — I10 ESSENTIAL HYPERTENSION: ICD-10-CM

## 2022-12-28 DIAGNOSIS — E11.42 DIABETIC POLYNEUROPATHY ASSOCIATED WITH TYPE 2 DIABETES MELLITUS (HCC): ICD-10-CM

## 2022-12-28 DIAGNOSIS — E78.2 MIXED HYPERLIPIDEMIA: ICD-10-CM

## 2022-12-28 DIAGNOSIS — E11.8 TYPE 2 DIABETES MELLITUS WITH COMPLICATION, WITHOUT LONG-TERM CURRENT USE OF INSULIN (HCC): ICD-10-CM

## 2022-12-28 DIAGNOSIS — E55.9 VITAMIN D DEFICIENCY: ICD-10-CM

## 2022-12-28 DIAGNOSIS — K21.9 GASTROESOPHAGEAL REFLUX DISEASE, UNSPECIFIED WHETHER ESOPHAGITIS PRESENT: ICD-10-CM

## 2022-12-28 DIAGNOSIS — Z00.00 MEDICARE ANNUAL WELLNESS VISIT, SUBSEQUENT: Primary | ICD-10-CM

## 2022-12-28 DIAGNOSIS — Z23 NEED FOR INFLUENZA VACCINATION: ICD-10-CM

## 2022-12-28 LAB
A/G RATIO: 1.1 (ref 1.1–2.2)
ALBUMIN SERPL-MCNC: 4.3 G/DL (ref 3.4–5)
ALP BLD-CCNC: 51 U/L (ref 40–129)
ALT SERPL-CCNC: 22 U/L (ref 10–40)
ANION GAP SERPL CALCULATED.3IONS-SCNC: 14 MMOL/L (ref 3–16)
AST SERPL-CCNC: 18 U/L (ref 15–37)
BILIRUB SERPL-MCNC: <0.2 MG/DL (ref 0–1)
BUN BLDV-MCNC: 13 MG/DL (ref 7–20)
CALCIUM SERPL-MCNC: 10.1 MG/DL (ref 8.3–10.6)
CHLORIDE BLD-SCNC: 98 MMOL/L (ref 99–110)
CHOLESTEROL, TOTAL: 144 MG/DL (ref 0–199)
CO2: 25 MMOL/L (ref 21–32)
CREAT SERPL-MCNC: 0.6 MG/DL (ref 0.8–1.3)
GFR SERPL CREATININE-BSD FRML MDRD: >60 ML/MIN/{1.73_M2}
GLUCOSE BLD-MCNC: 106 MG/DL (ref 70–99)
HBA1C MFR BLD: 6.5 %
HCT VFR BLD CALC: 34.5 % (ref 40.5–52.5)
HDLC SERPL-MCNC: 46 MG/DL (ref 40–60)
HEMOGLOBIN: 11.2 G/DL (ref 13.5–17.5)
IRON SATURATION: 15 % (ref 20–50)
IRON: 54 UG/DL (ref 59–158)
LDL CHOLESTEROL CALCULATED: 70 MG/DL
MCH RBC QN AUTO: 27.5 PG (ref 26–34)
MCHC RBC AUTO-ENTMCNC: 32.5 G/DL (ref 31–36)
MCV RBC AUTO: 84.6 FL (ref 80–100)
PDW BLD-RTO: 14.6 % (ref 12.4–15.4)
PLATELET # BLD: 323 K/UL (ref 135–450)
PMV BLD AUTO: 8.8 FL (ref 5–10.5)
POTASSIUM SERPL-SCNC: 4.6 MMOL/L (ref 3.5–5.1)
RBC # BLD: 4.07 M/UL (ref 4.2–5.9)
SODIUM BLD-SCNC: 137 MMOL/L (ref 136–145)
TOTAL IRON BINDING CAPACITY: 367 UG/DL (ref 260–445)
TOTAL PROTEIN: 8.2 G/DL (ref 6.4–8.2)
TRIGL SERPL-MCNC: 140 MG/DL (ref 0–150)
VITAMIN D 25-HYDROXY: 43.2 NG/ML
VLDLC SERPL CALC-MCNC: 28 MG/DL
WBC # BLD: 7 K/UL (ref 4–11)

## 2022-12-28 PROCEDURE — G0008 ADMIN INFLUENZA VIRUS VAC: HCPCS | Performed by: INTERNAL MEDICINE

## 2022-12-28 PROCEDURE — 3078F DIAST BP <80 MM HG: CPT | Performed by: INTERNAL MEDICINE

## 2022-12-28 PROCEDURE — 3017F COLORECTAL CA SCREEN DOC REV: CPT | Performed by: INTERNAL MEDICINE

## 2022-12-28 PROCEDURE — G8484 FLU IMMUNIZE NO ADMIN: HCPCS | Performed by: INTERNAL MEDICINE

## 2022-12-28 PROCEDURE — 3044F HG A1C LEVEL LT 7.0%: CPT | Performed by: INTERNAL MEDICINE

## 2022-12-28 PROCEDURE — 90694 VACC AIIV4 NO PRSRV 0.5ML IM: CPT | Performed by: INTERNAL MEDICINE

## 2022-12-28 PROCEDURE — 3074F SYST BP LT 130 MM HG: CPT | Performed by: INTERNAL MEDICINE

## 2022-12-28 PROCEDURE — 83036 HEMOGLOBIN GLYCOSYLATED A1C: CPT | Performed by: INTERNAL MEDICINE

## 2022-12-28 PROCEDURE — G0439 PPPS, SUBSEQ VISIT: HCPCS | Performed by: INTERNAL MEDICINE

## 2022-12-28 PROCEDURE — 1123F ACP DISCUSS/DSCN MKR DOCD: CPT | Performed by: INTERNAL MEDICINE

## 2022-12-28 RX ORDER — GABAPENTIN 100 MG/1
200 CAPSULE ORAL NIGHTLY
Qty: 60 CAPSULE | Refills: 0 | Status: SHIPPED | OUTPATIENT
Start: 2022-12-28 | End: 2023-01-27

## 2022-12-28 SDOH — ECONOMIC STABILITY: FOOD INSECURITY: WITHIN THE PAST 12 MONTHS, YOU WORRIED THAT YOUR FOOD WOULD RUN OUT BEFORE YOU GOT MONEY TO BUY MORE.: NEVER TRUE

## 2022-12-28 SDOH — ECONOMIC STABILITY: FOOD INSECURITY: WITHIN THE PAST 12 MONTHS, THE FOOD YOU BOUGHT JUST DIDN'T LAST AND YOU DIDN'T HAVE MONEY TO GET MORE.: NEVER TRUE

## 2022-12-28 ASSESSMENT — SOCIAL DETERMINANTS OF HEALTH (SDOH): HOW HARD IS IT FOR YOU TO PAY FOR THE VERY BASICS LIKE FOOD, HOUSING, MEDICAL CARE, AND HEATING?: NOT HARD AT ALL

## 2022-12-28 NOTE — LETTER
CONTROLLED SUBSTANCE MEDICATION AGREEMENT     Patient Name: Tamia Lane  Patient YOB: 1950   I understand, that controlled substance medications may be used to help better manage my symptoms and to improve my ability to function at home, work and in social settings. However, I also understand that these medications do have risks, which have been discussed with me, including possible development of physical or psychological dependence. I understand that successful treatment requires mutual trust and honesty between me and my provider. I understand and agree that following this Medication Agreement is necessary in continuing my provider-patient relationship and the success of my treatment plan. Explanation from my Provider: Benefits and Goals of Controlled Substance Medications: There are two potential goals for your treatment: (1) decreased pain and suffering (2) improved daily life functions. There are many possible treatments for your chronic condition(s). Alternatives such as physical therapy, yoga, massage, home daily exercise, meditation, relaxation techniques, injections, chiropractic manipulations, surgery, cognitive therapy, hypnosis and many medications that are not habit-forming may be used. Use of controlled substance medications may be helpful, but they are unlikely to resolve all symptoms or restore all function. Explanation from my Provider: Risks of Controlled Substance Medications:  Opioid pain medications: These medications can lead to problems such as addiction/dependence, sedation, lightheadedness/dizziness, memory issues, falls, constipation, nausea, or vomiting. They may also impair the ability to drive or operate machinery. Additionally, these medications may lower testosterone levels, leading to loss of bone strength, stamina and sex drive.   They may cause problems with breathing, sleep apnea and reduced coughing, which is especially dangerous for patients with lung disease. Overdose or dangerous interactions with alcohol and other medications may occur, leading to death. Hyperalgesia may develop, which means patients receiving opioids for the treatment of pain may become more sensitive to certain painful stimuli, and in some cases, experience pain from ordinarily non-painful stimuli. Women between the ages of 14-53 who could become pregnant should carefully weigh the risks and benefits of opioids with their physicians, as these medications increase the risk of pregnancy complications, including miscarriage,  delivery and stillbirth. It is also possible for babies to be born addicted to opioids. Opioid dependence withdrawal symptoms may include; feelings of uneasiness, increased pain, irritability, belly pain, diarrhea, sweats and goose-flesh. Benzodiazepines and non-benzodiazepine sleep medications: These medications can lead to problems such as addiction/dependence, sedation, fatigue, lightheadedness, dizziness, incoordination, falls, depression, hallucinations, and impaired judgment, memory and concentration. The ability to drive and operate machinery may also be affected. Abnormal sleep-related behaviors have been reported, including sleepwalking, driving, making telephone calls, eating, or having sex while not fully awake. These medications can suppress breathing and worsen sleep apnea, particularly when combined with alcohol or other sedating medications, potentially leading to death. Dependence withdrawal symptoms may include tremors, anxiety, hallucinations and seizures. Stimulants:  Common adverse effects include addiction/dependence, increased blood  pressure and heart rate, decreased appetite, nausea, involuntary weight loss, insomnia,                                                                                                                     Initials:_______   irritability, and headaches.   These risks may increase when these medications are combined with other stimulants, such as caffeine pills or energy drinks, certain weight loss supplements and oral decongestants. Dependence withdrawal symptoms may include depressed mood, loss of interest, suicidal thoughts, anxiety, fatigue, appetite changes and agitation. Testosterone replacement therapy:  Potential side effects include increased risk of stroke and heart attack, blood clots, increased blood pressure, increased cholesterol, enlarged prostate, sleep apnea, irritability/aggression and other mood disorders, and decreased fertility. I agree and understand that I and my prescriber have the following rights and responsibilities regarding my treatment plan:     1. MY RIGHTS:  To be informed of my treatment and medication plan. To be an active participant in my health and wellbeing. 2. MY RESPONSIBILITY AND UNDERSTANDING FOR USE OF MEDICATIONS   I will take medications at the dose and frequency as directed. For my safety, I will not increase or change how I take my medications without the recommendation of my healthcare provider.  I will actively participate in any program recommended by my provider which may improve function, including social, physical, psychological programs.  I will not take my medications with alcohol or other drugs not prescribed to me. I understand that drinking alcohol with my medications increases the chances of side effects, including reduced breathing rate and could lead to personal injury when operating machinery.  I understand that if I have a history of substance use disorders, including alcohol or other illicit drugs, that I may be at increased risk of addiction to my medications.  I agree to notify my provider immediately if I should become pregnant so that my treatment plan can be adjusted.    I agree and understand that I shall only receive controlled substance medications from the prescriber that signed this agreement unless there is written agreement among other prescribers of controlled substances outlining the responsibility of the medications being prescribed.  I understand that the if the controlled medication is not helping to achieve goals, the dosage may be tapered and no longer prescribed. 3. MY RESPONSIBILITY FOR COMMUNICATION / PRESCRIPTION RENEWALS   I agree that all controlled substance medications that I take will be prescribed only by my provider. If another healthcare provider prescribes me medication in an emergency, I will notify my provider within seventy-two (72) hours.  I will arrange for refills at the prescribed interval ONLY during regular office hours. I will not ask for refills earlier than agreed, after-hours, on holidays or weekends. Refills may take up to 72 hours for processing and prescriptions to reach the pharmacy.  I will inform my other health care providers that I am taking these medications and of the existence of this [id number]. In the event of an emergency, I will provide the same information to the emergency department prescribers.  I will keep my provider updated on the pharmacy I am using for controlled medication prescription filling. Initials:_______  4. MY RESPONSIBILITY FOR PROTECTING MEDICATIONS   I will protect my prescriptions and medications. I understand that lost or misplaced prescriptions will not be replaced.  I will keep medications only for my own use and will not share them with others. I will keep all medications away from children.  I agree that if my medications are adjusted or discontinued, I will properly dispose of any remaining medications. I understand that I will be required to dispose of any remaining controlled medications as, directed by my prescriber, prior to being provided with any prescriptions for other controlled medications.   Medication drop box locations can be found at: [email or web address]    5. MY RESPONSIBILITY WITH ILLEGAL DRUGS    I will not use illegal or street drugs or another person's prescription medications not prescribed to me.  If there are identified addiction type symptoms, then referral to a program may be provided by my provider and I agree to follow through with this recommendation. 6. MY RESPONSIBILITY FOR COOPERATION WITH INVESTIGATIONS   I understand that my provider will comply with any applicable law and may discuss my use and/or possible misuse/abuse of controlled substances and alcohol, as appropriate, with any health care provider involved in my care, pharmacist, or legal authority.  I authorize my provider and pharmacy to cooperate fully with law enforcement agencies (as permitted by law) in the investigation of any possible misuse, sale, or other diversion of my controlled substances.  I agree to waive any applicable privilege or right of privacy or confidentiality with respect to these authorizations. 7. PROVIDERS RIGHT TO MONITOR FOR SAFETY: PRESCRIPTION MONITORING / DRUG TESTING   I consent to drug/toxicology screening and will submit to a drug screen upon my providers request to assure I am only taking the prescribed drugs for my safety monitoring. I understand that a drug screen is a laboratory test in which a sample of my urine, blood or saliva is checked to see what drugs I have been taking. This may entail an observed urine specimen, which means that a nurse or other health care provider may watch me provide urine, and I will cooperate if I am asked to provide an observed specimen.  I understand that my provider will check a copy of my State Prescription Monitoring Program () Report in order to safely prescribe medications.  Pill Counts: I consent to pill counts when requested.   I may be asked to bring all my prescribed controlled substance medications, in their original bottles, to all of my scheduled appointments. In addition, my provider may ask me to come to the practice at any time for a random pill count. 8. TERMINATION OF THIS AGREEMENT  For my safety, my prescriber has the right to stop prescribing controlled substance medications and may end this agreement. Initials:_______   Conditions that may result in termination of this agreement:  a. I do not show any improvement in pain, or my activity has not improved. b. I develop rapid tolerance or loss of improvement, as described in my treatment plan.  c. I develop significant side effects from the medication. d. My behavior is not consistent with the responsibilities outlined above, thereby causing safety concerns to continue prescribing controlled substance medications. e. I fail to follow the terms of this agreement. f. Other:____________________________       UNDERSTANDING THIS MEDICATION AGREEMENT:    I have read the above and have had all my questions answered. For chronic disease management, I know that my symptoms can be managed with many types of treatments. A chronic medication trial may be part of my treatment, but I must be an active participant in my care. Medication therapy is only one part of my symptom management plan. In some cases, there may be limited scientific evidence to support the chronic use of certain medications to improve symptoms and daily function. Furthermore, in certain circumstances, there may be scientific information that suggests that the use of chronic controlled substances may worsen my symptoms and increase my risk of unintentional death directly related to this medication therapy. I know that if my provider feels my risk from controlled medications is greater than my benefit, I will have my controlled substance medication(s) compassionately lowered or removed altogether.      I further agree to allow this office to contact my HIPAA contact if there are concerns about my safety and use of the controlled medications. I have agreed to use the prescribed controlled substance medications to me as instructed by my provider and as stated in this Medication Agreement. My initial on each page and my signature below shows that I have read each page and I have had the opportunity to ask questions with answers provided by my provider.     Patient Name (Printed): _____________________________________  Patient Signature:  ______________________   Date: _____________    Prescriber Name (Printed): Cadence Vinson MD___________________________________  Prescriber Signature: _____________________  Date: 12/28/22_____________

## 2022-12-28 NOTE — PROGRESS NOTES
Medicare Annual Wellness Visit    Felice Mcgregor is here for Medicare AWV    Assessment & Plan   1. Medicare annual wellness visit, subsequent  -Medicare AWV done    2. Type 2 diabetes mellitus with complication, without long-term current use of insulin ([facility])  - Hemoglobin A1c of 6.5% shows diabetes is controlled  -Continue  Metformin 850mg 2 times daily  -Limit carbohydrates to 45 grams with meals and 15 grams with snacks  -monitor blood sugars  -goal for blood sugar fasting or pre-meal  is   -goal for blood sugar 2 hours after a meal is less than 180  -goal for blood sugar at bedtime is less than 150  -Regular aerobic exercise  -POCT glycosylated hemoglobin (Hb A1C)  -Comprehensive Metabolic Panel; Future    3. Essential hypertension  -stable  -Continue Lisinopril 20mg once daily  -Low sodium diet  -Regular aerobic exercise  -Comprehensive Metabolic Panel; Future    4. Mixed hyperlipidemia  - stable  -Continue Lovastatin 40mg nightly  -Low fat, low cholesterol diet  -Regular aerobic exercise  -Comprehensive Metabolic Panel; Future  -Lipid Panel; Future    5. Gastroesophageal reflux disease, unspecified whether esophagitis present  -stable  -Continue Omeprazole 40mg once daily   -Decrease caffeine, avoid spicy foods, avoid tomato based foods  -Eat small meals instead of large meals  -Wait 2-3 hours after eating before lying down     6. Iron deficiency anemia, unspecified iron deficiency anemia type  -stable  -continue Ferrous Sulfate 325mg 2 times daily  - CBC; Future  - Iron and TIBC; Future    7. Vitamin D deficiency  -Stable  -Continue vitamin D 5000 IU once daily  -Vitamin D 25 Hydroxy; Future    8. Diabetic polyneuropathy associated with type 2 diabetes mellitus ([facility] [address]  -Start gabapentin (NEURONTIN) 100 MG capsule; Take 2 capsules by mouth at bedtime for 30 days. Dispense: 60 capsule; Refill: 0    9.  Need for influenza vaccination  - Influenza, FLUAD, (age 72 y+), IM, Preservative Free, 0.5 mL given          Recommendations for Preventive Services Due: see orders and patient instructions/AVS.  Recommended screening schedule for the next 5-10 years is provided to the patient in written form: see Patient Instructions/AVS.          Return in 4 weeks (on 1/25/2023) for diabetic neuropathy. Subjective   The following acute and/or chronic problems were also addressed today:    Diabetes Mellitus Type 2:   Current Medications: Metformin 850mg 2 times daily  Diet: low carbohydrate diet  Home blood sugar records: Patient tests once daily fasting and it averages 115-125  Any episodes of hypoglycemia? no  Eye exam current (within one year): yes on 8/9/22  Current exercise: walks on treadmill 30 minutes 2-3 times per week     Hypertension:    Current Medications: Lisinopril 20mg once daily  Home blood pressure monitoring: Yes - and it averages 120-130/70-80  Diet:low salt  Current exercise:  walks on treadmill 30 minutes 2-3 times per week     Hyperlipidemia:    Current medications: Lovastatin 40mg nightly  Side effects: none  Diet:  low fat, low cholesterol diet  Current exercise: walks on treadmill 30 minutes 2-3 times per week     GERD :   Current Medication: Omeprazole 40mg once daily  Patient states he doesn't have a lot of heartburn or reflux if watches what he eats. Patient states he doesn't eat spicy food. Patient states he eats a little tomato based food. Patient drinks 2-3 cups of coffee per day. Iron deficiency anemia:  Current Medication: Ferrous Sulfate 325mg once daily. Patient eats green leafy vegetables and red meat. Vitamin D deficiency:   Current Medication: vitamin D 5,000 IU once daily. Diabetic neuropathy:  Patient has numbness and tingling both feet that is worse with resting. Patient wears diabetic shoes. Patient sees Podiatry. Patient's complete Health Risk Assessment and screening values have been reviewed and are found in Flowsheets.  The following problems were reviewed today and where indicated follow up appointments were made and/or referrals ordered. Positive Risk Factor Screenings with Interventions:                 Weight and Activity:  Physical Activity: Insufficiently Active    Days of Exercise per Week: 4 days    Minutes of Exercise per Session: 30 min     On average, how many days per week do you engage in moderate to strenuous exercise (like a brisk walk)?: 4 days  Have you lost any weight without trying in the past 3 months?: No  Body mass index: (!) 32.92  Obesity Interventions:  Patient declines any further evaluation or treatment             Safety:  Do you have either shower bars, grab bars, non-slip mats or non-slip surfaces in your shower or bathtub?: (!) No  Interventions:  Patient declined any further interventions or treatment     Advanced Directives:  Do you have a Living Will?: (!) No    Intervention:  has NO advanced directive - information provided                       Objective   Vitals:    12/28/22 0926   BP: 118/82   Pulse: 68   Resp: 16   Temp: 98.1 °F (36.7 °C)   SpO2: 97%   Weight: 204 lb (92.5 kg)   Height: 5' 6\" (1.676 m)      Body mass index is 32.93 kg/m².       General Appearance: alert and oriented to person, place and time, well-developed and well-nourished, in no acute distress  Head: normocephalic and atraumatic  Eyes: pupils equal, round, and reactive to light, extraocular eye movements intact, conjunctivae normal  ENT: tympanic membrane, external ear and ear canal normal bilaterally, oropharynx clear and moist with normal mucous membranes  Neck: neck supple and non tender without mass, no thyromegaly or thyroid nodules, no cervical lymphadenopathy   Pulmonary/Chest: clear to auscultation bilaterally- no wheezes, rales or rhonchi, normal air movement, no respiratory distress  Cardiovascular: normal rate, regular rhythm, normal S1 and S2, no murmurs, and no carotid bruits  Abdomen: soft, non-tender, non-distended, normal bowel sounds, no masses or organomegaly  Extremities: no edema  Neurologic: gait and coordination normal and speech normal       No Known Allergies  Prior to Visit Medications    Medication Sig Taking? Authorizing Provider   lovastatin (MEVACOR) 40 MG tablet TAKE 1 TABLET EVERY NIGHT Yes Refugio Conn MD   lisinopril (PRINIVIL;ZESTRIL) 10 MG tablet Take 1 tablet by mouth daily TAKE 1 TABLET EVERY DAY Yes Sidra Flynn MD   metFORMIN (GLUCOPHAGE) 850 MG tablet TAKE 1 TABLET TWICE DAILY WITH MEALS Yes Refugio Conn MD   ACCU-CHEK SMARTVIEW strip TEST BLOOD SUGAR EVERY DAY Yes Refugio Conn MD   diclofenac sodium (VOLTAREN) 1 % GEL Apply 2 g topically 2 times daily Yes Refugio Conn MD   ferrous sulfate 325 (65 Fe) MG tablet Take 1 tablet by mouth daily (with breakfast) Yes Refugio Conn MD   Blood Glucose Calibration (ACCU-CHEK SMARTVIEW CONTROL) LIQD Use as directed Yes Refugio Conn MD   ACCU-CHEK FASTCLIX LANCETS MISC 1 each by Does not apply route daily Yes Refugio Conn MD   magnesium oxide (MAG-OX) 400 MG tablet Take 400 mg by mouth daily Yes Historical Provider, MD   Alcohol Swabs (B-D SINGLE USE SWABS REGULAR) PADS USE AS DIRECTED Yes Refugio Conn MD   HOMEOPATHIC PRODUCTS EX Apply topically Indications: Gold Bond Diabetic Cream Yes Historical Provider, MD   Blood Glucose Monitoring Suppl (ACCU-CHEK SOTERO SMARTVIEW) W/DEVICE KIT Use as directed Yes Refugio Conn MD   acetaminophen (TYLENOL) 325 MG tablet Take 650 mg by mouth every 6 hours as needed for Pain. Yes Historical Provider, MD   vitamin D-3 (CHOLECALCIFEROL) 5000 UNITS TABS Take 1 tablet by mouth daily. Yes Refugio Conn MD   omeprazole (PRILOSEC) 40 MG delayed release capsule Take 40 mg by mouth daily. Yes Historical Provider, MD   Glucosamine 500 MG CAPS Take  by mouth daily. Yes Historical Provider, MD   Multiple Vitamins-Minerals (MULTIVITAMIN PO) Take  by mouth.  Yes Historical Provider, MD       Lab Review     Results for POC orders placed in visit on 12/28/22   POCT glycosylated hemoglobin (Hb A1C)   Result Value Ref Range    Hemoglobin A1C 6.5 %       Orders Only on 08/05/2022   Component Date Value    Visual Acuity Distance R* 08/05/2022 20/30     Visual Acuity Distance L* 08/05/2022 20/40     Intraocular Pressure Eye 08/05/2022                      Value:15  13      Diabetic Retinopathy 08/05/2022 NEGATIVE     Cataracts 08/05/2022 POSITIVE     Glaucoma 08/05/2022 NEGATIVE      Lab Results   Component Value Date/Time     06/27/2022    K 4.7 06/27/2022    CL 97 06/27/2022    CO2 25 06/27/2022    BUN 14 06/27/2022    CREATININE 0.6 06/27/2022    GLUCOSE 101 06/27/2022    CALCIUM 10.1 06/27/2022      Lab Results   Component Value Date    CHOL 171 06/27/2022    TRIG 158 06/27/2022    HDL 48 06/27/2022    LDLCALC 91 06/27/2022     Lab Results   Component Value Date/Time    ALKPHOS 50 06/27/2022    ALT 22 06/27/2022    AST 20 06/27/2022    PROT 8.6 06/27/2022    BILITOT 0.3 06/27/2022    BILIDIR <0.2 06/27/2022    LABALBU 4.7 06/27/2022     Lab Results   Component Value Date    IRON 58 (L) 06/27/2022    TIBC 318 06/27/2022     Lab Results   Component Value Date/Time    WBC 6.5 06/27/2022    RBC 4.13 06/27/2022    HGB 11.7 06/27/2022    HCT 35.3 06/27/2022    MCV 85.5 06/27/2022    MCH 28.3 06/27/2022    MCHC 33.1 06/27/2022    RDW 14.1 06/27/2022     06/27/2022    MPV 7.9 06/27/2022     Lab Results   Component Value Date    VITD25 47.0 06/27/2022      Lab Results   Component Value Date    LABMICR YES 06/27/2022    LABMICR <1.20 06/27/2022         CareTeam (Including outside providers/suppliers regularly involved in providing care):   Patient Care Team:  Odessa Loya MD as PCP - General (Internal Medicine)  Odessa Loya MD as PCP - [facility] Empaneled Provider  Chelsey Long MD as Surgeon (General Surgery)  Francisca Tyson MD as Consulting Physician (Gastroenterology)     Reviewed and updated this visit:  Tobacco  Allergies  Meds  Med Hx  Surg Hx  Soc Hx  Fam Hx           Controlled Substance Monitoring:    Acute and Chronic Pain Monitoring:   RX Monitoring 12/28/2022   Periodic Controlled Substance Monitoring No signs of potential drug abuse or diversion identified.

## 2022-12-28 NOTE — PATIENT INSTRUCTIONS
Advance Directives: Care Instructions  Overview  An advance directive is a legal way to state your wishes at the end of your life. It tells your family and your doctor what to do if you can't say what you want. There are two main types of advance directives. You can change them any time your wishes change. Living will. This form tells your family and your doctor your wishes about life support and other treatment. The form is also called a declaration. Medical power of . This form lets you name a person to make treatment decisions for you when you can't speak for yourself. This person is called a health care agent (health care proxy, health care surrogate). The form is also called a durable power of  for health care. If you do not have an advance directive, decisions about your medical care may be made by a family member, or by a doctor or a  who doesn't know you. It may help to think of an advance directive as a gift to the people who care for you. If you have one, they won't have to make tough decisions by themselves. For more information, including forms for your state, see the [address] website (www.caringinfo.org/planning/advance-directives/). Follow-up care is a key part of your treatment and safety. Be sure to make and go to all appointments, and call your doctor if you are having problems. It's also a good idea to know your test results and keep a list of the medicines you take. What should you include in an advance directive? Many states have a unique advance directive form. (It may ask you to address specific issues.) Or you might use a universal form that's approved by many states. If your form doesn't tell you what to address, it may be hard to know what to include in your advance directive. Use the questions below to help you get started. Who do you want to make decisions about your medical care if you are not able to?   What life-support measures do you want if you have a serious illness that gets worse over time or can't be cured? What are you most afraid of that might happen? (Maybe you're afraid of having pain, losing your independence, or being kept alive by machines.)  Where would you prefer to die? (Your home? A hospital? A nursing home?)  Do you want to donate your organs when you die? Do you want certain Taoism practices performed before you die? When should you call for help? Be sure to contact your doctor if you have any questions. Where can you learn more? Go to http://www.bundy.com/ and enter R264 to learn more about \"Advance Directives: Care Instructions. \"  Current as of: June 16, 2022               Content Version: 13.5  © 1445-8904 [employer]. Care instructions adapted under license by [facility] ([facility]). If you have questions about a medical condition or this instruction, always ask your healthcare professional. [place] [address] any warranty or liability for your use of this information. Personalized Preventive Plan for Xuan Stapleton - 12/28/2022  Medicare offers a range of preventive health benefits. Some of the tests and screenings are paid in full while other may be subject to a deductible, co-insurance, and/or copay. Some of these benefits include a comprehensive review of your medical history including lifestyle, illnesses that may run in your family, and various assessments and screenings as appropriate. After reviewing your medical record and screening and assessments performed today your provider may have ordered immunizations, labs, imaging, and/or referrals for you. A list of these orders (if applicable) as well as your Preventive Care list are included within your After Visit Summary for your review.     Other Preventive Recommendations:    A preventive eye exam performed by an eye specialist is recommended every 1-2 years to screen for glaucoma; cataracts, macular degeneration, and other eye disorders. A preventive dental visit is recommended every 6 months. Try to get at least 150 minutes of exercise per week or 10,000 steps per day on a pedometer . Order or download the FREE \"Exercise & Physical Activity: Your Everyday Guide\" from [employer]. Call 9-926.872.4951 or search [employer] online. You need 0738-2092 mg of calcium and 3381-6275 IU of vitamin D per day. It is possible to meet your calcium requirement with diet alone, but a vitamin D supplement is usually necessary to meet this goal.  When exposed to the sun, use a sunscreen that protects against both UVA and UVB radiation with an SPF of 30 or greater. Reapply every 2 to 3 hours or after sweating, drying off with a towel, or swimming. Always wear a seat belt when traveling in a car. Always wear a helmet when riding a bicycle or motorcycle.

## 2023-01-20 DIAGNOSIS — E11.8 TYPE 2 DIABETES MELLITUS WITH COMPLICATION, WITHOUT LONG-TERM CURRENT USE OF INSULIN (HCC): ICD-10-CM

## 2023-01-20 RX ORDER — BLOOD SUGAR DIAGNOSTIC
STRIP MISCELLANEOUS
Qty: 100 STRIP | Refills: 3 | Status: SHIPPED | OUTPATIENT
Start: 2023-01-20

## 2023-01-20 NOTE — TELEPHONE ENCOUNTER
Medication:   Requested Prescriptions     Pending Prescriptions Disp Refills    ACCU-CHEK SMARTVIEW strip [Pharmacy Med Name: David Gallagher   Strip] 100 strip 3     Sig: TEST BLOOD SUGAR EVERY DAY     Last Filled:  9/13/2021    Last appt: 12/28/2022   Next appt: 1/25/2023    Last Labs DM:   Lab Results   Component Value Date/Time    LABA1C 6.5 12/28/2022 09:41 AM     Last Lipid:   Lab Results   Component Value Date/Time    CHOL 144 12/28/2022 10:13 AM    TRIG 140 12/28/2022 10:13 AM    HDL 46 12/28/2022 10:13 AM    LDLCALC 70 12/28/2022 10:13 AM     Last PSA:   Lab Results   Component Value Date/Time    PSA 1.56 09/16/2019 10:29 AM     Last Thyroid:   Lab Results   Component Value Date/Time    TSH 0.94 09/18/2018 09:58 AM

## 2023-01-25 ENCOUNTER — TELEMEDICINE (OUTPATIENT)
Dept: PRIMARY CARE CLINIC | Age: 73
End: 2023-01-25

## 2023-01-25 DIAGNOSIS — E11.42 DIABETIC POLYNEUROPATHY ASSOCIATED WITH TYPE 2 DIABETES MELLITUS (HCC): Primary | ICD-10-CM

## 2023-01-25 DIAGNOSIS — D50.9 IRON DEFICIENCY ANEMIA, UNSPECIFIED IRON DEFICIENCY ANEMIA TYPE: ICD-10-CM

## 2023-01-25 RX ORDER — GABAPENTIN 100 MG/1
200 CAPSULE ORAL NIGHTLY
Qty: 180 CAPSULE | Refills: 1 | Status: SHIPPED | OUTPATIENT
Start: 2023-01-25 | End: 2023-02-24

## 2023-01-25 NOTE — PROGRESS NOTES
2023    TELEHEALTH EVALUATION -- Audio/Visual (During AXVOG-18 public health emergency)    Chief Complaint   Patient presents with    Peripheral Neuropathy       HPI:    Nita Walters (:  1950) has requested an audio/video evaluation for the following concern(s):    Patient has diabetic peripheral neuropathy. Patient has started gabapentin since last visit. Patient states he tried gabapentin 100 mg 1 capsule nightly and it did not help. Patient increased it to 2 capsules and states the 2 capsules are helping the numbness in his feet. Patient's prescription was written for gabapentin 100 mg 2 capsules nightly. Patient has iron deficiency anemia. Patient's recent labs show anemia and his iron is low. Patient takes ferrous sulfate 325 mg once daily. Patient states he eats green leafy vegetables and red meat. Patient states he does not have as much energy as he would like. Review of Systems   Constitutional:  Positive for fatigue. Negative for chills and fever. HENT:  Negative for congestion, postnasal drip, rhinorrhea and sore throat. Eyes:  Negative for visual disturbance. Respiratory:  Negative for cough, chest tightness and shortness of breath. Cardiovascular:  Negative for chest pain, palpitations and leg swelling. Gastrointestinal:  Negative for abdominal pain, constipation, diarrhea, nausea and vomiting. Endocrine: Negative for cold intolerance. Genitourinary:  Negative for dysuria and frequency. Neurological:  Positive for numbness. Negative for dizziness, seizures, light-headedness and headaches. Prior to Visit Medications    Medication Sig Taking? Authorizing Provider   ACCU-CHEK SMARTVIEW strip TEST BLOOD SUGAR EVERY DAY Yes Marylen Life, MD   gabapentin (NEURONTIN) 100 MG capsule Take 2 capsules by mouth at bedtime for 30 days.  Yes Marylen Life, MD   lovastatin (MEVACOR) 40 MG tablet TAKE 1 TABLET EVERY NIGHT Yes Marylen Life, MD lisinopril (PRINIVIL;ZESTRIL) 10 MG tablet Take 1 tablet by mouth daily TAKE 1 TABLET EVERY DAY Yes Emily Borjas MD   metFORMIN (GLUCOPHAGE) 850 MG tablet TAKE 1 TABLET TWICE DAILY WITH MEALS Yes Vaishnavi Anderson MD   diclofenac sodium (VOLTAREN) 1 % GEL Apply 2 g topically 2 times daily Yes Vaishnavi Anderson MD   ferrous sulfate 325 (65 Fe) MG tablet Take 1 tablet by mouth daily (with breakfast) Yes Vaishnavi Anderson MD   Blood Glucose Calibration (ACCU-CHEK SMARTVIEW CONTROL) LIQD Use as directed Yes Vaishnavi Anderson MD   ACCU-CHEK FASTCLIX LANCETS MISC 1 each by Does not apply route daily Yes Vaishnavi Anderson MD   magnesium oxide (MAG-OX) 400 MG tablet Take 400 mg by mouth daily Yes Historical Provider, MD   Alcohol Swabs (B-D SINGLE USE SWABS REGULAR) PADS USE AS DIRECTED Yes Vaishnavi Anderson MD   HOMEOPATHIC PRODUCTS EX Apply topically Indications: Gold Bond Diabetic Cream Yes Historical Provider, MD   Blood Glucose Monitoring Suppl (ACCU-CHEK SOTERO SMARTVIEW) W/DEVICE KIT Use as directed Yes Vaishnavi Anderson MD   acetaminophen (TYLENOL) 325 MG tablet Take 650 mg by mouth every 6 hours as needed for Pain. Yes Historical Provider, MD   vitamin D-3 (CHOLECALCIFEROL) 5000 UNITS TABS Take 1 tablet by mouth daily. Yes Vaishnavi Anderson MD   omeprazole (PRILOSEC) 40 MG delayed release capsule Take 40 mg by mouth daily. Yes Historical Provider, MD   Glucosamine 500 MG CAPS Take  by mouth daily. Yes Historical Provider, MD   Multiple Vitamins-Minerals (MULTIVITAMIN PO) Take  by mouth.  Yes Historical Provider, MD       Social History     Tobacco Use    Smoking status: Former     Packs/day: 2.00     Years: 20.00     Pack years: 40.00     Types: Cigarettes     Start date: 1965     Quit date: 1985     Years since quittin.1    Smokeless tobacco: Never   Vaping Use    Vaping Use: Never used   Substance Use Topics    Alcohol use: Yes     Comment: less than 1/week    Drug use: No        No Known Allergies,   Past Medical History:   Diagnosis Date    Anemia 5/21/2013    Paredes's esophagus     Colon polyps     GERD (gastroesophageal reflux disease)     Hyperlipidemia 9/20/2014    Hypertension 5/3/2013    Iron deficiency anemia 10/8/2013    Microalbuminuria 11/18/2016    Osteoarthritis 9/20/2014    Primary osteoarthritis of both knees 4/12/2016    Right inguinal hernia 5/3/2013    Vitamin D deficiency 12/19/2013       PHYSICAL EXAMINATION:  [ INSTRUCTIONS:  \"[x]\" Indicates a positive item  \"[]\" Indicates a negative item  -- DELETE ALL ITEMS NOT EXAMINED]  Vital Signs: (As obtained by patient/caregiver or practitioner observation)    Blood pressure-  Heart rate-    Respiratory rate-    Temperature-  Pulse oximetry-   Patient-Reported Vitals 1/25/2023   Patient-Reported Height 5'6\"   Patient-Reported Systolic 583   Patient-Reported Diastolic 197   Patient-Reported Pulse 74   Patient-Reported Temperature 97.3        Constitutional: [x] Appears well-developed and well-nourished [x] No apparent distress      [] Abnormal-   Mental status  [x] Alert and awake  [x] Oriented to person/place/time [x]Able to follow commands      Eyes:  EOM    [x]  Normal  [] Abnormal-  Sclera  [x]  Normal  [] Abnormal -         Discharge [x]  None visible  [] Abnormal -    HENT:   [x] Normocephalic, atraumatic.   [] Abnormal   [] Mouth/Throat: Mucous membranes are moist.     External Ears [x] Normal  [] Abnormal-     Neck: [x] No visualized mass     Pulmonary/Chest: [x] Respiratory effort normal.  [x] No visualized signs of difficulty breathing or respiratory distress        [] Abnormal-      Musculoskeletal:   [] Normal gait with no signs of ataxia         [x] Normal range of motion of neck        [] Abnormal-       Neurological:        [x] No Facial Asymmetry (Cranial nerve 7 motor function) (limited exam to video visit)          [x] No gaze palsy        [] Abnormal-         Skin:        [x] No significant exanthematous lesions or discoloration noted on facial skin         [] Abnormal-            Psychiatric:       [x] Normal Affect [] No Hallucinations        [] Abnormal-     Other pertinent observable physical exam findings-     Lab Review   Orders Only on 12/28/2022   Component Date Value    Vit D, 25-Hydroxy 12/28/2022 43.2     Iron 12/28/2022 54 (A)     TIBC 12/28/2022 367     Iron Saturation 12/28/2022 15 (A)     WBC 12/28/2022 7.0     RBC 12/28/2022 4.07 (A)     Hemoglobin 12/28/2022 11.2 (A)     Hematocrit 12/28/2022 34.5 (A)     MCV 12/28/2022 84.6     MCH 12/28/2022 27.5     MCHC 12/28/2022 32.5     RDW 12/28/2022 14.6     Platelets 63/79/9014 323     MPV 12/28/2022 8.8     Cholesterol, Total 12/28/2022 144     Triglycerides 12/28/2022 140     HDL 12/28/2022 46     LDL Calculated 12/28/2022 70     VLDL Cholesterol Calcula* 12/28/2022 28     Sodium 12/28/2022 137     Potassium 12/28/2022 4.6     Chloride 12/28/2022 98 (A)     CO2 12/28/2022 25     Anion Gap 12/28/2022 14     Glucose 12/28/2022 106 (A)     BUN 12/28/2022 13     Creatinine 12/28/2022 0.6 (A)     Est, Glom Filt Rate 12/28/2022 >60     Calcium 12/28/2022 10.1     Total Protein 12/28/2022 8.2     Albumin 12/28/2022 4.3     Albumin/Globulin Ratio 12/28/2022 1.1     Total Bilirubin 12/28/2022 <0.2     Alkaline Phosphatase 12/28/2022 51     ALT 12/28/2022 22     AST 12/28/2022 18    Office Visit on 12/28/2022   Component Date Value    Hemoglobin A1C 12/28/2022 6.5          ASSESSMENT/PLAN:  1. Diabetic polyneuropathy associated with type 2 diabetes mellitus (HCC)  - stable  - Continue gabapentin (NEURONTIN) 100 MG capsule; Take 2 capsules by mouth at bedtime for 30 days. Dispense: 180 capsule; Refill: 1    2.  Iron deficiency anemia, unspecified iron deficiency anemia type  -iron is low  -Increase Ferrous Sulfate 325mg to 2 times daily  -iron rich diet      Return in about 5 months (around 6/25/2023) for diabetes, neuropathy, hypertension, hyperlipidemia, iron deficiency, and vitamin D.    Kenan Odonnell, was evaluated through a synchronous (real-time) audio-video encounter. The patient (or guardian if applicable) is aware that this is a billable service. Verbal consent to proceed has been obtained within the past 12 months. The visit was conducted pursuant to the emergency declaration under the [address] [place] [address], [address] [place] [address] and the [email or web address] General Act. Patient identification was verified, and a caregiver was present when appropriate. The patient was located in a state where the provider was credentialed to provide care. Total time spent on this encounter: Not billed by time    --Alycia Cabrales MD on 1/29/2023 at 6:31 PM    An electronic signature was used to authenticate this note.

## 2023-01-29 ASSESSMENT — ENCOUNTER SYMPTOMS
ABDOMINAL PAIN: 0
CHEST TIGHTNESS: 0
VOMITING: 0
COUGH: 0
DIARRHEA: 0
SHORTNESS OF BREATH: 0
NAUSEA: 0
SORE THROAT: 0
CONSTIPATION: 0
RHINORRHEA: 0

## 2023-01-29 NOTE — PATIENT INSTRUCTIONS
1. Diabetic polyneuropathy associated with type 2 diabetes mellitus (HCC)  - stable  - Continue gabapentin (NEURONTIN) 100 MG capsule; Take 2 capsules by mouth at bedtime for 30 days.  Dispense: 180 capsule; Refill: 1    2. Iron deficiency anemia, unspecified iron deficiency anemia type  -iron is low  -Increase Ferrous Sulfate 325mg to 2 times daily  -iron rich diet

## 2023-03-02 DIAGNOSIS — E11.8 TYPE 2 DIABETES MELLITUS WITH COMPLICATION, WITHOUT LONG-TERM CURRENT USE OF INSULIN (HCC): ICD-10-CM

## 2023-03-28 ENCOUNTER — TELEPHONE (OUTPATIENT)
Dept: PRIMARY CARE CLINIC | Age: 73
End: 2023-03-28

## 2023-04-05 DIAGNOSIS — R80.9 MICROALBUMINURIA: ICD-10-CM

## 2023-04-05 DIAGNOSIS — N18.2 CKD (CHRONIC KIDNEY DISEASE), STAGE II: ICD-10-CM

## 2023-04-05 DIAGNOSIS — I10 ESSENTIAL HYPERTENSION: ICD-10-CM

## 2023-04-06 LAB
ANION GAP SERPL CALCULATED.3IONS-SCNC: 13 MMOL/L (ref 3–16)
BUN SERPL-MCNC: 12 MG/DL (ref 7–20)
CALCIUM SERPL-MCNC: 10 MG/DL (ref 8.3–10.6)
CHLORIDE SERPL-SCNC: 100 MMOL/L (ref 99–110)
CO2 SERPL-SCNC: 26 MMOL/L (ref 21–32)
CREAT SERPL-MCNC: 0.6 MG/DL (ref 0.8–1.3)
GFR SERPLBLD CREATININE-BSD FMLA CKD-EPI: >60 ML/MIN/{1.73_M2}
GLUCOSE SERPL-MCNC: 107 MG/DL (ref 70–99)
POTASSIUM SERPL-SCNC: 4.5 MMOL/L (ref 3.5–5.1)
SODIUM SERPL-SCNC: 139 MMOL/L (ref 136–145)

## 2023-06-26 SDOH — ECONOMIC STABILITY: FOOD INSECURITY: WITHIN THE PAST 12 MONTHS, THE FOOD YOU BOUGHT JUST DIDN'T LAST AND YOU DIDN'T HAVE MONEY TO GET MORE.: NEVER TRUE

## 2023-06-26 SDOH — ECONOMIC STABILITY: HOUSING INSECURITY
IN THE LAST 12 MONTHS, WAS THERE A TIME WHEN YOU DID NOT HAVE A STEADY PLACE TO SLEEP OR SLEPT IN A SHELTER (INCLUDING NOW)?: NO

## 2023-06-26 SDOH — ECONOMIC STABILITY: FOOD INSECURITY: WITHIN THE PAST 12 MONTHS, YOU WORRIED THAT YOUR FOOD WOULD RUN OUT BEFORE YOU GOT MONEY TO BUY MORE.: NEVER TRUE

## 2023-06-26 SDOH — ECONOMIC STABILITY: INCOME INSECURITY: HOW HARD IS IT FOR YOU TO PAY FOR THE VERY BASICS LIKE FOOD, HOUSING, MEDICAL CARE, AND HEATING?: NOT HARD AT ALL

## 2023-06-26 ASSESSMENT — PATIENT HEALTH QUESTIONNAIRE - PHQ9
SUM OF ALL RESPONSES TO PHQ9 QUESTIONS 1 & 2: 0
2. FEELING DOWN, DEPRESSED OR HOPELESS: NOT AT ALL
1. LITTLE INTEREST OR PLEASURE IN DOING THINGS: NOT AT ALL
SUM OF ALL RESPONSES TO PHQ QUESTIONS 1-9: 0
2. FEELING DOWN, DEPRESSED OR HOPELESS: 0
SUM OF ALL RESPONSES TO PHQ9 QUESTIONS 1 & 2: 0
SUM OF ALL RESPONSES TO PHQ QUESTIONS 1-9: 0
1. LITTLE INTEREST OR PLEASURE IN DOING THINGS: 0
SUM OF ALL RESPONSES TO PHQ QUESTIONS 1-9: 0
SUM OF ALL RESPONSES TO PHQ QUESTIONS 1-9: 0

## 2023-06-28 ENCOUNTER — OFFICE VISIT (OUTPATIENT)
Dept: PRIMARY CARE CLINIC | Age: 73
End: 2023-06-28
Payer: MEDICARE

## 2023-06-28 VITALS
SYSTOLIC BLOOD PRESSURE: 132 MMHG | WEIGHT: 200 LBS | BODY MASS INDEX: 32.28 KG/M2 | OXYGEN SATURATION: 96 % | DIASTOLIC BLOOD PRESSURE: 78 MMHG | HEART RATE: 69 BPM

## 2023-06-28 DIAGNOSIS — M17.0 PRIMARY OSTEOARTHRITIS OF BOTH KNEES: ICD-10-CM

## 2023-06-28 DIAGNOSIS — I10 ESSENTIAL HYPERTENSION: ICD-10-CM

## 2023-06-28 DIAGNOSIS — D50.9 IRON DEFICIENCY ANEMIA, UNSPECIFIED IRON DEFICIENCY ANEMIA TYPE: ICD-10-CM

## 2023-06-28 DIAGNOSIS — E11.8 TYPE 2 DIABETES MELLITUS WITH COMPLICATION, WITHOUT LONG-TERM CURRENT USE OF INSULIN (HCC): ICD-10-CM

## 2023-06-28 DIAGNOSIS — Z23 NEED FOR TDAP VACCINATION: ICD-10-CM

## 2023-06-28 DIAGNOSIS — E11.42 DIABETIC POLYNEUROPATHY ASSOCIATED WITH TYPE 2 DIABETES MELLITUS (HCC): ICD-10-CM

## 2023-06-28 DIAGNOSIS — E11.8 TYPE 2 DIABETES MELLITUS WITH COMPLICATION, WITHOUT LONG-TERM CURRENT USE OF INSULIN (HCC): Primary | ICD-10-CM

## 2023-06-28 DIAGNOSIS — E78.2 MIXED HYPERLIPIDEMIA: ICD-10-CM

## 2023-06-28 DIAGNOSIS — E55.9 VITAMIN D DEFICIENCY: ICD-10-CM

## 2023-06-28 LAB
25(OH)D3 SERPL-MCNC: 49.2 NG/ML
ALBUMIN SERPL-MCNC: 4.7 G/DL (ref 3.4–5)
ALBUMIN/GLOB SERPL: 1.2 {RATIO} (ref 1.1–2.2)
ALP SERPL-CCNC: 56 U/L (ref 40–129)
ALT SERPL-CCNC: 30 U/L (ref 10–40)
ANION GAP SERPL CALCULATED.3IONS-SCNC: 12 MMOL/L (ref 3–16)
AST SERPL-CCNC: 23 U/L (ref 15–37)
BILIRUB SERPL-MCNC: <0.2 MG/DL (ref 0–1)
BUN SERPL-MCNC: 13 MG/DL (ref 7–20)
CALCIUM SERPL-MCNC: 10.5 MG/DL (ref 8.3–10.6)
CHLORIDE SERPL-SCNC: 99 MMOL/L (ref 99–110)
CHOLEST SERPL-MCNC: 172 MG/DL (ref 0–199)
CO2 SERPL-SCNC: 26 MMOL/L (ref 21–32)
CREAT SERPL-MCNC: 0.7 MG/DL (ref 0.8–1.3)
CREAT UR-MCNC: 150.9 MG/DL (ref 39–259)
DEPRECATED RDW RBC AUTO: 14.4 % (ref 12.4–15.4)
GFR SERPLBLD CREATININE-BSD FMLA CKD-EPI: >60 ML/MIN/{1.73_M2}
GLUCOSE SERPL-MCNC: 106 MG/DL (ref 70–99)
HBA1C MFR BLD: 6.1 %
HCT VFR BLD AUTO: 35.3 % (ref 40.5–52.5)
HDLC SERPL-MCNC: 45 MG/DL (ref 40–60)
HGB BLD-MCNC: 11.9 G/DL (ref 13.5–17.5)
IRON SATN MFR SERPL: 16 % (ref 20–50)
IRON SERPL-MCNC: 52 UG/DL (ref 59–158)
LDLC SERPL CALC-MCNC: 93 MG/DL
MCH RBC QN AUTO: 28.8 PG (ref 26–34)
MCHC RBC AUTO-ENTMCNC: 33.7 G/DL (ref 31–36)
MCV RBC AUTO: 85.5 FL (ref 80–100)
MICROALBUMIN UR DL<=1MG/L-MCNC: 25.6 MG/DL
MICROALBUMIN/CREAT UR: 169.6 MG/G (ref 0–30)
PLATELET # BLD AUTO: 267 K/UL (ref 135–450)
PMV BLD AUTO: 8.6 FL (ref 5–10.5)
POTASSIUM SERPL-SCNC: 4.9 MMOL/L (ref 3.5–5.1)
PROT SERPL-MCNC: 8.6 G/DL (ref 6.4–8.2)
RBC # BLD AUTO: 4.13 M/UL (ref 4.2–5.9)
SODIUM SERPL-SCNC: 137 MMOL/L (ref 136–145)
TIBC SERPL-MCNC: 334 UG/DL (ref 260–445)
TRIGL SERPL-MCNC: 169 MG/DL (ref 0–150)
VLDLC SERPL CALC-MCNC: 34 MG/DL
WBC # BLD AUTO: 6.7 K/UL (ref 4–11)

## 2023-06-28 PROCEDURE — 3017F COLORECTAL CA SCREEN DOC REV: CPT | Performed by: INTERNAL MEDICINE

## 2023-06-28 PROCEDURE — 1036F TOBACCO NON-USER: CPT | Performed by: INTERNAL MEDICINE

## 2023-06-28 PROCEDURE — 2022F DILAT RTA XM EVC RTNOPTHY: CPT | Performed by: INTERNAL MEDICINE

## 2023-06-28 PROCEDURE — G8417 CALC BMI ABV UP PARAM F/U: HCPCS | Performed by: INTERNAL MEDICINE

## 2023-06-28 PROCEDURE — 99214 OFFICE O/P EST MOD 30 MIN: CPT | Performed by: INTERNAL MEDICINE

## 2023-06-28 PROCEDURE — 3074F SYST BP LT 130 MM HG: CPT | Performed by: INTERNAL MEDICINE

## 2023-06-28 PROCEDURE — G8427 DOCREV CUR MEDS BY ELIG CLIN: HCPCS | Performed by: INTERNAL MEDICINE

## 2023-06-28 PROCEDURE — 3044F HG A1C LEVEL LT 7.0%: CPT | Performed by: INTERNAL MEDICINE

## 2023-06-28 PROCEDURE — 3078F DIAST BP <80 MM HG: CPT | Performed by: INTERNAL MEDICINE

## 2023-06-28 PROCEDURE — 83037 HB GLYCOSYLATED A1C HOME DEV: CPT | Performed by: INTERNAL MEDICINE

## 2023-06-28 PROCEDURE — 1123F ACP DISCUSS/DSCN MKR DOCD: CPT | Performed by: INTERNAL MEDICINE

## 2023-07-05 ASSESSMENT — ENCOUNTER SYMPTOMS
SHORTNESS OF BREATH: 0
BLOOD IN STOOL: 0
RHINORRHEA: 0
VOMITING: 0
WHEEZING: 0
ABDOMINAL PAIN: 0
NAUSEA: 0
CONSTIPATION: 0
DIARRHEA: 0
CHEST TIGHTNESS: 0
COUGH: 0
SORE THROAT: 0
TROUBLE SWALLOWING: 0

## 2023-07-22 DIAGNOSIS — E78.2 MIXED HYPERLIPIDEMIA: ICD-10-CM

## 2023-07-24 RX ORDER — LOVASTATIN 40 MG/1
TABLET ORAL
Qty: 90 TABLET | Refills: 1 | Status: SHIPPED | OUTPATIENT
Start: 2023-07-24

## 2023-07-24 NOTE — TELEPHONE ENCOUNTER
Medication:   Requested Prescriptions     Pending Prescriptions Disp Refills    lovastatin (MEVACOR) 40 MG tablet [Pharmacy Med Name: LOVASTATIN 40 MG Tablet] 90 tablet 1     Sig: TAKE 1 TABLET EVERY NIGHT       Last Filled:      Patient Phone Number: [phone] (home)     Last appt: 6/28/2023   Next appt: 1/2/2024    Last Lipid:   Lab Results   Component Value Date/Time    CHOL 172 06/28/2023 12:42 PM    TRIG 169 06/28/2023 12:42 PM    HDL 45 06/28/2023 12:42 PM    [address] [address] 06/28/2023 12:42 PM       Last OARRS:   RX Monitoring 12/28/2022   Periodic Controlled Substance Monitoring No signs of potential drug abuse or diversion identified. Preferred Pharmacy:   [address] [place] [address] [address]  [address] [place] [address]  Phone: [phone] Fax: [phone]    Medication:   Requested Prescriptions     Pending Prescriptions Disp Refills    lovastatin (MEVACOR) 40 MG tablet [Pharmacy Med Name: LOVASTATIN 40 MG Tablet] 90 tablet 1     Sig: TAKE 1 TABLET EVERY NIGHT       Last Filled:      Patient Phone Number: [phone] (home)     Last appt: 6/28/2023   Next appt: 1/2/2024    Last Lipid:   Lab Results   Component Value Date/Time    CHOL 172 06/28/2023 12:42 PM    TRIG 169 06/28/2023 12:42 PM    HDL 45 06/28/2023 12:42 PM    [address] [address] 06/28/2023 12:42 PM       Last OARRS:   RX Monitoring 12/28/2022   Periodic Controlled Substance Monitoring No signs of potential drug abuse or diversion identified.        Preferred Pharmacy:   [facility] [phone] - F [phone]  [facility]  [address] [place] [address]  Phone: [phone] Fax: [phone]

## 2023-10-27 DIAGNOSIS — E11.8 TYPE 2 DIABETES MELLITUS WITH COMPLICATION, WITHOUT LONG-TERM CURRENT USE OF INSULIN (HCC): ICD-10-CM

## 2023-10-27 NOTE — TELEPHONE ENCOUNTER
Medication:   Requested Prescriptions     Pending Prescriptions Disp Refills    metFORMIN (GLUCOPHAGE) 850 MG tablet [Pharmacy Med Name: METFORMIN HYDROCHLORIDE 850 MG Tablet] 180 tablet 10     Sig: TAKE 1 TABLET TWICE DAILY WITH MEALS     Last Filled:  3.2.23    Last appt: 6/28/2023   Next appt: 1/2/2024    Last Labs DM:   Lab Results   Component Value Date/Time    LABA1C 6.1 06/28/2023 11:51 AM

## 2023-11-01 LAB
ANION GAP SERPL CALCULATED.3IONS-SCNC: 12 MMOL/L (ref 3–16)
BACTERIA URNS QL MICRO: ABNORMAL /HPF
BILIRUB UR QL STRIP.AUTO: NEGATIVE
BUN SERPL-MCNC: 8 MG/DL (ref 7–20)
CALCIUM SERPL-MCNC: 9.8 MG/DL (ref 8.3–10.6)
CHLORIDE SERPL-SCNC: 102 MMOL/L (ref 99–110)
CLARITY UR: CLEAR
CO2 SERPL-SCNC: 25 MMOL/L (ref 21–32)
COLOR UR: YELLOW
CREAT SERPL-MCNC: 0.5 MG/DL (ref 0.8–1.3)
EPI CELLS #/AREA URNS AUTO: 1 /HPF (ref 0–5)
GFR SERPLBLD CREATININE-BSD FMLA CKD-EPI: >60 ML/MIN/{1.73_M2}
GLUCOSE SERPL-MCNC: 106 MG/DL (ref 70–99)
GLUCOSE UR STRIP.AUTO-MCNC: NEGATIVE MG/DL
HGB UR QL STRIP.AUTO: NEGATIVE
HYALINE CASTS #/AREA URNS AUTO: 0 /LPF (ref 0–8)
KETONES UR STRIP.AUTO-MCNC: NEGATIVE MG/DL
LEUKOCYTE ESTERASE UR QL STRIP.AUTO: ABNORMAL
NITRITE UR QL STRIP.AUTO: NEGATIVE
PH UR STRIP.AUTO: 6 [PH] (ref 5–8)
POTASSIUM SERPL-SCNC: 4.7 MMOL/L (ref 3.5–5.1)
PROT UR STRIP.AUTO-MCNC: 30 MG/DL
RBC CLUMPS #/AREA URNS AUTO: 1 /HPF (ref 0–4)
SODIUM SERPL-SCNC: 139 MMOL/L (ref 136–145)
SP GR UR STRIP.AUTO: 1.01 (ref 1–1.03)
UA DIPSTICK W REFLEX MICRO PNL UR: YES
URN SPEC COLLECT METH UR: ABNORMAL
UROBILINOGEN UR STRIP-ACNC: 0.2 E.U./DL
WBC #/AREA URNS AUTO: 1 /HPF (ref 0–5)

## 2023-11-13 DIAGNOSIS — E11.42 DIABETIC POLYNEUROPATHY ASSOCIATED WITH TYPE 2 DIABETES MELLITUS (HCC): ICD-10-CM

## 2023-11-13 NOTE — TELEPHONE ENCOUNTER
Medication:   Requested Prescriptions     Pending Prescriptions Disp Refills    gabapentin (NEURONTIN) 100 MG capsule [Pharmacy Med Name: GABAPENTIN 100 MG Capsule] 180 capsule 10     Sig: TAKE 2 CAPSULES AT BEDTIME     Last Filled:  1.25.23    Last appt: 6/28/2023   Next appt: 1/2/2024    Last Labs DM:   Lab Results   Component Value Date/Time    LABA1C 6.1 06/28/2023 11:51 AM

## 2023-11-14 RX ORDER — GABAPENTIN 100 MG/1
200 CAPSULE ORAL NIGHTLY
Qty: 180 CAPSULE | Refills: 0 | Status: SHIPPED | OUTPATIENT
Start: 2023-11-14 | End: 2024-02-12

## 2023-11-15 NOTE — TELEPHONE ENCOUNTER
Controlled Substance Monitoring:    Acute and Chronic Pain Monitoring:   RX Monitoring Periodic Controlled Substance Monitoring   11/14/2023   7:11 PM No signs of potential drug abuse or diversion identified.

## 2023-12-30 SDOH — HEALTH STABILITY: PHYSICAL HEALTH: ON AVERAGE, HOW MANY MINUTES DO YOU ENGAGE IN EXERCISE AT THIS LEVEL?: 30 MIN

## 2023-12-30 SDOH — HEALTH STABILITY: PHYSICAL HEALTH: ON AVERAGE, HOW MANY DAYS PER WEEK DO YOU ENGAGE IN MODERATE TO STRENUOUS EXERCISE (LIKE A BRISK WALK)?: 4 DAYS

## 2023-12-30 ASSESSMENT — PATIENT HEALTH QUESTIONNAIRE - PHQ9
2. FEELING DOWN, DEPRESSED OR HOPELESS: 0
SUM OF ALL RESPONSES TO PHQ QUESTIONS 1-9: 0
SUM OF ALL RESPONSES TO PHQ QUESTIONS 1-9: 0
1. LITTLE INTEREST OR PLEASURE IN DOING THINGS: 0
SUM OF ALL RESPONSES TO PHQ QUESTIONS 1-9: 0
SUM OF ALL RESPONSES TO PHQ9 QUESTIONS 1 & 2: 0
SUM OF ALL RESPONSES TO PHQ QUESTIONS 1-9: 0

## 2023-12-30 ASSESSMENT — LIFESTYLE VARIABLES
HOW MANY STANDARD DRINKS CONTAINING ALCOHOL DO YOU HAVE ON A TYPICAL DAY: PATIENT DOES NOT DRINK
HOW MANY STANDARD DRINKS CONTAINING ALCOHOL DO YOU HAVE ON A TYPICAL DAY: 0
HOW OFTEN DO YOU HAVE SIX OR MORE DRINKS ON ONE OCCASION: 1

## 2024-01-02 ENCOUNTER — OFFICE VISIT (OUTPATIENT)
Dept: PRIMARY CARE CLINIC | Age: 74
End: 2024-01-02

## 2024-01-02 VITALS
WEIGHT: 206 LBS | SYSTOLIC BLOOD PRESSURE: 124 MMHG | HEART RATE: 64 BPM | HEIGHT: 66 IN | BODY MASS INDEX: 33.11 KG/M2 | DIASTOLIC BLOOD PRESSURE: 78 MMHG | RESPIRATION RATE: 18 BRPM | TEMPERATURE: 98 F | OXYGEN SATURATION: 96 %

## 2024-01-02 DIAGNOSIS — E78.2 MIXED HYPERLIPIDEMIA: ICD-10-CM

## 2024-01-02 DIAGNOSIS — H61.23 BILATERAL IMPACTED CERUMEN: ICD-10-CM

## 2024-01-02 DIAGNOSIS — D50.9 IRON DEFICIENCY ANEMIA, UNSPECIFIED IRON DEFICIENCY ANEMIA TYPE: ICD-10-CM

## 2024-01-02 DIAGNOSIS — K21.9 GASTROESOPHAGEAL REFLUX DISEASE, UNSPECIFIED WHETHER ESOPHAGITIS PRESENT: ICD-10-CM

## 2024-01-02 DIAGNOSIS — Z00.00 MEDICARE ANNUAL WELLNESS VISIT, SUBSEQUENT: Primary | ICD-10-CM

## 2024-01-02 DIAGNOSIS — E55.9 VITAMIN D DEFICIENCY: ICD-10-CM

## 2024-01-02 DIAGNOSIS — E11.42 DIABETIC POLYNEUROPATHY ASSOCIATED WITH TYPE 2 DIABETES MELLITUS (HCC): ICD-10-CM

## 2024-01-02 DIAGNOSIS — I10 ESSENTIAL HYPERTENSION: ICD-10-CM

## 2024-01-02 DIAGNOSIS — E11.8 TYPE 2 DIABETES MELLITUS WITH COMPLICATION, WITHOUT LONG-TERM CURRENT USE OF INSULIN (HCC): ICD-10-CM

## 2024-01-02 LAB
ALBUMIN SERPL-MCNC: 4.8 G/DL (ref 3.4–5)
ALP SERPL-CCNC: 49 U/L (ref 40–129)
ALT SERPL-CCNC: 31 U/L (ref 10–40)
AST SERPL-CCNC: 23 U/L (ref 15–37)
BILIRUB DIRECT SERPL-MCNC: <0.2 MG/DL (ref 0–0.3)
BILIRUB INDIRECT SERPL-MCNC: ABNORMAL MG/DL (ref 0–1)
BILIRUB SERPL-MCNC: 0.3 MG/DL (ref 0–1)
CHOLEST SERPL-MCNC: 141 MG/DL (ref 0–199)
DEPRECATED RDW RBC AUTO: 14 % (ref 12.4–15.4)
HBA1C MFR BLD: 6.9 %
HCT VFR BLD AUTO: 36.1 % (ref 40.5–52.5)
HDLC SERPL-MCNC: 43 MG/DL (ref 40–60)
HGB BLD-MCNC: 12 G/DL (ref 13.5–17.5)
IRON SATN MFR SERPL: 30 % (ref 20–50)
IRON SERPL-MCNC: 105 UG/DL (ref 59–158)
LDLC SERPL CALC-MCNC: 54 MG/DL
MCH RBC QN AUTO: 28.1 PG (ref 26–34)
MCHC RBC AUTO-ENTMCNC: 33.1 G/DL (ref 31–36)
MCV RBC AUTO: 84.8 FL (ref 80–100)
PLATELET # BLD AUTO: 286 K/UL (ref 135–450)
PMV BLD AUTO: 8.9 FL (ref 5–10.5)
PROT SERPL-MCNC: 8.5 G/DL (ref 6.4–8.2)
RBC # BLD AUTO: 4.26 M/UL (ref 4.2–5.9)
TIBC SERPL-MCNC: 349 UG/DL (ref 260–445)
TRIGL SERPL-MCNC: 219 MG/DL (ref 0–150)
VLDLC SERPL CALC-MCNC: 44 MG/DL
WBC # BLD AUTO: 8.2 K/UL (ref 4–11)

## 2024-01-02 RX ORDER — FERROUS SULFATE 325(65) MG
325 TABLET ORAL 2 TIMES DAILY
COMMUNITY
Start: 2024-01-02

## 2024-01-02 NOTE — PATIENT INSTRUCTIONS
when you cook and eat. This helps lower your blood pressure. Taste food before salting. Add only a little salt when you think you need it. With time, your taste buds will adjust to less salt.     Eat fewer snack items, fast foods, canned soups, and other high-salt, high-fat, processed foods.     Read food labels and try to avoid saturated and trans fats. They increase your risk of heart disease by raising cholesterol levels.     Limit the amount of solid fat-butter, margarine, and shortening-you eat. Use olive, peanut, or canola oil when you cook. Bake, broil, and steam foods instead of frying them.     Eat a variety of fruit and vegetables every day. Dark green, deep orange, red, or yellow fruits and vegetables are especially good for you. Examples include spinach, carrots, peaches, and berries.     Foods high in fiber can reduce your cholesterol and provide important vitamins and minerals. High-fiber foods include whole-grain cereals and breads, oatmeal, beans, brown rice, citrus fruits, and apples.     Eat lean proteins. Heart-healthy proteins include seafood, lean meats and poultry, eggs, beans, peas, nuts, seeds, and soy products.     Limit drinks and foods with added sugar. These include candy, desserts, and soda pop.   Lifestyle changes    If your doctor recommends it, get more exercise. Walking is a good choice. Bit by bit, increase the amount you walk every day. Try for at least 30 minutes on most days of the week. You also may want to swim, bike, or do other activities.     Do not smoke. If you need help quitting, talk to your doctor about stop-smoking programs and medicines. These can increase your chances of quitting for good. Quitting smoking may be the most important step you can take to protect your heart. It is never too late to quit.     Limit alcohol to 2 drinks a day for men and 1 drink a day for women. Too much alcohol can cause health problems.     Manage other health problems such as diabetes,

## 2024-01-02 NOTE — PROGRESS NOTES
Medicare Annual Wellness Visit    Ricardo Saez is here for Medicare AWV    Assessment & Plan     1. Medicare annual wellness visit, subsequent  -Medicare AWV done    2. Type 2 diabetes mellitus with complication, without long-term current use of insulin (HCC)  -Hemoglobin A1c of 6.9% shows diabetes is controlled  -Continue  Metformin 850mg 2 times daily  -Limit carbohydrates to 45 grams with meals and 15 grams with snacks  -monitor blood sugars  -goal for blood sugar fasting or pre-meal  is   -goal for blood sugar 2 hours after a meal is less than 180  -goal for blood sugar at bedtime is less than 150  -Regular aerobic exercise  -POCT glycosylated hemoglobin (Hb A1C)    3. Essential hypertension  -stable  -Continue Lisinopril 10mg once daily  -Low sodium diet  -Regular aerobic exercise    4. Mixed hyperlipidemia  -stable  -LDL is a goal  -Continue Lovastatin 40mg nightly  -Low fat, low cholesterol diet  -Regular aerobic exercise  -Lipid Panel; Future  -Hepatic Function Panel; Future    5. Diabetic polyneuropathy associated with type 2 diabetes mellitus (HCC)  - stable  - Continue gabapentin (NEURONTIN) 100 MG capsule; Take 2 capsules by mouth at bedtime    6. Gastroesophageal reflux disease, unspecified whether esophagitis present  -stable  -Continue Omeprazole 40mg once daily  -Decrease caffeine, avoid spicy foods, avoid tomato based foods  -Eat small meals instead of large meals  -Wait 2-3 hours after eating before lying down     7. Iron deficiency anemia, unspecified iron deficiency anemia type  - Continue ferrous sulfate (IRON 325) 325 (65 Fe) MG tablet; Take 1 tablet by mouth 2 times daily  - CBC; Future  - Iron and TIBC; Future    8. Vitamin D deficiency  -Stable  -Continue vitamin D 5000 IU once daily    9. Bilateral impacted cerumen  -Ear wax removed by Medical Assistant by soaking with peroxide for 5 minutes and then wax flushed out with an ear wash spray bottle                Recommendations for

## 2024-01-08 DIAGNOSIS — E11.8 TYPE 2 DIABETES MELLITUS WITH COMPLICATION, WITHOUT LONG-TERM CURRENT USE OF INSULIN (HCC): ICD-10-CM

## 2024-01-08 NOTE — TELEPHONE ENCOUNTER
Medication:   Requested Prescriptions     Pending Prescriptions Disp Refills    metFORMIN (GLUCOPHAGE) 850 MG tablet [Pharmacy Med Name: METFORMIN HYDROCHLORIDE 850 MG Tablet] 180 tablet 3     Sig: TAKE 1 TABLET TWICE DAILY WITH MEALS     Last Filled:  10.27.23    Last appt: 1/2/2024   Next appt: 7/2/2024    Last Labs DM:   Lab Results   Component Value Date/Time    LABA1C 6.9 01/02/2024 01:08 PM

## 2024-02-18 DIAGNOSIS — E11.42 DIABETIC POLYNEUROPATHY ASSOCIATED WITH TYPE 2 DIABETES MELLITUS (HCC): ICD-10-CM

## 2024-02-19 NOTE — TELEPHONE ENCOUNTER
Medication:   Requested Prescriptions     Pending Prescriptions Disp Refills    gabapentin (NEURONTIN) 100 MG capsule [Pharmacy Med Name: GABAPENTIN 100 MG Capsule] 180 capsule 3     Sig: TAKE 2 CAPSULES AT BEDTIME     Last Filled:  11.14.23    Last appt: 1/2/2024   Next appt: 7/2/2024    Last OARRS:       11/14/2023     7:11 PM   RX Monitoring   Periodic Controlled Substance Monitoring No signs of potential drug abuse or diversion identified.

## 2024-02-22 RX ORDER — GABAPENTIN 100 MG/1
200 CAPSULE ORAL NIGHTLY
Qty: 180 CAPSULE | Refills: 1 | Status: SHIPPED | OUTPATIENT
Start: 2024-02-22 | End: 2024-08-20

## 2024-02-22 NOTE — TELEPHONE ENCOUNTER
Controlled Substance Monitoring:    Acute and Chronic Pain Monitoring:   RX Monitoring Periodic Controlled Substance Monitoring   2/22/2024   6:45 PM No signs of potential drug abuse or diversion identified.

## 2024-02-23 DIAGNOSIS — E78.2 MIXED HYPERLIPIDEMIA: ICD-10-CM

## 2024-02-23 RX ORDER — LOVASTATIN 40 MG/1
TABLET ORAL
Qty: 90 TABLET | Refills: 1 | Status: SHIPPED | OUTPATIENT
Start: 2024-02-23

## 2024-02-23 NOTE — TELEPHONE ENCOUNTER
Medication:   Requested Prescriptions     Pending Prescriptions Disp Refills    lovastatin (MEVACOR) 40 MG tablet [Pharmacy Med Name: LOVASTATIN 40 MG Tablet] 90 tablet 3     Sig: TAKE 1 TABLET EVERY NIGHT     Last Filled:  7/24/2023    Last appt: 1/2/2024   Next appt: 7/11/2024    Last Lipid:   Lab Results   Component Value Date/Time    CHOL 141 01/02/2024 01:57 PM    TRIG 219 01/02/2024 01:57 PM    HDL 43 01/02/2024 01:57 PM    LDLCALC 54 01/02/2024 01:57 PM

## 2024-03-22 DIAGNOSIS — E11.8 TYPE 2 DIABETES MELLITUS WITH COMPLICATION, WITHOUT LONG-TERM CURRENT USE OF INSULIN (HCC): ICD-10-CM

## 2024-03-22 RX ORDER — BLOOD SUGAR DIAGNOSTIC
STRIP MISCELLANEOUS
Qty: 100 STRIP | Refills: 3 | Status: SHIPPED | OUTPATIENT
Start: 2024-03-22

## 2024-03-22 NOTE — TELEPHONE ENCOUNTER
Medication:   Requested Prescriptions     Pending Prescriptions Disp Refills    ACCU-CHEK SMARTVIEW strip [Pharmacy Med Name: ACCU-CHEK SMARTVIEW STRIPS   Strip] 100 strip 3     Sig: TEST BLOOD SUGAR EVERY DAY     Last Filled:  1/20/2023    Last appt: 1/2/2024   Next appt: 7/11/2024    Last Labs DM:   Lab Results   Component Value Date/Time    LABA1C 6.9 01/02/2024 01:08 PM

## 2024-06-03 DIAGNOSIS — E11.8 TYPE 2 DIABETES MELLITUS WITH COMPLICATION, WITHOUT LONG-TERM CURRENT USE OF INSULIN (HCC): ICD-10-CM

## 2024-06-03 NOTE — TELEPHONE ENCOUNTER
Medication:   Requested Prescriptions     Pending Prescriptions Disp Refills    metFORMIN (GLUCOPHAGE) 850 MG tablet [Pharmacy Med Name: METFORMIN HYDROCHLORIDE 850 MG Tablet] 180 tablet 3     Sig: TAKE 1 TABLET TWICE DAILY WITH MEALS     Last Filled:  1/8/24    Last appt: 1/2/2024   Next appt: 7/11/2024    Last Labs DM:   Lab Results   Component Value Date/Time    LABA1C 6.9 01/02/2024 01:08 PM

## 2024-06-26 DIAGNOSIS — E11.42 DIABETIC POLYNEUROPATHY ASSOCIATED WITH TYPE 2 DIABETES MELLITUS (HCC): ICD-10-CM

## 2024-06-26 RX ORDER — GABAPENTIN 100 MG/1
CAPSULE ORAL
Qty: 180 CAPSULE | Refills: 3 | OUTPATIENT
Start: 2024-06-26

## 2024-06-26 NOTE — TELEPHONE ENCOUNTER
Medication:   Requested Prescriptions     Pending Prescriptions Disp Refills    gabapentin (NEURONTIN) 100 MG capsule [Pharmacy Med Name: GABAPENTIN 100 MG Capsule] 180 capsule 3     Sig: TAKE 2 CAPSULES AT BEDTIME     Last Filled:  02/22/2024    Last appt: 1/2/2024   Next appt: 7/11/2024    Last OARRS:       2/22/2024     6:45 PM   RX Monitoring   Periodic Controlled Substance Monitoring No signs of potential drug abuse or diversion identified.

## 2024-07-08 SDOH — ECONOMIC STABILITY: FOOD INSECURITY: WITHIN THE PAST 12 MONTHS, YOU WORRIED THAT YOUR FOOD WOULD RUN OUT BEFORE YOU GOT MONEY TO BUY MORE.: NEVER TRUE

## 2024-07-08 SDOH — ECONOMIC STABILITY: FOOD INSECURITY: WITHIN THE PAST 12 MONTHS, THE FOOD YOU BOUGHT JUST DIDN'T LAST AND YOU DIDN'T HAVE MONEY TO GET MORE.: NEVER TRUE

## 2024-07-08 SDOH — ECONOMIC STABILITY: INCOME INSECURITY: HOW HARD IS IT FOR YOU TO PAY FOR THE VERY BASICS LIKE FOOD, HOUSING, MEDICAL CARE, AND HEATING?: NOT HARD AT ALL

## 2024-07-08 ASSESSMENT — PATIENT HEALTH QUESTIONNAIRE - PHQ9
2. FEELING DOWN, DEPRESSED OR HOPELESS: NOT AT ALL
SUM OF ALL RESPONSES TO PHQ9 QUESTIONS 1 & 2: 0
1. LITTLE INTEREST OR PLEASURE IN DOING THINGS: NOT AT ALL
SUM OF ALL RESPONSES TO PHQ QUESTIONS 1-9: 0
1. LITTLE INTEREST OR PLEASURE IN DOING THINGS: NOT AT ALL
2. FEELING DOWN, DEPRESSED OR HOPELESS: NOT AT ALL
SUM OF ALL RESPONSES TO PHQ9 QUESTIONS 1 & 2: 0
SUM OF ALL RESPONSES TO PHQ QUESTIONS 1-9: 0

## 2024-07-11 ENCOUNTER — OFFICE VISIT (OUTPATIENT)
Dept: PRIMARY CARE CLINIC | Age: 74
End: 2024-07-11

## 2024-07-11 VITALS
DIASTOLIC BLOOD PRESSURE: 78 MMHG | SYSTOLIC BLOOD PRESSURE: 140 MMHG | HEART RATE: 70 BPM | WEIGHT: 198 LBS | TEMPERATURE: 97 F | HEIGHT: 66 IN | OXYGEN SATURATION: 98 % | BODY MASS INDEX: 31.82 KG/M2

## 2024-07-11 DIAGNOSIS — K21.9 GASTROESOPHAGEAL REFLUX DISEASE, UNSPECIFIED WHETHER ESOPHAGITIS PRESENT: ICD-10-CM

## 2024-07-11 DIAGNOSIS — E78.2 MIXED HYPERLIPIDEMIA: ICD-10-CM

## 2024-07-11 DIAGNOSIS — R77.8 ELEVATED TOTAL PROTEIN: ICD-10-CM

## 2024-07-11 DIAGNOSIS — D50.9 IRON DEFICIENCY ANEMIA, UNSPECIFIED IRON DEFICIENCY ANEMIA TYPE: ICD-10-CM

## 2024-07-11 DIAGNOSIS — E11.42 DIABETIC POLYNEUROPATHY ASSOCIATED WITH TYPE 2 DIABETES MELLITUS (HCC): ICD-10-CM

## 2024-07-11 DIAGNOSIS — E11.8 TYPE 2 DIABETES MELLITUS WITH COMPLICATION, WITHOUT LONG-TERM CURRENT USE OF INSULIN (HCC): Primary | ICD-10-CM

## 2024-07-11 DIAGNOSIS — E55.9 VITAMIN D DEFICIENCY: ICD-10-CM

## 2024-07-11 DIAGNOSIS — L30.9 DERMATITIS: ICD-10-CM

## 2024-07-11 DIAGNOSIS — D49.2 SKIN GROWTH: ICD-10-CM

## 2024-07-11 DIAGNOSIS — I10 ESSENTIAL HYPERTENSION: ICD-10-CM

## 2024-07-11 DIAGNOSIS — E11.8 TYPE 2 DIABETES MELLITUS WITH COMPLICATION, WITHOUT LONG-TERM CURRENT USE OF INSULIN (HCC): ICD-10-CM

## 2024-07-11 LAB
25(OH)D3 SERPL-MCNC: 46.1 NG/ML
ALBUMIN SERPL-MCNC: 4.4 G/DL (ref 3.4–5)
ALBUMIN/GLOB SERPL: 1.1 {RATIO} (ref 1.1–2.2)
ALP SERPL-CCNC: 46 U/L (ref 40–129)
ALT SERPL-CCNC: 24 U/L (ref 10–40)
ANION GAP SERPL CALCULATED.3IONS-SCNC: 14 MMOL/L (ref 3–16)
AST SERPL-CCNC: 19 U/L (ref 15–37)
BILIRUB SERPL-MCNC: <0.2 MG/DL (ref 0–1)
BUN SERPL-MCNC: 10 MG/DL (ref 7–20)
CALCIUM SERPL-MCNC: 10.2 MG/DL (ref 8.3–10.6)
CHLORIDE SERPL-SCNC: 97 MMOL/L (ref 99–110)
CHOLEST SERPL-MCNC: 149 MG/DL (ref 0–199)
CO2 SERPL-SCNC: 26 MMOL/L (ref 21–32)
CREAT SERPL-MCNC: <0.5 MG/DL (ref 0.8–1.3)
DEPRECATED RDW RBC AUTO: 15.2 % (ref 12.4–15.4)
GFR SERPLBLD CREATININE-BSD FMLA CKD-EPI: >90 ML/MIN/{1.73_M2}
GLUCOSE SERPL-MCNC: 106 MG/DL (ref 70–99)
HBA1C MFR BLD: 6.7 %
HCT VFR BLD AUTO: 35.6 % (ref 40.5–52.5)
HDLC SERPL-MCNC: 40 MG/DL (ref 40–60)
HGB BLD-MCNC: 11.7 G/DL (ref 13.5–17.5)
IRON SATN MFR SERPL: 25 % (ref 20–50)
IRON SERPL-MCNC: 78 UG/DL (ref 59–158)
LDLC SERPL CALC-MCNC: ABNORMAL MG/DL
LDLC SERPL-MCNC: 70 MG/DL
MCH RBC QN AUTO: 28.3 PG (ref 26–34)
MCHC RBC AUTO-ENTMCNC: 32.9 G/DL (ref 31–36)
MCV RBC AUTO: 85.8 FL (ref 80–100)
PLATELET # BLD AUTO: 293 K/UL (ref 135–450)
PMV BLD AUTO: 8.7 FL (ref 5–10.5)
POTASSIUM SERPL-SCNC: 4.7 MMOL/L (ref 3.5–5.1)
RBC # BLD AUTO: 4.15 M/UL (ref 4.2–5.9)
SODIUM SERPL-SCNC: 137 MMOL/L (ref 136–145)
TIBC SERPL-MCNC: 318 UG/DL (ref 260–445)
TRIGL SERPL-MCNC: 347 MG/DL (ref 0–150)
VLDLC SERPL CALC-MCNC: ABNORMAL MG/DL
WBC # BLD AUTO: 8.1 K/UL (ref 4–11)

## 2024-07-11 SDOH — ECONOMIC STABILITY: FOOD INSECURITY: WITHIN THE PAST 12 MONTHS, THE FOOD YOU BOUGHT JUST DIDN'T LAST AND YOU DIDN'T HAVE MONEY TO GET MORE.: NEVER TRUE

## 2024-07-11 SDOH — ECONOMIC STABILITY: INCOME INSECURITY: HOW HARD IS IT FOR YOU TO PAY FOR THE VERY BASICS LIKE FOOD, HOUSING, MEDICAL CARE, AND HEATING?: NOT HARD AT ALL

## 2024-07-11 SDOH — ECONOMIC STABILITY: FOOD INSECURITY: WITHIN THE PAST 12 MONTHS, YOU WORRIED THAT YOUR FOOD WOULD RUN OUT BEFORE YOU GOT MONEY TO BUY MORE.: NEVER TRUE

## 2024-07-11 ASSESSMENT — ENCOUNTER SYMPTOMS
COUGH: 0
TROUBLE SWALLOWING: 0
NAUSEA: 0
CHEST TIGHTNESS: 0
SHORTNESS OF BREATH: 0
DIARRHEA: 0
WHEEZING: 0
CONSTIPATION: 0
RHINORRHEA: 0
ABDOMINAL PAIN: 0
VOMITING: 0
BLOOD IN STOOL: 0
SORE THROAT: 0

## 2024-07-11 NOTE — PROGRESS NOTES
89.8 kg (198 lb)   01/02/24 93.4 kg (206 lb)   11/08/23 92.5 kg (204 lb)       Physical Exam  Nursing note reviewed.   Constitutional:       General: He is not in acute distress.     Appearance: Normal appearance. He is well-developed.   HENT:      Mouth/Throat:      Pharynx: Oropharynx is clear.   Eyes:      General: Lids are normal.      Extraocular Movements: Extraocular movements intact.      Conjunctiva/sclera: Conjunctivae normal.      Pupils: Pupils are equal, round, and reactive to light.   Neck:      Thyroid: No thyromegaly.      Vascular: No carotid bruit.   Cardiovascular:      Rate and Rhythm: Normal rate and regular rhythm.      Pulses: Normal pulses.      Heart sounds: Normal heart sounds, S1 normal and S2 normal. No murmur heard.     No friction rub. No gallop.   Pulmonary:      Effort: Pulmonary effort is normal. No respiratory distress.      Breath sounds: Normal breath sounds. No wheezing, rhonchi or rales.   Abdominal:      General: Bowel sounds are normal. There is no distension.      Palpations: Abdomen is soft.      Tenderness: There is no abdominal tenderness.   Musculoskeletal:      Cervical back: Neck supple.      Right knee: No swelling. Tenderness present.      Left knee: No swelling. Tenderness present.      Right lower leg: No edema.      Left lower leg: No edema.   Lymphadenopathy:      Head:      Right side of head: No submandibular adenopathy.      Left side of head: No submandibular adenopathy.   Skin:     Findings: Lesion present.      Comments: No foot ulcers, dry erythematous patch right forearm with scaling and excoriation, dry raised pointed rough scaly skin growth left forearm that is nontender, toenails yellow and thick, and callus lateral side of right great toe   Neurological:      Mental Status: He is alert.      Gait: Gait normal.      Comments: Bilateral foot monofilament exam normal   Psychiatric:         Mood and Affect: Mood normal.           Lab Review   Results for

## 2024-07-11 NOTE — PATIENT INSTRUCTIONS
-Continue same medications  -Low sodium diet  -Low fat, low cholesterol diet  -Limit carbohydrates to 45 grams with meals and 15 grams with snacks  -monitor blood sugars  -goal for blood sugar fasting or pre-meal  is   -goal for blood sugar 2 hours after a meal is less than 180  -goal for blood sugar at bedtime is less than 150  -Regular aerobic exercise

## 2024-07-12 LAB
ALBUMIN SERPL ELPH-MCNC: 3.9 G/DL (ref 3.1–4.9)
ALPHA1 GLOB SERPL ELPH-MCNC: 0.3 G/DL (ref 0.2–0.4)
ALPHA2 GLOB SERPL ELPH-MCNC: 1.1 G/DL (ref 0.4–1.1)
B-GLOBULIN SERPL ELPH-MCNC: 1.1 G/DL (ref 0.9–1.6)
GAMMA GLOB SERPL ELPH-MCNC: 2 G/DL (ref 0.6–1.8)
M PROTEIN 1 SERPL ELPH-MCNC: 1.3 G/DL
PROT SERPL-MCNC: 8.3 G/DL (ref 6.4–8.2)
SPE/IFE INTERPRETATION: NORMAL

## 2024-07-21 DIAGNOSIS — E78.2 MIXED HYPERLIPIDEMIA: ICD-10-CM

## 2024-07-22 NOTE — TELEPHONE ENCOUNTER
Medication:   Requested Prescriptions     Pending Prescriptions Disp Refills    lovastatin (MEVACOR) 40 MG tablet [Pharmacy Med Name: LOVASTATIN 40 MG Tablet] 90 tablet 3     Sig: TAKE 1 TABLET EVERY NIGHT     Last Filled:  2/23/24    Last appt: 7/11/2024   Next appt: 1/7/2025    Last Lipid:   Lab Results   Component Value Date/Time    CHOL 149 07/11/2024 08:31 AM    TRIG 347 07/11/2024 08:31 AM    HDL 40 07/11/2024 08:31 AM

## 2024-07-23 RX ORDER — LOVASTATIN 40 MG/1
TABLET ORAL
Qty: 90 TABLET | Refills: 1 | Status: SHIPPED | OUTPATIENT
Start: 2024-07-23

## 2024-08-30 LAB — DIABETIC RETINOPATHY: NEGATIVE

## 2024-10-27 DIAGNOSIS — E11.8 TYPE 2 DIABETES MELLITUS WITH COMPLICATION, WITHOUT LONG-TERM CURRENT USE OF INSULIN (HCC): ICD-10-CM

## 2024-10-28 NOTE — TELEPHONE ENCOUNTER
Patient:   AGATA FRANCISCO            MRN: [id number]            FIN: [id number]              Age:   68 years     Sex:  MALE     :  49   Associated Diagnoses:   None   Author:   JONATHAN MYERS     [facility] Infectious Disease Consult Note      History of Present Illness   The patient is a 67 y/o  male who presents with swelling, soreness to the surgical site of his recent left carotid endartarectomy done on 3/11/20.  His PMH is significant for HLD, HTN and is here for evaluation of fever, cough, nausea, vomting, chills, as well as swelling over the left carotid surgical site that had spread to his left ear.  The patient denied any drainage or crusting, though he thought it may have something to do with  the ear plugs he wears at night.  He denies any shortness of breath, chest pain.  Here at [facility], the patient has been afebrile and had a normal WBC.  He had a CT head done that was unremarkable.  He was started on empiric zosyn.  Rapid flu was negative.  Vascular surgery has evaluated the patient and according to their note, does not feel the site is acutely infected.   ID has been asked to assist with further evaluation and treatment.         Review of Systems   CONS:          No fatigue, sweat, chills, malaise  HEENT:        No changes in vision, no bluriness, nasal congestions, no change in hearing, no sore throat  CARDIO:      No chest pain, SOB, dyspnea, palpitations  RESP:          No dyspnea, no cough, no sputum production  GI:                No abdominal pain, no nausea/vomiting/diarrhea, no bloody stools  :              No dysuria, hematuria, flank pain  SKIN/Ext:     + mild swelling to left neck, ear. + mild redness to left neck, ear.   NEURO:       No headache, no weakness, no numbness/tingling          Health Status   Allergies:    Allergic Reactions (All)  Severity Not Documented  Plavix- No reactions were documented.  Canceled/Inactive Reactions (All)  Severity Not Documented  N,-  Medication:   Requested Prescriptions     Pending Prescriptions Disp Refills    metFORMIN (GLUCOPHAGE) 850 MG tablet [Pharmacy Med Name: metFORMIN HCl Oral Tablet 850 MG] 180 tablet 3     Sig: TAKE 1 TABLET TWICE DAILY WITH MEALS     Last Filled:  6.3.24    Last appt: 7/11/2024   Next appt: 1/7/2025    Last Labs DM:   Lab Results   Component Value Date/Time    LABA1C 6.7 07/11/2024 07:37 AM      No reactions were documented.  NKA   Current medications:    Medications (17) Active  Scheduled: (13)  Acetaminophen 500 mg tab  1,000 mg 2 tab, Oral, Q8H  Aspirin 325 mg tab  325 mg 1 tab, Oral, Daily  Atorvastatin 80 mg tab  80 mg 1 tab, Oral, Daily  BuPROPion 150 mg SR tab 12 hr  300 mg 2 tab, Oral, Daily  BusPIRone 15 mg tab  15 mg 1 tab, Oral, Daily  CarBAMazepine 200 mg tab  200 mg 1 tab, Oral, QAM at 8  CarBAMazepine 200 mg tab  400 mg 2 tab, Oral, Q Bedtime  Heparin 5,000 unit/1 mL inj  5,000 unit 1 mL, Subcutaneous, Q8H  Lactobacillus acidophilus tab  1 tab, Oral, Daily  Metoprolol succinate 50 mg XL tab  50 mg 1 tab, Oral, BID  piperacillin-tazobactam  3.375 gm, IVPB, Q8H  Tolterodine 2 mg tab  2 mg 1 tab, Oral, Daily  Valsartan 40 mg tab  40 mg 1 tab, Oral, Daily  Continuous: (0)  PRN: (4)  HydrALAZINE 20 mg/1 mL inj SDV  10 mg 0.5 mL, Slow IV Push, Q3H  Morphine PF 2 mg/1 mL inj SDV  2 mg 1 mL, IV Push, Q4H  Ondansetron 4 mg/2 mL inj SDV  4 mg 2 mL, Slow IV Push, Q6H  TraMADol 50 mg tab  50 mg 1 tab, Oral, Q8H      Histories   Past Medical History:    Patient Stated  Ulnar fracture / [id number] / Confirmed   Family History:    No family history items have been selected or recorded.   Procedure history:    Colonoscopy ([id number]).  Vasectomy ([id number]).   Social History       Alcohol  Details: Use: None.  Exercise  Details: Excercise: Never.  Home/Environment  Details: Alcohol Abuse in Household: Yes.  Substance Abuse in Household: No.  Smoker in Household: Yes.  Patient Lives With: Spouse.  Living Situation: Lives With Spouse.  Ambulation: Independent.  Bathing: Independent.  Substance Abuse  Details: Use: None.  Tobacco  Details: Smoked/Smokeless Tobacco Last 30 Days: No.  Use: Former smoker.  Type: Cigarettes, Cigars, Oral.  Year(s): 15.  Started Age: 15.0 Years.  Stopped Age: 28 Years.  Cultural/Adventist Practices  Details: Adventist or Cultural Practices While in Hospital: No.  .       Physical  Examination   General:  Alert and oriented, No acute distress.    Eye:  Pupils are equal, round and reactive to light, Normal conjunctiva.   HENT:  Normocephalic, Oral mucosa is moist, Left ear with mild erythema; no dranage, tenderness, swelling noted.   Neck:  Supple, Left carotid site with mild erythema and edema, improved per patient. Surgical site with sutures intact, open to air, no drainage noted..   Respiratory:  Lungs are clear to auscultation, Respirations are non-labored, Breath sounds are equal, Symmetrical chest wall expansion.   Cardiovascular:  Normal rate, No gallop.    Gastrointestinal:  Soft, Non-tender, Non-distended.    Genitourinary:  No costovertebral angle tenderness.    Musculoskeletal     Normal range of motion.     No deformity.     Integumentary:  Warm, Dry.    Neurologic:  Alert, Oriented.    Cognition and Speech:  Oriented, Speech clear and coherent.    Psychiatric:  Cooperative, Appropriate mood & affect.      Review / Management   Labs between:  13-MAR-2020 12:30 to 14-MAR-2020 12:30  CBC:                 WBC  HgB  Hct  Plt  MCV  RDW   14-MAR-2020 10.7  13.0  39.0  194  94.7  12.1   13-MAR-2020 10.6  13.5  41.2  200  94.7  11.9   DIFF:                 Seg  Neutroph//ABS  Lymph//ABS  Mono//ABS  EOS/ABS  14-MAR-2020 NOT APPLICABLE  75 // (H) 8.2  10 // 1.1 12 // (H) 1.3  1 // 0.1  13-MAR-2020 NOT APPLICABLE  74 // (H) 7.9  13 // 1.4 10 // (H) 1.1  1 // 0.1  BMP:                 Na  Cl  BUN  Glu   14-MAR-2020 140  105  10  (H) 106                              K  CO2  Cr  Ca                              3.6  23  0.86  8.6   BMP:                 Na  Cl  BUN  Glu   13-MAR-2020 140  103  12  (H) 108                              K  CO2  Cr  Ca                              3.8  28  0.88  9.4   CMP:                 AST  ALT  AlkPhos  Bili  Albumin   14-MAR-2020 26  11  94  0.5  (L) 3.1                  Micro:  3/11 MRSA nares: neg  Imaging:  3/13 CXR:  IMPRESSION:  1.   SUBOPTIMAL  INSPIRATION  2.   NO ACUTE CARDIOPULMONARY PROCESS  3/14 CT head:  IMPRESSION:  1. No acute intracranial process.        Impression and Plan   #Possible surgical site infection   -  S/p L carotid endartarectomy 3/11/20 per Dr. Wasserman   -  Per vascular notes, does not appear acutely infected   -  CT head WNL   -  Afebrile since admission   -  WBC WNL  #Nausea, vomiting   -  Resolved  #Cough   -  Rapid flu neg  PLAN:  -  Continue empiric zosyn for now; continue to monitor clinical improvement of surgical site  -  CBC, BMP in am and Monday  -  Monitor clinically  -  Follow wbc trend and temp curve  -  Vascular sx on case; notes read and appreciated  -  D/w patient, RN  -  Discussed case/abx/plan of care with Dr. Catherine  Thank you for the consult, we will follow.  Patient seen and examined on 3/14.  Agree with NP plan  Presents with possible SSTI at site of left carotid endarterectomy  Afebrile  CTAB, RRR, abd NT.  improved edema in left neck.  Minimal erythema, no drainage  normal WBCs  Plan:  -cont Zosyn  -monitor neck  -can likely D/C on oral antibiotics in 1-2 days

## 2024-12-04 DIAGNOSIS — E11.42 DIABETIC POLYNEUROPATHY ASSOCIATED WITH TYPE 2 DIABETES MELLITUS (HCC): ICD-10-CM

## 2024-12-04 NOTE — TELEPHONE ENCOUNTER
Medication:   Requested Prescriptions     Pending Prescriptions Disp Refills    gabapentin (NEURONTIN) 100 MG capsule [Pharmacy Med Name: Gabapentin Oral Capsule 100 MG] 180 capsule 3     Sig: TAKE 2 CAPSULES AT BEDTIME     Last Filled:  2.22.24    Last appt: 7/11/2024   Next appt: 1/7/2025    Last OARRS:       7/11/2024     7:52 AM   RX Monitoring   Periodic Controlled Substance Monitoring No signs of potential drug abuse or diversion identified.

## 2024-12-08 RX ORDER — GABAPENTIN 100 MG/1
CAPSULE ORAL
Qty: 180 CAPSULE | Refills: 0 | Status: SHIPPED | OUTPATIENT
Start: 2024-12-08 | End: 2025-03-08

## 2024-12-16 DIAGNOSIS — E78.2 MIXED HYPERLIPIDEMIA: ICD-10-CM

## 2024-12-16 RX ORDER — LOVASTATIN 40 MG/1
TABLET ORAL
Qty: 90 TABLET | Refills: 0 | Status: SHIPPED | OUTPATIENT
Start: 2024-12-16

## 2024-12-16 NOTE — TELEPHONE ENCOUNTER
Medication:   Requested Prescriptions     Pending Prescriptions Disp Refills    lovastatin (MEVACOR) 40 MG tablet [Pharmacy Med Name: Lovastatin Oral Tablet 40 MG] 90 tablet 3     Sig: TAKE 1 TABLET EVERY NIGHT     Last Filled:  07/23/2024    Last appt: 7/11/2024   Next appt: 1/7/2025    Last OARRS:       12/8/2024    11:17 AM   RX Monitoring   Periodic Controlled Substance Monitoring No signs of potential drug abuse or diversion identified.

## 2025-02-02 SDOH — ECONOMIC STABILITY: INCOME INSECURITY: IN THE LAST 12 MONTHS, WAS THERE A TIME WHEN YOU WERE NOT ABLE TO PAY THE MORTGAGE OR RENT ON TIME?: NO

## 2025-02-02 SDOH — ECONOMIC STABILITY: FOOD INSECURITY: WITHIN THE PAST 12 MONTHS, YOU WORRIED THAT YOUR FOOD WOULD RUN OUT BEFORE YOU GOT MONEY TO BUY MORE.: NEVER TRUE

## 2025-02-02 SDOH — HEALTH STABILITY: PHYSICAL HEALTH: ON AVERAGE, HOW MANY DAYS PER WEEK DO YOU ENGAGE IN MODERATE TO STRENUOUS EXERCISE (LIKE A BRISK WALK)?: 3 DAYS

## 2025-02-02 SDOH — ECONOMIC STABILITY: FOOD INSECURITY: WITHIN THE PAST 12 MONTHS, THE FOOD YOU BOUGHT JUST DIDN'T LAST AND YOU DIDN'T HAVE MONEY TO GET MORE.: NEVER TRUE

## 2025-02-02 SDOH — HEALTH STABILITY: PHYSICAL HEALTH: ON AVERAGE, HOW MANY MINUTES DO YOU ENGAGE IN EXERCISE AT THIS LEVEL?: 30 MIN

## 2025-02-02 ASSESSMENT — PATIENT HEALTH QUESTIONNAIRE - PHQ9
SUM OF ALL RESPONSES TO PHQ QUESTIONS 1-9: 0
1. LITTLE INTEREST OR PLEASURE IN DOING THINGS: NOT AT ALL
SUM OF ALL RESPONSES TO PHQ9 QUESTIONS 1 & 2: 0
SUM OF ALL RESPONSES TO PHQ QUESTIONS 1-9: 0
2. FEELING DOWN, DEPRESSED OR HOPELESS: NOT AT ALL

## 2025-02-02 ASSESSMENT — LIFESTYLE VARIABLES
HOW MANY STANDARD DRINKS CONTAINING ALCOHOL DO YOU HAVE ON A TYPICAL DAY: 1
HOW OFTEN DO YOU HAVE SIX OR MORE DRINKS ON ONE OCCASION: 1
HOW MANY STANDARD DRINKS CONTAINING ALCOHOL DO YOU HAVE ON A TYPICAL DAY: 1 OR 2
HOW OFTEN DO YOU HAVE A DRINK CONTAINING ALCOHOL: 2
HOW OFTEN DO YOU HAVE A DRINK CONTAINING ALCOHOL: MONTHLY OR LESS

## 2025-02-05 ENCOUNTER — OFFICE VISIT (OUTPATIENT)
Dept: PRIMARY CARE CLINIC | Age: 75
End: 2025-02-05
Payer: MEDICARE

## 2025-02-05 VITALS
OXYGEN SATURATION: 97 % | HEIGHT: 66 IN | RESPIRATION RATE: 12 BRPM | SYSTOLIC BLOOD PRESSURE: 130 MMHG | HEART RATE: 82 BPM | WEIGHT: 200 LBS | TEMPERATURE: 97.6 F | BODY MASS INDEX: 32.14 KG/M2 | DIASTOLIC BLOOD PRESSURE: 84 MMHG

## 2025-02-05 DIAGNOSIS — E11.8 TYPE 2 DIABETES MELLITUS WITH COMPLICATION, WITHOUT LONG-TERM CURRENT USE OF INSULIN (HCC): ICD-10-CM

## 2025-02-05 DIAGNOSIS — D50.9 IRON DEFICIENCY ANEMIA, UNSPECIFIED IRON DEFICIENCY ANEMIA TYPE: ICD-10-CM

## 2025-02-05 DIAGNOSIS — E55.9 VITAMIN D DEFICIENCY: ICD-10-CM

## 2025-02-05 DIAGNOSIS — I10 ESSENTIAL HYPERTENSION: ICD-10-CM

## 2025-02-05 DIAGNOSIS — Z00.00 MEDICARE ANNUAL WELLNESS VISIT, SUBSEQUENT: Primary | ICD-10-CM

## 2025-02-05 DIAGNOSIS — E78.2 MIXED HYPERLIPIDEMIA: ICD-10-CM

## 2025-02-05 DIAGNOSIS — K21.9 GASTROESOPHAGEAL REFLUX DISEASE, UNSPECIFIED WHETHER ESOPHAGITIS PRESENT: ICD-10-CM

## 2025-02-05 DIAGNOSIS — E11.42 DIABETIC POLYNEUROPATHY ASSOCIATED WITH TYPE 2 DIABETES MELLITUS (HCC): ICD-10-CM

## 2025-02-05 LAB — HBA1C MFR BLD: 6.4 %

## 2025-02-05 PROCEDURE — G0439 PPPS, SUBSEQ VISIT: HCPCS | Performed by: INTERNAL MEDICINE

## 2025-02-05 PROCEDURE — 3075F SYST BP GE 130 - 139MM HG: CPT | Performed by: INTERNAL MEDICINE

## 2025-02-05 PROCEDURE — 1123F ACP DISCUSS/DSCN MKR DOCD: CPT | Performed by: INTERNAL MEDICINE

## 2025-02-05 PROCEDURE — 3017F COLORECTAL CA SCREEN DOC REV: CPT | Performed by: INTERNAL MEDICINE

## 2025-02-05 PROCEDURE — 83036 HEMOGLOBIN GLYCOSYLATED A1C: CPT | Performed by: INTERNAL MEDICINE

## 2025-02-05 PROCEDURE — 3079F DIAST BP 80-89 MM HG: CPT | Performed by: INTERNAL MEDICINE

## 2025-02-05 PROCEDURE — 3044F HG A1C LEVEL LT 7.0%: CPT | Performed by: INTERNAL MEDICINE

## 2025-02-05 NOTE — PROGRESS NOTES
Medicare Annual Wellness Visit    Ricardo Saez is here for Medicare AWV    Assessment & Plan   1. Medicare annual wellness visit, subsequent  Assessment & Plan:  -Medicare AWV done  2. Type 2 diabetes mellitus with complication, without long-term current use of insulin (HCC)  Assessment & Plan:  -Hemoglobin A1c of 6.4% shows diabetes is well-controlled  -Continue metformin 850 mg 2 times daily with meals  -Limit carbohydrates to 45 grams with meals and 15 grams with snacks  -monitor blood sugars  -goal for blood sugar fasting or pre-meal  is   -goal for blood sugar 2 hours after a meal is less than 180  -goal for blood sugar at bedtime is less than 150  -Regular aerobic exercise  Orders:  -     POCT glycosylated hemoglobin (Hb A1C)  -     Comprehensive Metabolic Panel; Future  3. Essential hypertension  Assessment & Plan:  -stable  -continue lisinopril 20 mg once daily  -low sodium diet  -regular aerobic exercise  Orders:  -     Comprehensive Metabolic Panel; Future  4. Mixed hyperlipidemia  Assessment & Plan:  -Stable  -LDL is at goal  -Continue lovastatin 40 mg nightly  -low cholesterol, low fat diet  -regular aerobic exercise  Orders:  -     Comprehensive Metabolic Panel; Future  -     Lipid Panel; Future  5. Gastroesophageal reflux disease, unspecified whether esophagitis present  Assessment & Plan:  -stable  -Continue omeprazole 40 mg once daily before breakfast  -Decrease caffeine, avoid spicy foods, avoid tomato based foods  -Eat small meals instead of large meals  -Wait 2-3 hours after eating before lying down   6. Diabetic polyneuropathy associated with type 2 diabetes mellitus (HCC)  Assessment & Plan:  -Stable  -Continue gabapentin 200 mg nightly  7. Iron deficiency anemia, unspecified iron deficiency anemia type  Assessment & Plan:  -Stable  -Continue ferrous sulfate 325 mg 2 times daily  Orders:  -     Iron and TIBC; Future  -     CBC; Future  8. Vitamin D deficiency  Assessment &

## 2025-02-05 NOTE — PATIENT INSTRUCTIONS

## 2025-02-06 LAB
25(OH)D3 SERPL-MCNC: 44.3 NG/ML
ALBUMIN SERPL-MCNC: 4.7 G/DL (ref 3.4–5)
ALBUMIN/GLOB SERPL: 1.2 {RATIO} (ref 1.1–2.2)
ALP SERPL-CCNC: 53 U/L (ref 40–129)
ALT SERPL-CCNC: 37 U/L (ref 10–40)
ANION GAP SERPL CALCULATED.3IONS-SCNC: 11 MMOL/L (ref 3–16)
AST SERPL-CCNC: 29 U/L (ref 15–37)
BILIRUB SERPL-MCNC: <0.2 MG/DL (ref 0–1)
BUN SERPL-MCNC: 10 MG/DL (ref 7–20)
CALCIUM SERPL-MCNC: 10.7 MG/DL (ref 8.3–10.6)
CHLORIDE SERPL-SCNC: 99 MMOL/L (ref 99–110)
CHOLEST SERPL-MCNC: 175 MG/DL (ref 0–199)
CO2 SERPL-SCNC: 27 MMOL/L (ref 21–32)
CREAT SERPL-MCNC: 0.6 MG/DL (ref 0.8–1.3)
DEPRECATED RDW RBC AUTO: 14.9 % (ref 12.4–15.4)
GFR SERPLBLD CREATININE-BSD FMLA CKD-EPI: >90 ML/MIN/{1.73_M2}
GLUCOSE SERPL-MCNC: 112 MG/DL (ref 70–99)
HCT VFR BLD AUTO: 37.8 % (ref 40.5–52.5)
HDLC SERPL-MCNC: 46 MG/DL (ref 40–60)
HGB BLD-MCNC: 12.5 G/DL (ref 13.5–17.5)
IRON SATN MFR SERPL: 19 % (ref 20–50)
IRON SERPL-MCNC: 68 UG/DL (ref 59–158)
LDLC SERPL CALC-MCNC: 79 MG/DL
MCH RBC QN AUTO: 28.6 PG (ref 26–34)
MCHC RBC AUTO-ENTMCNC: 33 G/DL (ref 31–36)
MCV RBC AUTO: 86.5 FL (ref 80–100)
PLATELET # BLD AUTO: 319 K/UL (ref 135–450)
PMV BLD AUTO: 8.9 FL (ref 5–10.5)
POTASSIUM SERPL-SCNC: 4.7 MMOL/L (ref 3.5–5.1)
PROT SERPL-MCNC: 8.6 G/DL (ref 6.4–8.2)
RBC # BLD AUTO: 4.37 M/UL (ref 4.2–5.9)
SODIUM SERPL-SCNC: 137 MMOL/L (ref 136–145)
TIBC SERPL-MCNC: 352 UG/DL (ref 260–445)
TRIGL SERPL-MCNC: 249 MG/DL (ref 0–150)
VLDLC SERPL CALC-MCNC: 50 MG/DL
WBC # BLD AUTO: 7.3 K/UL (ref 4–11)

## 2025-02-11 PROBLEM — Z00.00 MEDICARE ANNUAL WELLNESS VISIT, SUBSEQUENT: Status: ACTIVE | Noted: 2025-02-11

## 2025-02-12 NOTE — ASSESSMENT & PLAN NOTE
-Stable  -LDL is at goal  -Continue lovastatin 40 mg nightly  -low cholesterol, low fat diet  -regular aerobic exercise

## 2025-02-12 NOTE — ASSESSMENT & PLAN NOTE
-Hemoglobin A1c of 6.4% shows diabetes is well-controlled  -Continue metformin 850 mg 3 times daily with meals  -Limit carbohydrates to 45 grams with meals and 15 grams with snacks  -monitor blood sugars  -goal for blood sugar fasting or pre-meal  is   -goal for blood sugar 2 hours after a meal is less than 180  -goal for blood sugar at bedtime is less than 150  -Regular aerobic exercise

## 2025-02-12 NOTE — ASSESSMENT & PLAN NOTE
-stable  -Continue omeprazole 40 mg once daily before breakfast  -Decrease caffeine, avoid spicy foods, avoid tomato based foods  -Eat small meals instead of large meals  -Wait 2-3 hours after eating before lying down

## 2025-03-02 ENCOUNTER — PATIENT MESSAGE (OUTPATIENT)
Dept: PRIMARY CARE CLINIC | Age: 75
End: 2025-03-02

## 2025-03-02 DIAGNOSIS — E11.42 DIABETIC POLYNEUROPATHY ASSOCIATED WITH TYPE 2 DIABETES MELLITUS (HCC): ICD-10-CM

## 2025-03-03 NOTE — TELEPHONE ENCOUNTER
Medication:   Requested Prescriptions     Pending Prescriptions Disp Refills    gabapentin (NEURONTIN) 100 MG capsule 180 capsule 0     Sig: Take 2 capsules by mouth nightly for 90 days.     Last Filled:  12/08/24    Last appt: 2/5/2025   Next appt: 6/5/2025    Last OARRS:       12/8/2024    11:17 AM   RX Monitoring   Periodic Controlled Substance Monitoring No signs of potential drug abuse or diversion identified.

## 2025-03-04 RX ORDER — GABAPENTIN 100 MG/1
200 CAPSULE ORAL
Qty: 180 CAPSULE | Refills: 1 | Status: SHIPPED | OUTPATIENT
Start: 2025-03-04 | End: 2025-08-31

## 2025-03-13 PROBLEM — Z00.00 MEDICARE ANNUAL WELLNESS VISIT, SUBSEQUENT: Status: RESOLVED | Noted: 2025-02-11 | Resolved: 2025-03-13

## 2025-04-27 DIAGNOSIS — E11.42 DIABETIC POLYNEUROPATHY ASSOCIATED WITH TYPE 2 DIABETES MELLITUS (HCC): ICD-10-CM

## 2025-04-28 NOTE — TELEPHONE ENCOUNTER
Medication:   Requested Prescriptions     Pending Prescriptions Disp Refills    gabapentin (NEURONTIN) 100 MG capsule [Pharmacy Med Name: Gabapentin 100 MG Oral Capsule] 200 capsule 2     Sig: TAKE 2 CAPSULES BY MOUTH EVERY  NIGHT     Last Filled:  03/04/25    Last appt: 2/5/2025   Next appt: 6/5/2025    Last OARRS:       12/8/2024    11:17 AM   RX Monitoring   Periodic Controlled Substance Monitoring No signs of potential drug abuse or diversion identified.

## 2025-05-09 RX ORDER — GABAPENTIN 100 MG/1
CAPSULE ORAL
Qty: 200 CAPSULE | Refills: 2 | OUTPATIENT
Start: 2025-05-09

## 2025-05-09 NOTE — TELEPHONE ENCOUNTER
Controlled Substance Monitoring:    Acute and Chronic Pain Monitoring:   RX Monitoring Periodic Controlled Substance Monitoring   5/9/2025   8:30 AM No signs of potential drug abuse or diversion identified.

## 2025-06-03 ENCOUNTER — HOSPITAL ENCOUNTER (OUTPATIENT)
Dept: CT IMAGING | Age: 75
Discharge: HOME | End: 2025-06-03
Attending: INTERNAL MEDICINE
Payer: MEDICARE

## 2025-06-03 VITALS
DIASTOLIC BLOOD PRESSURE: 58 MMHG | WEIGHT: 198 LBS | BODY MASS INDEX: 31.82 KG/M2 | HEART RATE: 70 BPM | OXYGEN SATURATION: 95 % | RESPIRATION RATE: 18 BRPM | SYSTOLIC BLOOD PRESSURE: 118 MMHG | HEIGHT: 66 IN | TEMPERATURE: 97.4 F

## 2025-06-03 DIAGNOSIS — N18.2 CKD (CHRONIC KIDNEY DISEASE), STAGE II: ICD-10-CM

## 2025-06-03 LAB
APTT BLD: 29.1 SEC (ref 22.1–36.4)
GLUCOSE BLD-MCNC: 115 MG/DL (ref 70–99)
INR PPP: 0.95 (ref 0.85–1.15)
PERFORMED ON: ABNORMAL
PLATELET # BLD AUTO: 318 K/UL (ref 135–450)
PROTHROMBIN TIME: 12.9 SEC (ref 11.9–14.9)

## 2025-06-03 PROCEDURE — 85610 PROTHROMBIN TIME: CPT

## 2025-06-03 PROCEDURE — 85049 AUTOMATED PLATELET COUNT: CPT

## 2025-06-03 PROCEDURE — 85730 THROMBOPLASTIN TIME PARTIAL: CPT

## 2025-06-03 PROCEDURE — 99152 MOD SED SAME PHYS/QHP 5/>YRS: CPT

## 2025-06-03 PROCEDURE — 6360000002 HC RX W HCPCS: Performed by: RADIOLOGY

## 2025-06-03 PROCEDURE — 77012 CT SCAN FOR NEEDLE BIOPSY: CPT

## 2025-06-03 PROCEDURE — 36415 COLL VENOUS BLD VENIPUNCTURE: CPT

## 2025-06-03 RX ORDER — MIDAZOLAM HYDROCHLORIDE 1 MG/ML
INJECTION, SOLUTION INTRAMUSCULAR; INTRAVENOUS PRN
Status: COMPLETED | OUTPATIENT
Start: 2025-06-03 | End: 2025-06-03

## 2025-06-03 RX ORDER — FENTANYL CITRATE 50 UG/ML
INJECTION, SOLUTION INTRAMUSCULAR; INTRAVENOUS PRN
Status: COMPLETED | OUTPATIENT
Start: 2025-06-03 | End: 2025-06-03

## 2025-06-03 RX ORDER — OXYCODONE AND ACETAMINOPHEN 5; 325 MG/1; MG/1
1 TABLET ORAL EVERY 4 HOURS PRN
Refills: 0 | Status: DISCONTINUED | OUTPATIENT
Start: 2025-06-03 | End: 2025-06-04 | Stop reason: HOSPADM

## 2025-06-03 RX ORDER — LIDOCAINE HYDROCHLORIDE 10 MG/ML
INJECTION, SOLUTION EPIDURAL; INFILTRATION; INTRACAUDAL; PERINEURAL PRN
Status: COMPLETED | OUTPATIENT
Start: 2025-06-03 | End: 2025-06-03

## 2025-06-03 RX ADMIN — FENTANYL CITRATE 50 MCG: 50 INJECTION, SOLUTION INTRAMUSCULAR; INTRAVENOUS at 09:40

## 2025-06-03 RX ADMIN — MIDAZOLAM HYDROCHLORIDE 1 MG: 1 INJECTION, SOLUTION INTRAMUSCULAR; INTRAVENOUS at 09:39

## 2025-06-03 RX ADMIN — LIDOCAINE HYDROCHLORIDE 15 ML: 10 INJECTION, SOLUTION EPIDURAL; INFILTRATION; INTRACAUDAL; PERINEURAL at 09:41

## 2025-06-03 ASSESSMENT — PAIN SCALES - GENERAL: PAINLEVEL_OUTOF10: 0

## 2025-06-03 ASSESSMENT — PAIN - FUNCTIONAL ASSESSMENT
PAIN_FUNCTIONAL_ASSESSMENT: NONE - DENIES PAIN
PAIN_FUNCTIONAL_ASSESSMENT: WONG-BAKER FACES
PAIN_FUNCTIONAL_ASSESSMENT: NONE - DENIES PAIN

## 2025-06-03 NOTE — PROGRESS NOTES
Ambulatory Surgery/Procedure Discharge Note    Vitals:    06/03/25 1305   BP: (!) 118/58   Pulse: 70   Resp: 18   Temp: 97.4 °F (36.3 °C)   SpO2: 95%       No intake/output data recorded.    Restroom use offered before discharge.  Yes  Pt is toileted and remains safe. Incision site at the back, left side is CDI. Dischare instructions were read by Agueda REZA with the spouse.   Tolerates PO well and no nausea reported.     Pain assessment:  none  Pain Level: 0        Patient discharged to home/self care. Patient discharged via wheel chair by this nurse to waiting family/S.O.       6/3/2025 1:55 PM

## 2025-06-03 NOTE — PROGRESS NOTES
Instructions given to wife over the phone. Pat verbalized understanding of all instructions. Will be here at 1400 to  pt, requested d/t using walker she pull up. Da and pt notified.

## 2025-06-03 NOTE — PRE SEDATION
Patient:  Ricardo Saez   :   1950      Relevant clinical history, particularly as it involves the pending procedure, was reviewed and discussed.    The procedure including risks, benefits, and alternatives was discussed at length with the patient (or designated family member) and all questions were answered.  Informed consent to proceed with the procedure was given.    Vital signs were monitored and documented by the Radiology nurse.    Targeted physical examination  Heart : regular rate and rhythm  Lungs : clear, breathing easily  Condition : stable    [facility] nurses notes reviewed and agreed.    Past Medical History:        Diagnosis Date    Anemia 2013    Paredes's esophagus     Colon polyps     GERD (gastroesophageal reflux disease)     Hyperlipidemia 2014    Hypertension 2013    Iron deficiency anemia 10/08/2013    Microalbuminuria 2016    Obesity     Osteoarthritis 2014    Primary osteoarthritis of both knees 2016    Right inguinal hernia 2013    Type 2 diabetes mellitus without complication (HCC)     Vitamin D deficiency 2013       Past Surgical History:           Procedure Laterality Date    COLONOSCOPY  13    COLONOSCOPY  3/7/16    repeat in 5 years - Sadie DOWELL    HERNIA REPAIR Right     inguinal     OTHER SURGICAL HISTORY Left 2015    Left Buttock mass exesion      TONSILLECTOMY      UPPER GASTROINTESTINAL ENDOSCOPY  13    UPPER GASTROINTESTINAL ENDOSCOPY  3/7/16    repeat in 3 yrs - Sadie DOWELL    WISDOM TOOTH EXTRACTION         Allergies:  Patient has no known allergies.    Medications:   Home Meds  Current Outpatient Medications on File Prior to Encounter   Medication Sig Dispense Refill    gabapentin (NEURONTIN) 100 MG capsule Take 2 capsules by mouth nightly for 180 days. 180 capsule 1    lovastatin (MEVACOR) 40 MG tablet TAKE 1 TABLET EVERY NIGHT 90 tablet 0    lisinopril (PRINIVIL;ZESTRIL) 20 MG tablet TAKE 1 TABLET EVERY 
History of Anesthesia Complications:   none    ASA Classification:  Class 2 - A normal healthy patient with mild systemic disease    Sedation/ Anesthesia Plan:   intravenous sedation    Medications Planned:   midazolam (Versed) intravenously and fentanyl intravenously    Patient is an appropriate candidate for plan of sedation: yes    Electronically signed by Hood Avila MD on 6/3/2025 at 10:07 AM

## 2025-06-03 NOTE — PROGRESS NOTES
[facility] NURSING PROGRESS NOTE    Procedure:  Renal Biopsy  Arianna 3, 2025  Ricardo Saez      Allergies:  No Known Allergies    Vitals:    06/03/25 0831   BP: 132/69   Pulse: 75   Resp: 20   Temp: 98.5 °F (36.9 °C)   SpO2: 97%       Recent lab work reviewed with MD: yes     Procedure explained to patient by MD: yes   Informed consent obtained:yes  Family with patient: no    Mental Status:  Normal  Readiness to learn:  Yes  Barriers to learning: No    Pain Assessment Pre-Procedure:  Pain Present:  no  Pain Score:  0  Pain Quality/Description:  na    Time out Procedure Verification with:  [x] RN  [x] Physician  [x] Patient  [x] Other: CT Technologist  Procedure site marked, if applicable:  Yes    Note: Patient arrived A & O x 4, denies pain, breathing easily on room air, Spoke to Dr. Avila & Romy Ambriz NP prior to procedure.  Procedural sedation:  Fentanyl:  50mcg  Versed:    1mg  Post Procedureal Note:  Patient tolerated procedure well.  Breathing easily on room air.  Report given to [facility] RN.  Patient transported in stable conditon to room 4.    Pain Assessment Post-Procedure:  Pain Present:  no  Pain Score:  0  Pain Quality/Description:  na    Plan of Care Goals:  Safety measures met:  Yes  Patient understands explanation of procedure:  Yes    Time in:  0930  Time out:  1000  Adrianna Morfin RN.  R.N. 6/3/2025

## 2025-06-03 NOTE — BRIEF OP NOTE
Brief Postoperative Note    Ricardo Saez  YOB: 1950  [id number]    Pre-operative Diagnosis: Renal insufficiency    Post-operative Diagnosis: Same    Procedure: CT guided native renal biopsy    Anesthesia: IV sedation, local    Surgeons/Assistants: Hood Avila MD    Estimated Blood Loss: Minimal    Complications: none    Specimens: were obtained    Native renal biopsy right kidney with 18g system.  3 cores acquired.    Hood Avila MD MD  6/3/2025

## 2025-06-05 ENCOUNTER — RESULTS FOLLOW-UP (OUTPATIENT)
Dept: PRIMARY CARE CLINIC | Age: 75
End: 2025-06-05

## 2025-06-05 ENCOUNTER — OFFICE VISIT (OUTPATIENT)
Dept: PRIMARY CARE CLINIC | Age: 75
End: 2025-06-05

## 2025-06-05 VITALS
WEIGHT: 195 LBS | OXYGEN SATURATION: 99 % | BODY MASS INDEX: 31.34 KG/M2 | HEIGHT: 66 IN | DIASTOLIC BLOOD PRESSURE: 78 MMHG | TEMPERATURE: 97.6 F | HEART RATE: 90 BPM | SYSTOLIC BLOOD PRESSURE: 124 MMHG

## 2025-06-05 DIAGNOSIS — M25.561 PAIN IN BOTH KNEES, UNSPECIFIED CHRONICITY: ICD-10-CM

## 2025-06-05 DIAGNOSIS — D50.9 IRON DEFICIENCY ANEMIA, UNSPECIFIED IRON DEFICIENCY ANEMIA TYPE: ICD-10-CM

## 2025-06-05 DIAGNOSIS — E11.8 TYPE 2 DIABETES MELLITUS WITH COMPLICATION, WITHOUT LONG-TERM CURRENT USE OF INSULIN (HCC): ICD-10-CM

## 2025-06-05 DIAGNOSIS — E11.8 TYPE 2 DIABETES MELLITUS WITH COMPLICATION, WITHOUT LONG-TERM CURRENT USE OF INSULIN (HCC): Primary | ICD-10-CM

## 2025-06-05 DIAGNOSIS — E78.2 MIXED HYPERLIPIDEMIA: ICD-10-CM

## 2025-06-05 DIAGNOSIS — E11.42 DIABETIC POLYNEUROPATHY ASSOCIATED WITH TYPE 2 DIABETES MELLITUS (HCC): ICD-10-CM

## 2025-06-05 DIAGNOSIS — I10 ESSENTIAL HYPERTENSION: ICD-10-CM

## 2025-06-05 DIAGNOSIS — M25.562 PAIN IN BOTH KNEES, UNSPECIFIED CHRONICITY: ICD-10-CM

## 2025-06-05 LAB
ALBUMIN SERPL-MCNC: 4.4 G/DL (ref 3.4–5)
ALBUMIN/GLOB SERPL: 1.3 {RATIO} (ref 1.1–2.2)
ALP SERPL-CCNC: 48 U/L (ref 40–129)
ALT SERPL-CCNC: 28 U/L (ref 10–40)
ANION GAP SERPL CALCULATED.3IONS-SCNC: 11 MMOL/L (ref 3–16)
AST SERPL-CCNC: 22 U/L (ref 15–37)
BILIRUB SERPL-MCNC: 0.3 MG/DL (ref 0–1)
BUN SERPL-MCNC: 7 MG/DL (ref 7–20)
CALCIUM SERPL-MCNC: 9.8 MG/DL (ref 8.3–10.6)
CHLORIDE SERPL-SCNC: 101 MMOL/L (ref 99–110)
CHOLEST SERPL-MCNC: 175 MG/DL (ref 0–199)
CO2 SERPL-SCNC: 26 MMOL/L (ref 21–32)
CREAT SERPL-MCNC: 0.5 MG/DL (ref 0.8–1.3)
DEPRECATED RDW RBC AUTO: 14.4 % (ref 12.4–15.4)
GFR SERPLBLD CREATININE-BSD FMLA CKD-EPI: >90 ML/MIN/{1.73_M2}
GLUCOSE SERPL-MCNC: 128 MG/DL (ref 70–99)
HBA1C MFR BLD: 6.6 %
HCT VFR BLD AUTO: 34.9 % (ref 40.5–52.5)
HDLC SERPL-MCNC: 43 MG/DL (ref 40–60)
HGB BLD-MCNC: 11.7 G/DL (ref 13.5–17.5)
IRON SATN MFR SERPL: 25 % (ref 20–50)
IRON SERPL-MCNC: 76 UG/DL (ref 59–158)
LDLC SERPL CALC-MCNC: 72 MG/DL
MCH RBC QN AUTO: 28.4 PG (ref 26–34)
MCHC RBC AUTO-ENTMCNC: 33.5 G/DL (ref 31–36)
MCV RBC AUTO: 84.6 FL (ref 80–100)
PLATELET # BLD AUTO: 278 K/UL (ref 135–450)
PMV BLD AUTO: 8.5 FL (ref 5–10.5)
POTASSIUM SERPL-SCNC: 4.7 MMOL/L (ref 3.5–5.1)
PROT SERPL-MCNC: 7.7 G/DL (ref 6.4–8.2)
RBC # BLD AUTO: 4.12 M/UL (ref 4.2–5.9)
SODIUM SERPL-SCNC: 138 MMOL/L (ref 136–145)
TIBC SERPL-MCNC: 309 UG/DL (ref 260–445)
TRIGL SERPL-MCNC: 300 MG/DL (ref 0–150)
VLDLC SERPL CALC-MCNC: 60 MG/DL
WBC # BLD AUTO: 7 K/UL (ref 4–11)

## 2025-06-05 NOTE — PROGRESS NOTES
Date of Visit: 2025    Ricardo Saez (:  1950) is a 74 y.o. male,  Established patient here for evaluation of the following chief complaint(s):  Diabetes, Hypertension, Hyperlipidemia, Peripheral Neuropathy, and Knee Pain (Bilateral knee pain )      ASSESSMENT/PLAN:    Assessment & Plan  1. Type 2 diabetes mellitus with complication without long-term current use of insulin  - Hemoglobin A1c level is 6.6%, indicating controlled diabetes  - Continue Metformin 850 mg twice daily with meals   - limit carbohydrate intake to 45 g per meal and 15 g per snack  - Regular monitoring of blood glucose levels: fasting or premeal levels between 70 and 130, postprandial levels <180, and bedtime levels <150  - Regular aerobic exercise recommended  - comprehensive metabolic panel    2. Diabetic polyneuropathy associated with type 2 diabetes mellitus  - Condition is stable  - continue Gabapentin 200 mg nightly  - wears diabetic shoes    3. Essential hypertension  - Condition is stable  - continue Lisinopril 20 mg once daily  - Low sodium diet  - regular aerobic exercise recommended  - comprehensive metabolic panel    4. Mixed hyperlipidemia  - Condition is stable  - LDL at goal, but triglycerides are elevated  - Continue Lovastatin 40 mg nightly  - Low fat, low cholesterol diet  - regular aerobic exercise recommended  - lipid panel  - comprehensiveMetabolic panel    5. Bilateral knee pain  - X-rays of the right and left knees will be obtained  - Tylenol 650 mg three times daily  - continue diclofenac 1% gel 2 g topically twice daily will be continued  - Referral to orthopedics will be made    6. Iron deficiency anemia  - Condition is stable  - continue Ferrous sulfate 325 mg once daily with a meal   - Recent EGD and colonoscopy were performed   - CBC  - iron and TIBC          Return in about 4 months (around 10/5/2025) for diabetes, neuropathy, hypertension, hyperlipidemia, and iron deficiency

## 2025-06-16 DIAGNOSIS — E11.42 DIABETIC POLYNEUROPATHY ASSOCIATED WITH TYPE 2 DIABETES MELLITUS (HCC): ICD-10-CM

## 2025-06-16 NOTE — TELEPHONE ENCOUNTER
Medication:   Requested Prescriptions     Pending Prescriptions Disp Refills    gabapentin (NEURONTIN) 100 MG capsule 180 capsule 1     Sig: Take 2 capsules by mouth nightly for 180 days.     Last Filled:  3.4.25    Last appt: 6/5/2025   Next appt: 10/6/2025    Last OARRS:       5/9/2025     8:30 AM   RX Monitoring   Periodic Controlled Substance Monitoring No signs of potential drug abuse or diversion identified.

## 2025-06-21 RX ORDER — GABAPENTIN 100 MG/1
200 CAPSULE ORAL
Qty: 180 CAPSULE | Refills: 1 | Status: SHIPPED | OUTPATIENT
Start: 2025-06-21 | End: 2025-12-18

## 2025-06-28 DIAGNOSIS — E11.8 TYPE 2 DIABETES MELLITUS WITH COMPLICATION, WITHOUT LONG-TERM CURRENT USE OF INSULIN (HCC): ICD-10-CM

## 2025-06-30 NOTE — TELEPHONE ENCOUNTER
Medication:   Requested Prescriptions     Pending Prescriptions Disp Refills    metFORMIN (GLUCOPHAGE) 850 MG tablet 180 tablet 1     Sig: TAKE 1 TABLET TWICE DAILY WITH MEALS     Last Filled:  10.28.24    Last appt: 6/5/2025   Next appt: 10/6/2025    Last Labs DM:   Lab Results   Component Value Date/Time    LABA1C 6.6 06/05/2025 07:51 AM

## 2025-08-29 DIAGNOSIS — E11.42 DIABETIC POLYNEUROPATHY ASSOCIATED WITH TYPE 2 DIABETES MELLITUS (HCC): ICD-10-CM

## 2025-08-30 RX ORDER — GABAPENTIN 100 MG/1
200 CAPSULE ORAL DAILY
Qty: 180 CAPSULE | Refills: 0 | Status: SHIPPED | OUTPATIENT
Start: 2025-08-30 | End: 2025-11-28

## 2025-08-31 DIAGNOSIS — E11.8 TYPE 2 DIABETES MELLITUS WITH COMPLICATION, WITHOUT LONG-TERM CURRENT USE OF INSULIN (HCC): ICD-10-CM
